# Patient Record
Sex: MALE | Race: WHITE | NOT HISPANIC OR LATINO | Employment: UNEMPLOYED | URBAN - METROPOLITAN AREA
[De-identification: names, ages, dates, MRNs, and addresses within clinical notes are randomized per-mention and may not be internally consistent; named-entity substitution may affect disease eponyms.]

---

## 2017-03-19 ENCOUNTER — HOSPITAL ENCOUNTER (EMERGENCY)
Facility: HOSPITAL | Age: 57
Discharge: HOME | End: 2017-03-19
Attending: EMERGENCY MEDICINE | Admitting: EMERGENCY MEDICINE

## 2017-03-19 VITALS
SYSTOLIC BLOOD PRESSURE: 118 MMHG | TEMPERATURE: 97.6 F | OXYGEN SATURATION: 98 % | RESPIRATION RATE: 20 BRPM | HEIGHT: 68 IN | HEART RATE: 63 BPM | DIASTOLIC BLOOD PRESSURE: 73 MMHG | BODY MASS INDEX: 21.98 KG/M2 | WEIGHT: 145 LBS

## 2017-03-19 DIAGNOSIS — M54.32 SCIATICA OF LEFT SIDE: Primary | ICD-10-CM

## 2017-03-19 PROCEDURE — 63710000001 PREDNISONE PER 5 MG: Performed by: EMERGENCY MEDICINE

## 2017-03-19 PROCEDURE — 99283 EMERGENCY DEPT VISIT LOW MDM: CPT

## 2017-03-19 PROCEDURE — 99284 EMERGENCY DEPT VISIT MOD MDM: CPT | Performed by: EMERGENCY MEDICINE

## 2017-03-19 RX ORDER — IBUPROFEN 200 MG
800 TABLET ORAL EVERY 6 HOURS PRN
COMMUNITY
End: 2017-07-28 | Stop reason: HOSPADM

## 2017-03-19 RX ORDER — HYDROCODONE BITARTRATE AND ACETAMINOPHEN 5; 325 MG/1; MG/1
1 TABLET ORAL EVERY 4 HOURS PRN
Qty: 20 TABLET | Refills: 0 | Status: SHIPPED | OUTPATIENT
Start: 2017-03-19 | End: 2017-07-28 | Stop reason: HOSPADM

## 2017-03-19 RX ORDER — CYCLOBENZAPRINE HCL 10 MG
10 TABLET ORAL 3 TIMES DAILY PRN
Qty: 20 TABLET | Refills: 0 | Status: SHIPPED | OUTPATIENT
Start: 2017-03-19 | End: 2017-07-28 | Stop reason: HOSPADM

## 2017-03-19 RX ORDER — PREDNISONE 20 MG/1
60 TABLET ORAL ONCE
Status: COMPLETED | OUTPATIENT
Start: 2017-03-19 | End: 2017-03-19

## 2017-03-19 RX ORDER — METHYLPREDNISOLONE 4 MG/1
TABLET ORAL
Qty: 21 TABLET | Refills: 0 | Status: SHIPPED | OUTPATIENT
Start: 2017-03-19 | End: 2017-07-28 | Stop reason: HOSPADM

## 2017-03-19 RX ADMIN — PREDNISONE 60 MG: 20 TABLET ORAL at 17:08

## 2017-03-19 NOTE — ED NOTES
Requested records from [facility].  Spoke with Luz Elena - [phone]     Keshawn Hunter  03/19/17 9730

## 2017-03-19 NOTE — ED PROVIDER NOTES
"Subjective   History of Present Illness  History of Present Illness    Chief complaint: back pain    Location: Lower back    Quality/Severity:  Moderate, sharp    Timing/Onset/Duration: Intermittent over the last month, but worse today    Modifying Factors: It hurts to move, it feels better after he took half of the hydrocodone that was an old prescription of his    Associated Symptoms: Patient denies any headache.  No fever chills or cough.  No sore throat earache or nasal congestion.  No chest pain or shortness breath.  No abdominal pain.  No diarrhea or burning when he urinates.  His been no loss of control of bladder or bowel function.  No urinary retention.    Narrative: This 56-year-old white male with a history of L4-L5 lumbar fusion in the past presents with intermittent low back pain for the last month.  One month ago he squatted and \"experiencing lower back pain shooting down his left leg.  She has no metal in his back.  There is no fever or chills.  There is no numbness, tingling, weakness, or change in bladder or bowel function.  Patient had an x-ray done at [facility] on Tuesday that was unremarkable according to the patient.  Today he was laying on the Skin and the pain returned and he could not get up off the couch.  His ex-wife came and gave him half of a hydrocodone pill that was an old prescription of his and he got enough relief come to the emergency department.  The patient has not seen his primary care provider since this flare started.    PCP:  Jaz Devlin      Review of Systems   Constitutional: Negative for chills and fever.   HENT: Negative for ear pain and sore throat.    Respiratory: Negative for cough and shortness of breath.    Cardiovascular: Negative for chest pain and leg swelling.   Gastrointestinal: Negative for abdominal pain, nausea and vomiting.   Genitourinary: Negative for decreased urine volume, difficulty urinating, dysuria, flank pain, frequency, hematuria and " urgency.   Musculoskeletal: Negative for arthralgias, back pain, neck pain and neck stiffness.   Skin: Negative for rash.   Neurological: Negative for dizziness, speech difficulty, weakness, light-headedness, numbness and headaches.   Hematological: Negative for adenopathy.   Psychiatric/Behavioral: Negative.  Negative for agitation and confusion.        Medication List      ASK your doctor about these medications          ibuprofen 200 MG tablet   Commonly known as:  ADVIL,MOTRIN           Past Medical History   Diagnosis Date   • Injury of back        No Known Allergies    Past Surgical History   Procedure Laterality Date   • Back surgery     • Brain surgery     • Kidney stone surgery         History reviewed. No pertinent family history.    Social History     Social History   • Marital status:      Spouse name: N/A   • Number of children: N/A   • Years of education: N/A     Social History Main Topics   • Smoking status: Current Every Day Smoker     Packs/day: 1.00   • Smokeless tobacco: None   • Alcohol use No   • Drug use: Defer   • Sexual activity: Defer     Other Topics Concern   • None     Social History Narrative   • None           Objective   Physical Exam   Constitutional: He is oriented to person, place, and time. He appears well-developed and well-nourished. No distress.   ED Triage Vitals:  Temp: 97.6 °F (36.4 °C) (03/19/17 1623)  Heart Rate: 63 (03/19/17 1623)  Resp: 20 (03/19/17 1623)  BP: 118/73 (03/19/17 1623)  SpO2: 98 % (03/19/17 1623)  Temp src: Oral (03/19/17 1623)  Heart Rate Source: n/a  Patient Position: Sitting (03/19/17 1623)  BP Location: Right arm (03/19/17 1623)  FiO2 (%): n/a    The patient's vitals were reviewed by me.  Unless otherwise noted they are within normal limits.     HENT:   Head: Normocephalic and atraumatic.   Right Ear: External ear normal.   Left Ear: External ear normal.   Nose: Nose normal.   Mouth/Throat: Oropharynx is clear and moist.   Eyes: Conjunctivae and  EOM are normal. Pupils are equal, round, and reactive to light. Right eye exhibits no discharge. Left eye exhibits no discharge. No scleral icterus.   Neck: Normal range of motion. Neck supple. No JVD present. No tracheal deviation present. No thyromegaly present.   Cardiovascular: Normal rate, regular rhythm, normal heart sounds and intact distal pulses.  Exam reveals no gallop and no friction rub.    No murmur heard.  Pulmonary/Chest: Effort normal and breath sounds normal. No stridor. No respiratory distress. He has no wheezes. He has no rales. He exhibits no tenderness.   Abdominal: Soft. Bowel sounds are normal. He exhibits no distension and no mass. There is no tenderness. There is no rebound and no guarding. No hernia.   Musculoskeletal: Normal range of motion. He exhibits no edema or deformity.   There is mild lower tenderness and spasm.  The patient has artery had half the hydrocodone just prior to arrival.     Lymphadenopathy:     He has no cervical adenopathy.   Neurological: He is alert and oriented to person, place, and time.   Skin: Skin is dry. No rash noted. He is not diaphoretic. No erythema. No pallor.   Psychiatric: His behavior is normal.   Nursing note and vitals reviewed.      Procedures         ED Course  ED Course      4:54 PM, 03/19/17:  The [facility] report was reviewed by me.  The report number is [id number].    5:11 PM, 03/19/17:  The lumbar spine x-ray report from [facility] which was performed on 13 March 2017 shows no acute findings.  There is a lumbar sacral junction level fusion noted at L4 level disc and facet degeneration.  This was read by Dr. Tommy Tovar MD.    7:18 PM, 03/19/17:  The process.  He has no new complaints.  He does feel better.  His vital signs were reviewed and are stable.  Neurological exam: Conscious alert oriented ×4 with no focal deficits noted.    7:19 PM, 03/19/17:  Patient's diagnosis of sciatica was discussed with him.  The plan will be to prescribe  the patient a Medrol Dosepak and Flexeril.  The patient is to follow-up with Dr. Jaz Devlin in 1 week.  He's been instructed to return if he has increasing pain, numbness, tingling, weakness, change in bladder or bowel function, fever, chills, worse in any way at all.  All the patient's questions were answered patient will be discharged in good condition.          MDM    Final diagnoses:   None         ED Medications:  Medications - No data to display    New Medications:     Medication List      ASK your doctor about these medications          ibuprofen 200 MG tablet   Commonly known as:  ADVIL,MOTRIN           Stopped Medications:     Medication List      ASK your doctor about these medications          ibuprofen 200 MG tablet   Commonly known as:  ADVIL,MOTRIN             Final diagnoses:   Sciatica of left side            Don Maddox MD  03/19/17 1923

## 2017-03-19 NOTE — DISCHARGE INSTRUCTIONS
Follow-up with Jaz mcleod in 1 week.  Return to emergency department if you have increasing pain, numbness, tingling, weakness, change in bladder or bowel function, worse in any way at all.

## 2017-06-20 ENCOUNTER — HOSPITAL ENCOUNTER (INPATIENT)
Facility: HOSPITAL | Age: 57
LOS: 1 days | Discharge: HOME | End: 2017-06-21
Attending: PHYSICAL MEDICINE & REHABILITATION | Admitting: PHYSICAL MEDICINE & REHABILITATION

## 2017-06-20 RX ORDER — CLOPIDOGREL BISULFATE 75 MG/1
75 TABLET ORAL DAILY
Status: DISCONTINUED | OUTPATIENT
Start: 2017-06-20 | End: 2017-06-21 | Stop reason: HOSPADM

## 2017-06-20 RX ORDER — ASPIRIN 325 MG
325 TABLET ORAL DAILY
Status: DISCONTINUED | OUTPATIENT
Start: 2017-06-20 | End: 2017-06-21 | Stop reason: HOSPADM

## 2017-06-20 RX ORDER — ONDANSETRON 4 MG/1
4 TABLET, FILM COATED ORAL EVERY 8 HOURS PRN
Status: DISCONTINUED | OUTPATIENT
Start: 2017-06-20 | End: 2017-06-21 | Stop reason: HOSPADM

## 2017-06-20 RX ORDER — HYDROCODONE BITARTRATE AND ACETAMINOPHEN 5; 325 MG/1; MG/1
1 TABLET ORAL EVERY 6 HOURS PRN
Status: DISCONTINUED | OUTPATIENT
Start: 2017-06-20 | End: 2017-06-21 | Stop reason: HOSPADM

## 2017-06-20 NOTE — PLAN OF CARE
Problem: Patient Care Overview (Adult)  Goal: Plan of Care Review  Outcome: Ongoing (interventions implemented as appropriate)    06/20/17 1846   Coping/Psychosocial Response Interventions   Plan Of Care Reviewed With patient   Patient Care Overview   Progress no change   Outcome Evaluation   Outcome Summary/Follow up Plan Admitted to rehab from [facility]. Pt. oriented to person only.       Goal: Adult Individualization and Mutuality  Outcome: Ongoing (interventions implemented as appropriate)    Problem: Fall Risk (Adult)  Goal: Identify Related Risk Factors and Signs and Symptoms  Outcome: Ongoing (interventions implemented as appropriate)  Goal: Absence of Falls  Outcome: Ongoing (interventions implemented as appropriate)    Problem: Nutrition, Enteral (Adult)  Goal: Signs and Symptoms of Listed Potential Problems Will be Absent or Manageable (Nutrition, Enteral)  Outcome: Ongoing (interventions implemented as appropriate)    Problem: Stroke (Hemorrhagic) (Adult)  Goal: Signs and Symptoms of Listed Potential Problems Will be Absent or Manageable (Stroke)  Outcome: Ongoing (interventions implemented as appropriate)

## 2017-06-21 ENCOUNTER — APPOINTMENT (OUTPATIENT)
Dept: CT IMAGING | Facility: HOSPITAL | Age: 57
End: 2017-06-21
Attending: HOSPITALIST

## 2017-06-21 ENCOUNTER — APPOINTMENT (OUTPATIENT)
Dept: NEUROLOGY | Facility: HOSPITAL | Age: 57
End: 2017-06-21
Attending: RADIOLOGY

## 2017-06-21 ENCOUNTER — APPOINTMENT (OUTPATIENT)
Dept: CT IMAGING | Facility: HOSPITAL | Age: 57
End: 2017-06-21
Attending: PHYSICAL MEDICINE & REHABILITATION

## 2017-06-21 ENCOUNTER — HOSPITAL ENCOUNTER (INPATIENT)
Facility: HOSPITAL | Age: 57
LOS: 5 days | End: 2017-06-26
Attending: HOSPITALIST | Admitting: HOSPITALIST

## 2017-06-21 VITALS
WEIGHT: 117 LBS | OXYGEN SATURATION: 98 % | SYSTOLIC BLOOD PRESSURE: 131 MMHG | DIASTOLIC BLOOD PRESSURE: 91 MMHG | RESPIRATION RATE: 16 BRPM | HEIGHT: 70 IN | TEMPERATURE: 98.1 F | BODY MASS INDEX: 16.75 KG/M2 | HEART RATE: 68 BPM

## 2017-06-21 DIAGNOSIS — R26.89 DECREASED MOBILITY: Primary | ICD-10-CM

## 2017-06-21 PROBLEM — I60.9 SUBARACHNOID HEMORRHAGE: Status: ACTIVE | Noted: 2017-06-21

## 2017-06-21 PROBLEM — R56.9 SEIZURE: Status: ACTIVE | Noted: 2017-06-21

## 2017-06-21 PROBLEM — R40.0 SOMNOLENCE: Status: ACTIVE | Noted: 2017-06-21

## 2017-06-21 PROBLEM — E44.0 PROTEIN-CALORIE MALNUTRITION, MODERATE: Status: ACTIVE | Noted: 2017-06-21

## 2017-06-21 PROBLEM — R13.12 OROPHARYNGEAL DYSPHAGIA: Status: ACTIVE | Noted: 2017-06-21

## 2017-06-21 PROBLEM — I60.9 SUBARACHNOID BLEED: Status: ACTIVE | Noted: 2017-06-21

## 2017-06-21 LAB
ALBUMIN SERPL-MCNC: 3.5 G/DL (ref 3.5–5.2)
ALBUMIN/GLOB SERPL: 1.2 G/DL
ALP SERPL-CCNC: 69 U/L (ref 39–117)
ALT SERPL W P-5'-P-CCNC: 32 U/L (ref 1–41)
ANION GAP SERPL CALCULATED.3IONS-SCNC: 9.6 MMOL/L
AST SERPL-CCNC: 24 U/L (ref 1–40)
BASOPHILS # BLD AUTO: 0.01 10*3/MM3 (ref 0–0.2)
BASOPHILS NFR BLD AUTO: 0.1 % (ref 0–1.5)
BILIRUB SERPL-MCNC: 0.5 MG/DL (ref 0.1–1.2)
BUN BLD-MCNC: 20 MG/DL (ref 6–20)
BUN/CREAT SERPL: 29.4 (ref 7–25)
CALCIUM SPEC-SCNC: 9.3 MG/DL (ref 8.6–10.5)
CHLORIDE SERPL-SCNC: 105 MMOL/L (ref 98–107)
CO2 SERPL-SCNC: 28.4 MMOL/L (ref 22–29)
CREAT BLD-MCNC: 0.68 MG/DL (ref 0.76–1.27)
DEPRECATED RDW RBC AUTO: 50.9 FL (ref 37–54)
EOSINOPHIL # BLD AUTO: 0.13 10*3/MM3 (ref 0–0.7)
EOSINOPHIL NFR BLD AUTO: 1.7 % (ref 0.3–6.2)
ERYTHROCYTE [DISTWIDTH] IN BLOOD BY AUTOMATED COUNT: 14.1 % (ref 11.5–14.5)
GFR SERPL CREATININE-BSD FRML MDRD: 120 ML/MIN/1.73
GLOBULIN UR ELPH-MCNC: 3 GM/DL
GLUCOSE BLD-MCNC: 128 MG/DL (ref 65–99)
GLUCOSE BLDC GLUCOMTR-MCNC: 125 MG/DL (ref 70–130)
HCT VFR BLD AUTO: 36.4 % (ref 40.4–52.2)
HGB BLD-MCNC: 12.3 G/DL (ref 13.7–17.6)
IMM GRANULOCYTES # BLD: 0.03 10*3/MM3 (ref 0–0.03)
IMM GRANULOCYTES NFR BLD: 0.4 % (ref 0–0.5)
LYMPHOCYTES # BLD AUTO: 0.9 10*3/MM3 (ref 0.9–4.8)
LYMPHOCYTES NFR BLD AUTO: 11.8 % (ref 19.6–45.3)
MCH RBC QN AUTO: 33.1 PG (ref 27–32.7)
MCHC RBC AUTO-ENTMCNC: 33.8 G/DL (ref 32.6–36.4)
MCV RBC AUTO: 97.8 FL (ref 79.8–96.2)
MONOCYTES # BLD AUTO: 0.53 10*3/MM3 (ref 0.2–1.2)
MONOCYTES NFR BLD AUTO: 7 % (ref 5–12)
NEUTROPHILS # BLD AUTO: 6.01 10*3/MM3 (ref 1.9–8.1)
NEUTROPHILS NFR BLD AUTO: 79 % (ref 42.7–76)
NRBC BLD MANUAL-RTO: 0 /100 WBC (ref 0–0)
PLATELET # BLD AUTO: 357 10*3/MM3 (ref 140–500)
PMV BLD AUTO: 10 FL (ref 6–12)
POTASSIUM BLD-SCNC: 3.6 MMOL/L (ref 3.5–5.2)
PROT SERPL-MCNC: 6.5 G/DL (ref 6–8.5)
RBC # BLD AUTO: 3.72 10*6/MM3 (ref 4.6–6)
SODIUM BLD-SCNC: 143 MMOL/L (ref 136–145)
WBC NRBC COR # BLD: 7.61 10*3/MM3 (ref 4.5–10.7)

## 2017-06-21 PROCEDURE — 85025 COMPLETE CBC W/AUTO DIFF WBC: CPT | Performed by: PHYSICAL MEDICINE & REHABILITATION

## 2017-06-21 PROCEDURE — 82962 GLUCOSE BLOOD TEST: CPT

## 2017-06-21 PROCEDURE — 51702 INSERT TEMP BLADDER CATH: CPT

## 2017-06-21 PROCEDURE — 99255 IP/OBS CONSLTJ NEW/EST HI 80: CPT | Performed by: RADIOLOGY

## 2017-06-21 PROCEDURE — 95816 EEG AWAKE AND DROWSY: CPT

## 2017-06-21 PROCEDURE — 70450 CT HEAD/BRAIN W/O DYE: CPT

## 2017-06-21 PROCEDURE — 95819 EEG AWAKE AND ASLEEP: CPT | Performed by: PSYCHIATRY & NEUROLOGY

## 2017-06-21 PROCEDURE — 80053 COMPREHEN METABOLIC PANEL: CPT | Performed by: PHYSICAL MEDICINE & REHABILITATION

## 2017-06-21 RX ORDER — ASPIRIN 325 MG
325 TABLET ORAL DAILY
Status: DISCONTINUED | OUTPATIENT
Start: 2017-06-22 | End: 2017-06-26 | Stop reason: HOSPADM

## 2017-06-21 RX ORDER — HYDROCODONE BITARTRATE AND ACETAMINOPHEN 5; 325 MG/1; MG/1
1 TABLET ORAL EVERY 6 HOURS PRN
Status: DISCONTINUED | OUTPATIENT
Start: 2017-06-21 | End: 2017-06-21

## 2017-06-21 RX ORDER — CLOPIDOGREL BISULFATE 75 MG/1
75 TABLET ORAL DAILY
Status: DISCONTINUED | OUTPATIENT
Start: 2017-06-22 | End: 2017-06-26 | Stop reason: HOSPADM

## 2017-06-21 RX ORDER — ONDANSETRON 2 MG/ML
4 INJECTION INTRAMUSCULAR; INTRAVENOUS EVERY 6 HOURS PRN
Status: DISCONTINUED | OUTPATIENT
Start: 2017-06-21 | End: 2017-06-26 | Stop reason: HOSPADM

## 2017-06-21 RX ORDER — CLOPIDOGREL BISULFATE 75 MG/1
75 TABLET ORAL DAILY
Status: CANCELLED | OUTPATIENT
Start: 2017-06-22

## 2017-06-21 RX ORDER — PHENYTOIN 125 MG/5ML
100 SUSPENSION ORAL 3 TIMES DAILY
Status: DISCONTINUED | OUTPATIENT
Start: 2017-06-21 | End: 2017-06-23

## 2017-06-21 RX ORDER — ONDANSETRON 4 MG/1
4 TABLET, FILM COATED ORAL EVERY 8 HOURS PRN
Status: CANCELLED | OUTPATIENT
Start: 2017-06-21

## 2017-06-21 RX ORDER — ONDANSETRON 4 MG/1
4 TABLET, FILM COATED ORAL EVERY 8 HOURS PRN
Status: DISCONTINUED | OUTPATIENT
Start: 2017-06-21 | End: 2017-06-21

## 2017-06-21 RX ORDER — CLOPIDOGREL BISULFATE 75 MG/1
75 TABLET ORAL DAILY
Status: DISCONTINUED | OUTPATIENT
Start: 2017-06-22 | End: 2017-06-21

## 2017-06-21 RX ORDER — ASPIRIN 325 MG
325 TABLET ORAL DAILY
Status: DISCONTINUED | OUTPATIENT
Start: 2017-06-22 | End: 2017-06-21

## 2017-06-21 RX ORDER — SODIUM CHLORIDE 0.9 % (FLUSH) 0.9 %
1-10 SYRINGE (ML) INJECTION AS NEEDED
Status: DISCONTINUED | OUTPATIENT
Start: 2017-06-21 | End: 2017-06-26 | Stop reason: HOSPADM

## 2017-06-21 RX ORDER — HYDROCODONE BITARTRATE AND ACETAMINOPHEN 5; 325 MG/1; MG/1
1 TABLET ORAL EVERY 6 HOURS PRN
Status: DISCONTINUED | OUTPATIENT
Start: 2017-06-21 | End: 2017-06-26 | Stop reason: HOSPADM

## 2017-06-21 RX ORDER — ASPIRIN 325 MG
325 TABLET ORAL DAILY
Status: CANCELLED | OUTPATIENT
Start: 2017-06-22

## 2017-06-21 RX ORDER — DEXTROSE AND SODIUM CHLORIDE 5; .45 G/100ML; G/100ML
50 INJECTION, SOLUTION INTRAVENOUS CONTINUOUS
Status: DISCONTINUED | OUTPATIENT
Start: 2017-06-21 | End: 2017-06-22

## 2017-06-21 RX ORDER — HYDROCODONE BITARTRATE AND ACETAMINOPHEN 5; 325 MG/1; MG/1
1 TABLET ORAL EVERY 6 HOURS PRN
Status: CANCELLED | OUTPATIENT
Start: 2017-06-21 | End: 2017-06-30

## 2017-06-21 RX ADMIN — DEXTROSE AND SODIUM CHLORIDE 50 ML/HR: 5; .45 INJECTION, SOLUTION INTRAVENOUS at 15:49

## 2017-06-21 RX ADMIN — PHENYTOIN 100 MG: 125 SUSPENSION ORAL at 22:09

## 2017-06-21 RX ADMIN — PHENYTOIN 100 MG: 125 SUSPENSION ORAL at 17:17

## 2017-06-21 NOTE — H&P
Patient Care Team:  Jaz Devlin DO as PCP - General (Family Medicine)    Chief complaint   1.  status post subarachnoid hemorrhage/intraventricular hemorrhage. Alba Mohamud grade 4  2.  Recurrent basilar aneurysm  3.  External ventricular drain  4.  June 4 cerebral angiogram with stent-assisted embolization - With the stent placement he should remain on aspirin 325 mg and Plavix 75 mg for 3 months.  After 3 months, he can return to aspirin 81 mg daily.  5.  Randi 15 ventricular peritoneal shunt Certas Plus Valve - setting of 4.   6.  June 17-new bleed right parietal lobe small amount at the entrance site of the ventricular peritoneal shunt catheter into the brain parenchyma  7.  June 19 PEG tube placement  8.  Failure to thrive.  9.  Urinary retention-coudé catheter removed on June 20 prior to transfer-on intermittent straight catheter regimen  10.  DVT prophylaxis-SCDs    Subjective     History of Present Illness  57-year-old male transferred from [facility] last evening.  He was admitted there on Randi 3.  History of basilar artery aneurysm rupture previously clipped greater than 20 years ago per the chart.  He was found down by his daughter at home.  Severely altered mental status and was unable to stay awake during questioning.  CT had revealed diffuse intraventricular hemorrhage and subsequent hydrocephalus with artifact from a clip reconstruction in the area of basal artery.  Upon arrival at [facility] CTA of the head was done and concerning for a basilar artery aneurysm recurrence.    He was taken to the operating room for placement of external ventricular drain for hydrocephalus and angiogram with stent-assisted embolization of a recurrent basis or tip aneurysm.  He was also found to have a non-ruptured right MCA aneurysm.  His hospital course was complicated by hydrocephalus, altered mental status, and failure to thrive requiring feeding tube placement.  The patient felt  ventricular drain clamping and required placement of ventriculoperitoneal shunt.    He remained very confused but was able to am to answer simple questions and follow simple commands.      On June 17 he had a change in his neurologic status and stat CT of the head showed a new bleed in the right parietal lobe,small amount at the entrance site of the ventricular peritoneal shunt catheter into the brain parenchyma   .      With the stent placement he should remain on aspirin 325 mg and Plavix 75 mg for 3 months.  After 3 months, he can return to aspirin 81 mg daily.      Other issues included urinary retention with the placement of a daily catheter at [facility] and then replaced on Randi 10 at [facility] when he was unable to void.  The Shanks catheter was removed prior to his transfer from [facility] last evening.    When the patient arrived last evening he was oriented to person only.  Around 4 AM he could did indicate to staff that he did not want any suctioning.  This morning, however, he is obtunded.  He will open his eyes to painful stimuli and grimace to have a stick and make an utterance but will not interact with the examiner.  Will not follow any commands.  It was not possible to assess any volitional movement given his obtunded state.  Rapid response has been called and the patient's being transferred to CT for stat CAT scan.  His imaging from [place] is available here on a disc.    Functionally, on June 20 bed mobility moderate assist to.  Sitting contact guard on edge of bed for balance.  Difficulty aligning chronic and head.  On June 19 upper body dressing minimum-moderate assist.  Lower body dressing max assist.  Poor balance.  On purée diet with thin liquids.  Videofluoroscopic swallow study on June 7 showed mild to moderate oral stage dysphagia.  Also supplemental tube feeds.  Severe cognitive linguistic deficits, mild to moderate expressive deficits, mild receptive  deficits on June 17.  Shanks catheter was removed on June 20 with intermittent straight catheter regimen.  Review of Systems   Not able to obtain.   Past Medical History:   Diagnosis Date   • Injury of back    • Kidney stones    • Stroke      Past Surgical History:   Procedure Laterality Date   • BACK SURGERY     • BRAIN SURGERY     • KIDNEY STONE SURGERY     •  SHUNT INSERTION     Basilar artery aneurysm clipping in the remote past.  PEG tube placement on June 19.  Cerebral angiogram gram and stent-assisted embolization of basilar artery aneurysm June 4, 2017.  Ventriculoperitoneal shunt placement Randi 15.  History reviewed. No pertinent family history.  Social History   Substance Use Topics   • Smoking status: Current Every Day Smoker     Packs/day: 2.00   • Smokeless tobacco: None   • Alcohol use No   Lives independently in a house with 2 steps to enter.  Was working 8-10 hours daily.   since 1991 but his ex-wife still talks with him daily.  He recently obtained for patient guardianship.  Plan is to go to the home of his ex-wife until his daughter returns to [place].  Occasional tobacco.  Marijuana use.  Smoked 2 packs per day.  Prescriptions Prior to Admission   Medication Sig Dispense Refill Last Dose   • cyclobenzaprine (FLEXERIL) 10 MG tablet Take 1 tablet by mouth 3 (Three) Times a Day As Needed for Muscle Spasms. 20 tablet 0    • HYDROcodone-acetaminophen (NORCO) 5-325 MG per tablet Take 1 tablet by mouth Every 4 (Four) Hours As Needed for Moderate Pain (4-6). 20 tablet 0    • ibuprofen (ADVIL,MOTRIN) 200 MG tablet Take 800 mg by mouth Every 6 (Six) Hours As Needed for Mild Pain (1-3).   3/18/2017 at Unknown time   • MethylPREDNISolone (MEDROL) 4 MG tablet follow package directions 21 tablet 0      Allergies:  Review of patient's allergies indicates no known allergies.    aspirin 325 mg Oral Daily   clopidogrel 75 mg Oral Daily   HYDROcodone-acetaminophen  •  ondansetron      Current  Facility-Administered Medications:   •  aspirin tablet 325 mg, 325 mg, Oral, Daily, Callum Baker MD  •  clopidogrel (PLAVIX) tablet 75 mg, 75 mg, Oral, Daily, Callum Baker MD  •  HYDROcodone-acetaminophen (NORCO) 5-325 MG per tablet 1 tablet, 1 tablet, Oral, Q6H PRN, Callum Baker MD  •  ondansetron (ZOFRAN) tablet 4 mg, 4 mg, Oral, Q8H PRN, Callum Baker MD    Objective      Vital Signs  Temp:  [97.5 °F (36.4 °C)-98.5 °F (36.9 °C)] 98.1 °F (36.7 °C)  Heart Rate:  [66-98] 76  Resp:  [14-16] 16  BP: (125-147)/(83-92) 125/83    Physical Exam  Mental status-obtunded will not do his eyes except painful stimuli.  Does not follow any commands.  We will make an utterance when IV was placed  Lungs-clear to auscultation anteriorly  Heart-regular rate and rhythm  Abdomen-PEG tube site unremarkable.  Positive bowel sounds.  Soft.  Extremities-no edema  Neurologic-handed.  Does not open his eyes except to painful stimuli.  Does not follow any commands.  Does make an utterance when an IV was placed.  Does not do any volitional movement.  Did have discomfort with passive range of motion of his legs.  Results Review:    On June 16 sodium 140, potassium 3.9, chloride 109, carbon dioxide 24, dealing 20, creatinine 0.6, magnesium 2.3, calcium 8.3.  On Rnadi 3 AST 51, a LT 49, alkaline phosphatase 53.  On June 4 triglycerides 55, cholesterol 132, HDL 46, LDL 75.  On June 1630 BC 9.2 hemoglobin 0.5 hematocrit 34.8, platelets 320.   I reviewed the patient's new clinical results.      Assessment/Plan     Active Problems:    Subarachnoid bleed      Assessment & Plan  1.  status post subarachnoid hemorrhage/intraventricular hemorrhage. Alba Mohamud grade 4  2.  Recurrent basilar aneurysm  3.  External ventricular drain  4.  June 4 cerebral angiogram with stent-assisted embolization - With the stent placement he should remain on aspirin 325 mg and Plavix 75 mg for 3 months.  After 3 months, he can return to  aspirin 81 mg daily.  5.  Randi 15 ventricular peritoneal shunt Certas Plus Valve - setting of 4.   6.  June 17-new bleed right parietal lobe small amount at the entrance site of the ventricular peritoneal shunt catheter into the brain parenchyma  7.  June 19 PEG tube placement  8.  Failure to thrive.  9.  Urinary retention-coudé catheter removed on June 20 prior to transfer-on intermittent straight catheter regimen  10.  DVT prophylaxis-SCDs    Decline in neurologic status on the a.m. of June 21.  Obtunded.  Stat CT of the brain ordered, stroke neurology notified to evaluate.  Patient being transferred to the acute care wing of the hospital.  Neurology consult   for evaluation.  Given his neurologic change, he is not presently appropriate for transfer back to [facility] at this time.  The differential would include recurrent bleed versus hydrocephalus versus seizure.  We'll check CBC and CMP for any metabolic change.  I discussed the patients findings and my recommendations with nursing staff    Callum Baker MD  06/21/17  8:27 AM    Time:

## 2017-06-21 NOTE — CONSULTS
"Adult Nutrition  Assessment/PES    Patient Name:  Rafita Davis  YOB: 1960  MRN: [id number]  Admit Date:  6/21/2017    Assessment Date:  6/21/2017     Consult received- ordered TF-Jevity 1.2 goal rate @ 60 cc/hr, fluid flushes 15 cc q hour; RD to monitor and follow         Reason for Assessment       06/21/17 1356    Reason for Assessment    Reason For Assessment/Visit nurse/nurse practitioner consult;identified at risk by screening criteria;TF/PN    Diagnosis --   bleeding in brain                Anthropometrics       06/21/17 1357    Anthropometrics (Special Considerations)    Height Used for Calculations 1.778 m (5' 10\")    Weight Used for Calculations 53.1 kg (117 lb)    RD Calculated IBW 73    RD Calculated % IBW 72    RD Calculated BMI (kg/m2) 16.7    Body Mass Index (BMI)    BMI Grade 16 - 16.9 low grade II            Labs/Tests/Procedures/Meds       06/21/17 1358    Labs/Tests/Procedures/Meds    Diagnostic Test/Procedure Review reviewed    Labs/Tests Review Reviewed;Glucose    Medication Review Reviewed, pertinent   aspirin, D5% with NaCl    Significant Vitals reviewed            Physical Findings       06/21/17 1358    Physical Findings/Assessment    Additional Documentation Physical Appearance (Group)   B=15    Physical Appearance    Overall Physical Appearance underweight    Tubes peg tube            Estimated/Assessed Needs       06/21/17 1358    Calculation Measurements    Weight Used For Calculations 53.1 kg (117 lb)    Height Used for Calculations 1.778 m (5' 10\")    Estimated/Assessed Energy Needs    Energy Need Method Kcal/kg    kcal/kg 30    30 Kcal/Kg (kcal) 1592.13    Estimated/Assessed Protein Needs    Weight Used for Protein Calculation 53.1 kg (117 lb)    Protein (gm/kg) 1.0    1.0 Gm Protein (gm) 53.07    Estimated/Assessed Fluid Needs    Fluid Need Method RDA method    RDA Method (mL)  1600            Nutrition Prescription Ordered       06/21/17 1359    Nutrition " Prescription PO    Current PO Diet NPO                Problem/Interventions:        Problem 1       06/21/17 1359    Nutrition Diagnoses Problem 1    Problem 1 Needs Alternate Route    Etiology (related to) MNT for Treatment/Condition    Signs/Symptoms (evidenced by) NPO                    Intervention Goal       06/21/17 1359    Intervention Goal    General Maintain nutrition;Nutrition support treatment    TF/PN Inititiate TF/PN;Tolerate TF at goal    Weight Appropriate weight gain            Nutrition Intervention       06/21/17 1359    Nutrition Intervention    RD/Tech Action Follow Tx progress;Care plan reviewd            Nutrition Prescription       06/21/17 1359    Nutrition Prescription EN    Enteral Prescription Enteral begin/change    Enteral Route PEG    Product Jevity 1.2 cheyenne    TF Delivery Method Continuous    Continuous TF Goal Rate (mL/hr) 60 mL/hr    Water flush (mL)  15 mL    Water Flush Frequency Per hour    New EN Prescription Ordered? Yes            Education/Evaluation       06/21/17 3119    Education    Education Will Instruct as appropriate    Monitor/Evaluation    Monitor Per protocol    Education Follow-up Reinforce PRN          Electronically signed by:  Tara Milian RD  06/21/17 2:00 PM

## 2017-06-21 NOTE — PROGRESS NOTES
Inpatient Rehabilitation Plan of Care Note    Plan of Care  Care Plan Reviewed - No updates at this time.    Safety    Performed Intervention(s)  Safety Rounds      Psychosocial    Performed Intervention(s)  Encourage to verbalize concerns and needs.    Signed by: Benjamin Horowitz RN

## 2017-06-21 NOTE — NURSING NOTE
Team D. Norm. Almost obtunded    0815. Team D here. To transfer to [address] [place] [address].  Called Nisreen Davis his ex wife.  Full code status for now.  Explained different code levels.  She is an hour away and is on way.  He takes no home meds.  She knows to go to [facility].  I will call report.

## 2017-06-21 NOTE — PROGRESS NOTES
Inpatient Rehabilitation Plan of Care Note    Plan of Care  Care Plan Reviewed - No updates at this time.    Psychosocial    [RN] Behavior(Active)  Current Status(06/19/2017): Pt. is confused. Lacks insight with current  situation.  Weekly Goal(06/26/2017): Pt. will be able to express concerns.  Discharge Goal: Same as weekly        Safety    [RN] Potential for Injury(Active)  Current Status(06/19/2017): Hx. of fall  Weekly Goal(06/26/2017): No falls  Discharge Goal: No falls        Sphincter Control    [RN] Bladder Management(Active)  Current Status(06/19/2017): Shanks removed 6/20 prior to admisison. Patient  incontinent.  Weekly Goal(06/26/2017): Continent 100%  Discharge Goal: Continent 100%    [RN] Bowel Management(Active)  Current Status(06/19/2017): Incontinent  Weekly Goal(06/26/2017): Continent 100%  Discharge Goal: Continent 100%    Signed by: Colten Roblero RN

## 2017-06-21 NOTE — PLAN OF CARE
Problem: Patient Care Overview (Adult)  Goal: Plan of Care Review  Outcome: Unable to achieve outcome(s) by discharge Date Met:  06/21/17 06/21/17 0952   Coping/Psychosocial Response Interventions   Plan Of Care Reviewed With patient   Patient Care Overview   Progress declining   Outcome Evaluation   Outcome Summary/Follow up Plan Tx after team d to 5 s with mental status change         Problem: Fall Risk (Adult)  Goal: Absence of Falls  Outcome: Ongoing (interventions implemented as appropriate)    06/21/17 0952   Fall Risk (Adult)   Absence of Falls unable to achieve outcome

## 2017-06-21 NOTE — PROGRESS NOTES
Malnutrition Severity Assessment    Patient Name:  Rafita Davis  YOB: 1960  MRN: [id number]  Admit Date:  6/21/2017    Patient meets criteria for : Moderate malnutrition        Malnutrition Type: Chronic Illness Malnutrition     Malnutrition Type (last 8 hours)      Malnutrition Severity Assessment       06/21/17 1401    Malnutrition Severity Assessment    Malnutrition Type Chronic Illness Malnutrition          Weight Status         Most Recent Value    BMI  Mod (<17)    %IBW  Mod <80% [72% IBW]      Energy Intake Status         Most Recent Value    Energy Intake  Mod (<75% / > or equal to 1 mo)              Electronically signed by:  Tara Milian RD  06/21/17 2:01 PM

## 2017-06-21 NOTE — PROGRESS NOTES
SECTION GG    Eating Performance Discharge: [place] does all of the effort. Patient does none  of the effort to complete the activity. Or, the assistance of 2 or more helpers  is required for the patient to complete the activity.    Signed by: Benjamin Horowitz RN

## 2017-06-21 NOTE — PROGRESS NOTES
Inpatient Rehabilitation Functional Measures Assessment    Functional Measures  MATILDE Eating:  [facility] Grooming: [facility] Bathing:  [facility] Upper Body Dressing:  [facility] Lower Body Dressing:  [facility] Toileting:  [facility] Bladder Management  Level of Assistance:  [place]  Frequency/Number of Accidents this Shift:  [facility] Bowel Management  Level of Assistance: [place]  Frequency/Number of Accidents this Shift: [facility] Bed/Chair/Wheelchair Transfer:  [facility] Toilet Transfer:  [facility] Tub/Shower Transfer:  [place]    Previously Documented Mode of Locomotion at Discharge: Field  MATILDE Expected Mode of Locomotion at Discharge: [facility] Walk/Wheelchair:  [facility] Stairs:  [facility] Comprehension:  Auditory comprehension is the usual mode. Patient does not  comprehend complex/abstract information in their primary language without  assistance from a helper. Comprehension Score = 2, Maximal Prompting. Patient  comprehends basic daily needs 25-49% of the time. Patient understands simple  information via single words or gestures. Requires maximal/a lot of prompting  (most of the time). No assistive devices were required.  MATILDE Expression:  Vocal expression is the usual mode. Patient does not express  complex/abstract information in their primary language without a helper.  Expression Score = 2, Maximal Prompting. Patient expresses basic daily needs  25-49% of the time. Patient uses only single words or gestures and requires  maximal/a lot of prompting (most of the time). No assistive devices were  required.  MATILDE Social Interaction:  Social Interaction Score = 3, Moderate Direction.  Patient interacts appropriately 50-74% of the time.  Patient requires  moderate/some direction for the following behavior(s):  MATILDE Problem Solving:  Patient does not make appropriate decisions in order to  solve complex problems without assistance from a helper. Problem Solving Score =  3, Moderate  Direction. Patient makes appropriate decisions in order to solve  routine problems 50-74% of the time. Patient requires moderate/some direction  for the following behavior(s):  MATILDE Memory:  Memory Score = 3, Moderate Prompting. Patient recognizes and  remembers 50-74% of the time. Patient requires moderate/some prompting  for  memory for the following:    Therapy Mode Minutes  Occupational Therapy: Branch  Physical Therapy: Branch  Speech Language Pathology:  Branch    Signed by: Colten Roblero RN

## 2017-06-21 NOTE — CODE DOCUMENTATION
"Mews score of 3. Pt is admitted to rehab from [facility] last night (6/20). Most information about patient was obtained via chart review. Pt was described as more alert during night shift. Day shift nurse states pt is more difficult to arouse. With constant loud stimulation, pt is able to open eyes and say \"shit\" or \"oh shit\". No other commands or responses from patient. Pt is unable to cooperate with NIH exam. Discussed with Dr Michael Hoover and Dr Baker. All agree on CT scan. New IV placed. Labs drawn. Pt transported to ct department. After scan completed, Dr gore and Dr Michael Hoover discussed. Admitting patient to tele floor for further evaluation. Pt taken to 5sout and report given. Pt is able to wake up now with less stimuli. Able to maintain wakefulness for longer than prior exam. Pt was able to hold himself over in the bed, squeeze my hand with the right side and follow other simple commands. Not much verbalization was observed. Pt states \"yeah\", \"ok\", \"alright now\" and other simple phrases. Pt remains lethargic. Ex- wife was notified of patient transfer and is in route to hospital. New orders placed for eeg and TCD.   "

## 2017-06-21 NOTE — CONSULTS
DOS: 2017  NAME: Rafita Davis   : 1960  PCP: Jaz Devlin DO  CC: Unresponsive     Referring MD: Marleny Batista, *     I reviewed the records from [place]    Neurological Problem and Interval History:  57 y.o. RHW male with a Hx of smoking and cerebral aneurysm clipping 20y ago.  The patient was admitted on Randi 3 to 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  He had not been heard from for 2 days by his family and was found having convulsions and biting his tongue. Subsequently treated with stent assisted coiling.  He developed hydrocephalus and needed a  shunt.  This was complicated by hemorrhage along the tract.  He did develop vasospasm documented by CT angiography and he is reported to have a 5mm right MCA aneurysm.  The family states that he has been progressively declining for the past 2 weeks and has not been eating.  He just had a PEG placed 2-3 days ago and he has lost a great deal of weight.  They denies any stroke or TIA symptoms and they denies any seizure activity in the hospital.  He was last at his baseline at 4:30 AM when the morning nurse went in to evaluate him he was very sleepy difficult to arouse and not answering questions is briskly.  She thought he might of had some left-sided weakness as well.  A rapid response was called and I ordered a stat CT scan of the brain.  Since then he has slowly improved and per his ex-wife who is medical decision maker he is back to the baseline.  This consists of significant memory trouble and confusion and episodes of going in and out.  He was smoking 2 packs per day.    Past Medical/Surgical Hx:  Past Medical History:   Diagnosis Date   • Injury of back    • Kidney stones    • Stroke      Past Surgical History:   Procedure Laterality Date   • BACK SURGERY     • BRAIN SURGERY     • KIDNEY STONE SURGERY     •  SHUNT INSERTION         Review of Systems:        A complete review of  all systems is negative except as described above.    Medications On Admission  Prescriptions Prior to Admission   Medication Sig Dispense Refill Last Dose   • cyclobenzaprine (FLEXERIL) 10 MG tablet Take 1 tablet by mouth 3 (Three) Times a Day As Needed for Muscle Spasms. 20 tablet 0    • HYDROcodone-acetaminophen (NORCO) 5-325 MG per tablet Take 1 tablet by mouth Every 4 (Four) Hours As Needed for Moderate Pain (4-6). 20 tablet 0    • ibuprofen (ADVIL,MOTRIN) 200 MG tablet Take 800 mg by mouth Every 6 (Six) Hours As Needed for Mild Pain (1-3).   3/18/2017 at Unknown time   • MethylPREDNISolone (MEDROL) 4 MG tablet follow package directions 21 tablet 0        Allergies:  No Known Allergies    Social Hx:  Social History     Social History   • Marital status:      Spouse name: N/A   • Number of children: N/A   • Years of education: N/A     Occupational History   • Not on file.     Social History Main Topics   • Smoking status: Current Every Day Smoker     Packs/day: 2.00   • Smokeless tobacco: Not on file   • Alcohol use No   • Drug use: Yes     Special: Marijuana   • Sexual activity: Defer     Other Topics Concern   • Not on file     Social History Narrative       Family Hx:  No family history on file.    Review of Imaging (Interpretation of images not reports):  CT brain: There is evidence of a right occipital EVD catheter with hemorrhage around the track, there is significant beam hardening artifact from a coil mass in the region of the basilar apex as well as a stent in the basilar artery,    Laboratory Results:   Lab Results   Component Value Date    GLUCOSE 128 (H) 06/21/2017    CALCIUM 9.3 06/21/2017     06/21/2017    K 3.6 06/21/2017    CO2 28.4 06/21/2017     06/21/2017    BUN 20 06/21/2017    CREATININE 0.68 (L) 06/21/2017    EGFRIFNONA 120 06/21/2017    BCR 29.4 (H) 06/21/2017    ANIONGAP 9.6 06/21/2017     Lab Results   Component Value Date    WBC 7.61 06/21/2017    HGB 12.3 (L)  06/21/2017    HCT 36.4 (L) 06/21/2017    MCV 97.8 (H) 06/21/2017     06/21/2017   P2Y12- 204 (6/15/17), ARU- 414    Physical Examination:  There were no vitals taken for this visit.  General Appearance:   Well developed, Thin bordering on cachectic, well groomed, alert, and cooperative.  HEENT: Post EVD and  shunt scars and hardware.  Normal fundoscopic exam including normal retina, discs are flat with sharp margins, normal vasculature.  Neck and Spine: Normal range of motion.  Normal alignment. No mass or tenderness. No bruits.  Cardiac: Regular rate and rhythm. No murmurs.  Peripheral Vasculature: Radial and pedal pulses are equal 1+ and symmetric. No signs of distal embolization.  Extremities:    No edema or deformities. Normal joint ROM. DEE hoses and SCD's in place  Skin:    No rashes or birth marks.    Neurological examination:  Higher Integrative  Function: Sleepy but arouses with frequent stimulation, he is very inattentive.  Oriented to self.  He was able to name his ex-wife but not his brother.  He couldn't register 3 out of 3 objects but recall 0 out of 3 at 5 minutes.  Language is normal.  There is no obvious neglect.  Higher integrative function is poor.  Fund of knowledge is poor.  CN II: Pupils are slightly unequal with the right slightly larger, round, and reactive to light. Normal visual fields to confrontation.    CN III IV VI: Extraocular movements are full without nystagmus.   CN V: Normal facial sensation and strength of muscles of mastication.  CN VII: Facial movements are symmetric. No weakness.  CN VIII:   Auditory acuity is normal.  CN IX & X:   Symmetric palatal movement.  CN XI: Sternocleidomastoid and trapezius are normal.  No weakness.  CN XII:   The tongue is midline.  No atrophy or fasciculations.  Motor: Probably normal muscle strength although he gave poor effort, decreased bulk and normal tone in upper and lower extremities.  No fasciculations, rigidity, spasticity, or  abnormal movements.  Reflexes: 2+ in the upper and lower extremities. Plantar responses are flexor on the right and extensor on the left.  Sensation: Normal to light touch in arms and legs, he was not attentive enough to test other modalities reliably.  Station and Gait: Unable to get up or walk.    Coordination: Finger to nose test shows no dysmetria.  Rapid alternating movements are normal.  Heel to shin he would not perform    Diagnoses / Discussion:  57 y.o. who presents with Sx of decreased responsiveness now slowly improving in the setting of subarachnoid hemorrhage, vasospasm and hydrocephalus.  He is back to his baseline which consists of severe amnesia cognitive deficits which is entirely consistent with a recent subarachnoid hemorrhage.  Given that he presented with a seizure I suspect he had a seizure this morning and he was postictal.  Therefore an EEG is appropriate.  I do not see a need for urgent vascular imaging since given the artifact from metal the only way to make sure everything is open would be with a cerebral angiogram.    Plan:  CT brain stat (done)  EEG stat  Aspirin 325mg  Plavix 75mg  Start antiepileptic- avoid Keppra because of its sedating and cognitive side effects- phenytoin 300 mg QD or Vimpat 100 mg twice a day  Hydrate  Neurochecks  Non-pharmacological DVT prophylaxis  EKG Tele  PT/OT/ST  Stroke Education  Blood pressure control to <130/80  Goal LDL <70-recommend high dose statins-   Serum glucose < 140     Call 911 for stroke any stroke symptoms    I have discussed the above with the Dr Harding, patient and family.  Time spent with patient: 60min    MDM  Reviewed: previous chart and vitals  Interpretation: CT scan and labs      Dictated via dragon dictation

## 2017-06-21 NOTE — PLAN OF CARE
Problem: Nutrition, Enteral (Adult)  Goal: Signs and Symptoms of Listed Potential Problems Will be Absent or Manageable (Nutrition, Enteral)  Outcome: Ongoing (interventions implemented as appropriate)    Problem: Stroke (Hemorrhagic) (Adult)  Goal: Signs and Symptoms of Listed Potential Problems Will be Absent or Manageable (Stroke)  Outcome: Ongoing (interventions implemented as appropriate)    Problem: Fall Risk (Adult)  Goal: Identify Related Risk Factors and Signs and Symptoms  Outcome: Outcome(s) achieved Date Met:  06/21/17  Goal: Absence of Falls  Outcome: Ongoing (interventions implemented as appropriate)    Problem: Urine Elimination, Impaired (Adult)  Goal: Identify Related Risk Factors and Signs and Symptoms  Outcome: Outcome(s) achieved Date Met:  06/21/17  Goal: Effective Urinary Elimination  Outcome: Ongoing (interventions implemented as appropriate)  Goal: Effective Containment of Urine  Outcome: Ongoing (interventions implemented as appropriate)  Goal: Reduced Incontinence Episodes  Outcome: Ongoing (interventions implemented as appropriate)

## 2017-06-21 NOTE — PLAN OF CARE
Problem: Patient Care Overview (Adult)  Goal: Plan of Care Review  Outcome: Ongoing (interventions implemented as appropriate)    06/20/17 2150   Coping/Psychosocial Response Interventions   Plan Of Care Reviewed With patient   Patient Care Overview   Progress no change   Outcome Evaluation   Outcome Summary/Follow up Plan Pt. new admit earlier this afternoon. Confused; oriented to self only. No complaints. tolerating TF.        Goal: Adult Individualization and Mutuality  Outcome: Ongoing (interventions implemented as appropriate)  Goal: Discharge Needs Assessment  Outcome: Ongoing (interventions implemented as appropriate)    Problem: Fall Risk (Adult)  Goal: Identify Related Risk Factors and Signs and Symptoms  Outcome: Outcome(s) achieved Date Met:  06/20/17  Goal: Absence of Falls  Outcome: Ongoing (interventions implemented as appropriate)    Problem: Nutrition, Enteral (Adult)  Goal: Signs and Symptoms of Listed Potential Problems Will be Absent or Manageable (Nutrition, Enteral)  Outcome: Ongoing (interventions implemented as appropriate)    Problem: Stroke (Hemorrhagic) (Adult)  Goal: Signs and Symptoms of Listed Potential Problems Will be Absent or Manageable (Stroke)  Outcome: Ongoing (interventions implemented as appropriate)

## 2017-06-21 NOTE — PROGRESS NOTES
Inpatient Rehabilitation Functional Measures Assessment    Functional Measures  MATILDE Eating:  [facility] Grooming: [facility] Bathing:  [facility] Upper Body Dressing:  [facility] Lower Body Dressing:  [facility] Toileting:  [facility] Bladder Management  Level of Assistance:  [place]  Frequency/Number of Accidents this Shift:  [facility] Bowel Management  Level of Assistance: [place]  Frequency/Number of Accidents this Shift: [facility] Bed/Chair/Wheelchair Transfer:  [facility] Toilet Transfer:  [facility] Tub/Shower Transfer:  [place]    Previously Documented Mode of Locomotion at Discharge: Field  MATILDE Expected Mode of Locomotion at Discharge: [facility] Walk/Wheelchair:  [facility] Stairs:  [facility] Comprehension:  Auditory comprehension is the usual mode. Patient does not  comprehend complex/abstract information in their primary language without  assistance from a helper. Comprehension Score = 1, Total Assistance.  Patient  understands basic daily needs less than 25% of the time and/or does not  understand simple information such as single words or gestures. Patient requires  the following assistive device(s): obtunded .  MATLIDE Expression:  Vocal expression is the usual mode. Patient does not express  complex/abstract information in their primary language without a helper.  Expression Score = 1, Total Assistance. Patient expresses basic daily needs less  than 25% of the time, or does not express basic needs appropriately or  consistently despite prompting. Patient requires the following assistive  device(s): obtunded .  MATILDE Social Interaction:  Social Interaction Score = 1, Total Assistance. Patient  interacts appropriately less than 25% of the time.  Patient requires total  assistance (directing all or almost all of the time) for the following  behavior(s): obtunded .  MATILDE Problem Solving:  Patient does not make appropriate decisions in order to  solve complex problems without assistance from  a helper. Problem Solving Score =  1, Total Assistance. Patient makes appropriate decisions in order to solve  routine problems less than 25% of the time. Patient requires total direction for  the following behavior(s): obtunded .  MATILDE Memory:  Memory Score = 1, Total Assistance. Patient recognizes and  remembers less than 25% of the time. Patient requires total assistance  (prompting all or almost all of the time) for memory for the following: obtunded  . obtunded .    Therapy Mode Minutes  Occupational Therapy: Branch  Physical Therapy: Branch  Speech Language Pathology:  Branch    Signed by: Benjamin Horowitz RN

## 2017-06-21 NOTE — H&P
[facility]    Patient Identification:  Name: Rafita Davis  Age/Sex: 57 y.o. male  :  1960  MRN: [id number]         Primary Care Physician: Jaz Devlin DO    No chief complaint on file.  decreased responsiveness      History of Present Illness  Patient is a 57 y.o. male who presents with an episode of unresponsiveness this a.m. while in acute rehabilitation.  The patient was transferred from [facility] yesterday after an intervention for a subarachnoid and intraventricular hemorrhage with placement of a  shunt with a recurrent bleed in the right parietal lobe near the  shunt.  The patient is minimally responsive and not able to provide any history.  The family is at bedside and providing some history.  While in the hospital he was not able to eat adequately and had a PEG tube placed for failure to thrive.  They indicate that he lost approximately 35 pounds while in the hospital.  He had urinary retention post catheter removal on 17. They were unaware of whether he had any urinary tract infections while there.  The patient had previous history of kidney stones but no known BPH or recurrent urinary tract infections.  He had a cerebral aneurysm treated about 20 years ago without any residual symptoms.  Since these events have all occurred he's opened his eyes occasionally and talked to family but has not had any meaningful conversation.  There is chronic tobacco use of about a pack a day for numerous years without any documented COPD, asthma, recurrent bronchitis.  They're unaware of any history of cardiac or gastrointestinal disease.  Prior to the neurological event he was not on any medications.    Past Medical History:   Diagnosis Date   • Injury of back    • Kidney stones    • Stroke      Past Surgical History:   Procedure Laterality Date   • BACK SURGERY     • BRAIN SURGERY     • KIDNEY STONE SURGERY     •  SHUNT INSERTION        No family history on file.  Social History   Substance Use Topics   • Smoking status: Current Every Day Smoker     Packs/day: 2.00   • Smokeless tobacco: Not on file   • Alcohol use No     Prescriptions Prior to Admission   Medication Sig Dispense Refill Last Dose   • cyclobenzaprine (FLEXERIL) 10 MG tablet Take 1 tablet by mouth 3 (Three) Times a Day As Needed for Muscle Spasms. 20 tablet 0    • HYDROcodone-acetaminophen (NORCO) 5-325 MG per tablet Take 1 tablet by mouth Every 4 (Four) Hours As Needed for Moderate Pain (4-6). 20 tablet 0    • ibuprofen (ADVIL,MOTRIN) 200 MG tablet Take 800 mg by mouth Every 6 (Six) Hours As Needed for Mild Pain (1-3).   3/18/2017 at Unknown time   • MethylPREDNISolone (MEDROL) 4 MG tablet follow package directions 21 tablet 0      Allergies:  Review of patient's allergies indicates no known allergies.    Review Of Systems:  Unobtainable      Vital Signs  Temp:  [97.5 °F (36.4 °C)-98.5 °F (36.9 °C)] 98.1 °F (36.7 °C)  Heart Rate:  [66-98] 68  Resp:  [14-16] 16  BP: (125-147)/(83-92) 131/91  There is no height or weight on file to calculate BMI.  GENERAL: The patient is a  white male who appears his stated age.   HEENT: PERRLA, pupils are 4 mm in size, patient will not open his mouth for me to look at oral mucosa   NECK: Thyroid is not enlarged, no lymphadenopathy or sternocleidomastoid hypertrophy  CHEST: Normal shape and expansion, no retractions or tenderness  LUNGS: Clear, decreased breath sounds bilaterally  HEART:Regular rate and rhythm, no murmur  ABDOMEN: Soft, nondistended, and mild tenderness in the right upper quadrant where there is a surgical incision according to family.  I was unable to remove the abdominal binder currently   EXTREMITIES: No edema or cyanosis, some spontaneous movement of his legs and arms with obvious muscle wasting  NEUROLOGIC: He responded to his name and stated a few words but not clear.  He did follow commands for me to assess muscle strength  which appears to be normal both in upper and lower extremities, no clonus, plantar response bilaterally is equivocal   RECTAL/SCROTAL: Not performed        .  Results Review:    I reviewed the patient's new clinical results.      Results from last 7 days  Lab Units 06/21/17  0830   WBC 10*3/mm3 7.61   HEMOGLOBIN g/dL 12.3*   PLATELETS 10*3/mm3 357       Results from last 7 days  Lab Units 06/21/17  0830   SODIUM mmol/L 143   POTASSIUM mmol/L 3.6   CHLORIDE mmol/L 105   TOTAL CO2 mmol/L 28.4   BUN mg/dL 20   CREATININE mg/dL 0.68*   CALCIUM mg/dL 9.3   GLUCOSE mg/dL 128*         Hemoglobin A1C:No results found for: HGBA1C    Glucose Range:  Glucose   Date/Time Value Ref Range Status   06/21/2017 0806 125 70 - 130 mg/dL Final       Assessment/Plan     Principal Problem:    Seizure  Active Problems:    Subarachnoid hemorrhage    Somnolence    Oropharyngeal dysphagia    Protein-calorie malnutrition, moderate      Assessment & Plan  Seizure: This morning's somnolence is likely related to seizure.  CT scan of brain did not show any evidence of repeat bleeding.  Vimpat to be started.    Oral pharyngeal dysphagia: Continue with PEG tube with Jevity starting at 40 cc an hour and increasing up to 60.  Dietary to see, IV fluids today  Speech therapy to evaluate for any oral intake    PT and OT to evaluate: Will eventually go back to rehabilitation likely in the next 24-48 hours to pain upon status    Protein calorie malnutrition: Extra protein to be added to the current nutrition    Anemia: Unspecified with high MCV check B12 folate and iron studies and vitamin D    Discussed CODE STATUS with his family with plans for him to be a full code currently his current status would not be acceptable to him or them long-term    I discussed the patients findings and my recommendations with patient.          Marleny Batista MD  [facility]  06/21/17  2:27 PM

## 2017-06-21 NOTE — SIGNIFICANT NOTE
06/21/17 1328   Rehab Treatment   Discipline speech language pathologist   Rehab Evaluation   Evaluation Not Performed unable to evaluate, medical status change  (Pt obtunded this AM, improving this PM per RN. Pt was on puree and thins at NH, but also with PEG. After discussing patient care with RN, SLP and RN agree bedside swallow is more appropriate next date d/t patient's cognitive status. RN to contact SLP if current status changes.)   Recommendations   SLP - Next Appointment (PRN)

## 2017-06-21 NOTE — PLAN OF CARE
Problem: Patient Care Overview (Adult)  Goal: Plan of Care Review  Outcome: Ongoing (interventions implemented as appropriate)    06/21/17 6472   Coping/Psychosocial Response Interventions   Plan Of Care Reviewed With patient;family   Patient Care Overview   Progress improving   Outcome Evaluation   Outcome Summary/Follow up Plan Pt transferred from [facility] rehab today r/t lethargy and disoirentedx4, EEG performed, neuro consulted. Pt in seizure precautions, ex wife and brother at , ex wife POA see paperwork in chart. Pt now alert to self, scored 11 on NIH, started on dilantin per neuro, yanes placed, tolerating IVF, refused oral care, will monitor.        Goal: Discharge Needs Assessment  Outcome: Ongoing (interventions implemented as appropriate)

## 2017-06-22 LAB
25(OH)D3 SERPL-MCNC: 23.7 NG/ML (ref 30–100)
ANION GAP SERPL CALCULATED.3IONS-SCNC: 10 MMOL/L
BUN BLD-MCNC: 21 MG/DL (ref 6–20)
BUN/CREAT SERPL: 32.8 (ref 7–25)
CALCIUM SPEC-SCNC: 9.4 MG/DL (ref 8.6–10.5)
CHLORIDE SERPL-SCNC: 103 MMOL/L (ref 98–107)
CO2 SERPL-SCNC: 28 MMOL/L (ref 22–29)
CREAT BLD-MCNC: 0.64 MG/DL (ref 0.76–1.27)
DEPRECATED RDW RBC AUTO: 52.1 FL (ref 37–54)
ERYTHROCYTE [DISTWIDTH] IN BLOOD BY AUTOMATED COUNT: 14.3 % (ref 11.5–14.5)
FOLATE SERPL-MCNC: 8.25 NG/ML (ref 4.78–24.2)
GFR SERPL CREATININE-BSD FRML MDRD: 128 ML/MIN/1.73
GLUCOSE BLD-MCNC: 116 MG/DL (ref 65–99)
HCT VFR BLD AUTO: 36.7 % (ref 40.4–52.2)
HGB BLD-MCNC: 12.2 G/DL (ref 13.7–17.6)
IRON 24H UR-MRATE: 40 MCG/DL (ref 59–158)
IRON SATN MFR SERPL: 21 % (ref 20–50)
MCH RBC QN AUTO: 33.2 PG (ref 27–32.7)
MCHC RBC AUTO-ENTMCNC: 33.2 G/DL (ref 32.6–36.4)
MCV RBC AUTO: 100 FL (ref 79.8–96.2)
PLATELET # BLD AUTO: 348 10*3/MM3 (ref 140–500)
PMV BLD AUTO: 10 FL (ref 6–12)
POTASSIUM BLD-SCNC: 3.5 MMOL/L (ref 3.5–5.2)
RBC # BLD AUTO: 3.67 10*6/MM3 (ref 4.6–6)
SODIUM BLD-SCNC: 141 MMOL/L (ref 136–145)
TIBC SERPL-MCNC: 188 MCG/DL
TRANSFERRIN SERPL-MCNC: 126 MG/DL (ref 200–360)
VIT B12 BLD-MCNC: 946 PG/ML (ref 211–946)
WBC NRBC COR # BLD: 7.64 10*3/MM3 (ref 4.5–10.7)

## 2017-06-22 PROCEDURE — 80048 BASIC METABOLIC PNL TOTAL CA: CPT | Performed by: HOSPITALIST

## 2017-06-22 PROCEDURE — 83540 ASSAY OF IRON: CPT | Performed by: HOSPITALIST

## 2017-06-22 PROCEDURE — 84466 ASSAY OF TRANSFERRIN: CPT | Performed by: HOSPITALIST

## 2017-06-22 PROCEDURE — 85027 COMPLETE CBC AUTOMATED: CPT | Performed by: HOSPITALIST

## 2017-06-22 PROCEDURE — 82306 VITAMIN D 25 HYDROXY: CPT | Performed by: HOSPITALIST

## 2017-06-22 PROCEDURE — 92610 EVALUATE SWALLOWING FUNCTION: CPT

## 2017-06-22 PROCEDURE — 97162 PT EVAL MOD COMPLEX 30 MIN: CPT

## 2017-06-22 PROCEDURE — 82607 VITAMIN B-12: CPT | Performed by: HOSPITALIST

## 2017-06-22 PROCEDURE — 82746 ASSAY OF FOLIC ACID SERUM: CPT | Performed by: HOSPITALIST

## 2017-06-22 RX ORDER — POTASSIUM CHLORIDE 750 MG/1
20 CAPSULE, EXTENDED RELEASE ORAL ONCE
Status: DISCONTINUED | OUTPATIENT
Start: 2017-06-22 | End: 2017-06-22

## 2017-06-22 RX ORDER — POTASSIUM CHLORIDE 1.5 G/1.77G
20 POWDER, FOR SOLUTION ORAL ONCE
Status: COMPLETED | OUTPATIENT
Start: 2017-06-22 | End: 2017-06-22

## 2017-06-22 RX ADMIN — PHENYTOIN 100 MG: 125 SUSPENSION ORAL at 21:17

## 2017-06-22 RX ADMIN — POTASSIUM CHLORIDE 20 MEQ: 1.5 POWDER, FOR SOLUTION ORAL at 16:35

## 2017-06-22 RX ADMIN — PHENYTOIN 100 MG: 125 SUSPENSION ORAL at 16:36

## 2017-06-22 RX ADMIN — PHENYTOIN 100 MG: 125 SUSPENSION ORAL at 11:57

## 2017-06-22 RX ADMIN — ASPIRIN 325 MG: 325 TABLET ORAL at 11:56

## 2017-06-22 RX ADMIN — CLOPIDOGREL 75 MG: 75 TABLET, FILM COATED ORAL at 11:57

## 2017-06-22 NOTE — PAYOR COMM NOTE
"Gm Castillo (57 y.o. Male)     PLEASE SEE ATTACHED CLINICAL REVIEW ON PATIENT.  PT. WAS ADMITTED TO [facility] ON 17 TO A MONITORED TELEM FLOOR.     PLEASE CALL   OR  [phone] WITH CONTINUED STAY AUTHORIZATION AND DAYS APPROVED.     THANK YOU    LEDY LEE, LPN, CCP    Date of Birth Social Security Number Address Home Phone MRN    1960  [address] [place] [address] [phone] [id number]    [employer] Marital Status          None        Admission Date Admission Type Admitting Provider Attending Provider Department, Room/Bed    17 Urgent Marleny Batista MD Hayden, Juliana, MD [address], 519/1    Discharge Date Discharge Disposition Discharge Destination                      Attending Provider: Lashawn Perry MD     Allergies:  No Known Allergies    Isolation:  None   Infection:  None   Code Status:  FULL    Ht:  70\" (177.8 cm)   Wt:  129 lb 1.6 oz (58.6 kg)    Admission Cmt:  None   Principal Problem:  Seizure [R56.9]                 Active Insurance as of 2017     Primary Coverage     Payor Plan Insurance Group Employer/Plan Group    [employer]NA MEDICAID HU[employer]NA [employer] [facility]     Payor Plan Address Payor Plan Phone Number Effective From Effective To    PO  [phone] 2017     [place] [address]       Subscriber Name Subscriber Birth Date Member ID       GM CASTILLO 1960 [id number]                 Emergency Contacts      (Rel.) Home Phone Work Phone Mobile Phone    Nisreen Castillo (Friend) [phone] -- --               History & Physical      Marleny Batista MD at 2017  2:18 PM                            [facility]    Patient Identification:  Name: Gm Castillo  Age/Sex: 57 y.o. male  :  1960  MRN: [id number]         Primary Care Physician: Jaz Devlin DO    No chief complaint on file.        History of Present Illness  Patient is a " 57 y.o. male who presents with an episode of unresponsiveness this a.m. while in acute rehabilitation.  The patient was transferred from [facility] yesterday after an intervention for a subarachnoid and intraventricular hemorrhage with placement of a  shunt with a recurrent bleed in the right parietal lobe near the  shunt.  The patient is minimally responsive and not able to provide any history.  The family is at bedside and providing some history.  While in the hospital he was not able to eat adequately and had a PEG tube placed for failure to thrive.  They indicate that he lost approximately 35 pounds while in the hospital.  He had urinary retention post catheter removal on 6-20 17.  I was unaware of whether he had any urinary tract infections while there.  The patient had previous history of kidney stones but no known BPH or recurrent uric tract infections.  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  Since these events have all occurred he's opened his eyes occasionally talk to family but is not had meaningful conversation.  There is chronic tobacco use of about a pack a day for numerous years without any documented COPD, asthma, recurrent bronchitis.  They're unaware of any history of cardiac or gastrointestinal disease.  Prior to the neurological event he was not on any medications.    Past Medical History:   Diagnosis Date   • Injury of back    • Kidney stones    • Stroke      Past Surgical History:   Procedure Laterality Date   • BACK SURGERY     • BRAIN SURGERY     • KIDNEY STONE SURGERY     •  SHUNT INSERTION       No family history on file.  Social History   Substance Use Topics   • Smoking status: Current Every Day Smoker     Packs/day: 2.00   • Smokeless tobacco: Not on file   • Alcohol use No     Prescriptions Prior to Admission   Medication Sig Dispense Refill Last Dose   • cyclobenzaprine (FLEXERIL) 10 MG tablet Take 1 tablet by mouth 3 (Three)  Times a Day As Needed for Muscle Spasms. 20 tablet 0    • HYDROcodone-acetaminophen (NORCO) 5-325 MG per tablet Take 1 tablet by mouth Every 4 (Four) Hours As Needed for Moderate Pain (4-6). 20 tablet 0    • ibuprofen (ADVIL,MOTRIN) 200 MG tablet Take 800 mg by mouth Every 6 (Six) Hours As Needed for Mild Pain (1-3).   3/18/2017 at Unknown time   • MethylPREDNISolone (MEDROL) 4 MG tablet follow package directions 21 tablet 0      Allergies:  Review of patient's allergies indicates no known allergies.    Review Of Systems:  Unobtainable      Vital Signs  Temp:  [97.5 °F (36.4 °C)-98.5 °F (36.9 °C)] 98.1 °F (36.7 °C)  Heart Rate:  [66-98] 68  Resp:  [14-16] 16  BP: (125-147)/(83-92) 131/91  There is no height or weight on file to calculate BMI.  GENERAL: The patient is a  white male who appears his stated age.   HEENT: PERRLA, pupils are 4 mm in size, patient will not open his mouth for me to look at oral mucosa   NECK: Thyroid is not enlarged, no lymphadenopathy or sternocleidomastoid hypertrophy  CHEST: Normal shape and expansion, no retractions or tenderness  LUNGS: Clear, decreased breath sounds bilaterally  HEART:Regular rate and rhythm, no murmur  ABDOMEN: Soft, nondistended, and mild tenderness in the right upper quadrant where there is a surgical incision according to family.  I was unable to remove the abdominal binder currently   EXTREMITIES: No edema or cyanosis, some spontaneous movement of his legs and arms with obvious muscle wasting  NEUROLOGIC: He responded to his name and stated a few words but not clear.  He did follow commands for me to assess muscle strength which appears to be normal both in upper and lower extremities, no clonus, plantar response bilaterally is equivocal   RECTAL/SCROTAL: Not performed    Assessment/Plan     Principal Problem:    Seizure  Active Problems:    Subarachnoid hemorrhage    Somnolence    Oropharyngeal dysphagia    Protein-calorie malnutrition, moderate      Assessment  & Plan  Seizure: This morning's somnolence is likely related to seizure.  CT scan of brain did not show any evidence of repeat bleeding.  Vimpat to be started.    Oral pharyngeal dysphagia: Continue with PEG tube with Jevity starting at 40 cc an hour and increasing up to 60.  Dietary to see, IV fluids today  Speech therapy to evaluate for any oral intake    PT and OT to evaluate: Will eventually go back to rehabilitation likely in the next 24-48 hours to pain upon status    Protein calorie malnutrition: Extra protein to be added to the current nutrition    Anemia: Unspecified with high MCV check B12 folate and iron studies and vitamin D    Discussed CODE STATUS with his family with plans for him to be a full code currently his current status would not be acceptable to him or take them long-term    I discussed the patients findings and my recommendations with patient.          Marleny Batista MD  [facility]  06/21/17  2:27 PM         Electronically signed by Marleny Batista MD at 6/21/2017  2:33 PM        Emergency Department Notes     No notes of this type exist for this encounter.        Intake & Output (last day)       06/21 0701 - 06/22 0700 06/22 0701 - 06/23 0700    I.V. (mL/kg) 420 (7.2)     Other 274     NG/     Total Intake(mL/kg) 978 (16.7)     Urine (mL/kg/hr) 2150     Total Output 2150      Net -1172            Unmeasured Urine Occurrence 1 x         Hospital Medications (all)       Dose Frequency Start End    aspirin tablet 325 mg 325 mg Daily 6/22/2017     Sig - Route: 1 tablet by Per G Tube route Daily. - Per G Tube    clopidogrel (PLAVIX) tablet 75 mg 75 mg Daily 6/22/2017     Sig - Route: 1 tablet by Per G Tube route Daily. - Per G Tube    dextrose 5 % and sodium chloride 0.45 % infusion 50 mL/hr Continuous 6/21/2017     Sig - Route: Infuse 50 mL/hr into a venous catheter Continuous. - Intravenous    HYDROcodone-acetaminophen (NORCO) 5-325 MG per tablet 1  tablet 1 tablet Every 6 Hours PRN 6/21/2017 6/30/2017    Sig - Route: 1 tablet by Per G Tube route Every 6 (Six) Hours As Needed for Moderate Pain (4-6). - Per G Tube    ondansetron (ZOFRAN) injection 4 mg 4 mg Every 6 Hours PRN 6/21/2017     Sig - Route: Infuse 2 mL into a venous catheter Every 6 (Six) Hours As Needed for Nausea or Vomiting. - Intravenous    phenytoin (DILANTIN) 125 MG/5ML suspension 100 mg 100 mg 3 Times Daily 6/21/2017     Sig - Route: 4 mL by Per G Tube route 3 (Three) Times a Day. - Per G Tube    pneumococcal polysaccharide 23-valent (PNEUMOVAX-23) vaccine 0.5 mL 0.5 mL During Hospitalization 6/21/2017     Sig - Route: Inject 0.5 mL into the shoulder, thigh, or buttocks During Hospitalization for Immunization. - Intramuscular    Cosign for Ordering: Required by Marleny Batista MD    sodium chloride 0.9 % flush 1-10 mL 1-10 mL As Needed 6/21/2017     Sig - Route: Infuse 1-10 mL into a venous catheter As Needed for Line Care. - Intravenous    aspirin tablet 325 mg (Discontinued) 325 mg Daily 6/22/2017 6/21/2017    Sig - Route: Take 1 tablet by mouth Daily. - Oral    clopidogrel (PLAVIX) tablet 75 mg (Discontinued) 75 mg Daily 6/22/2017 6/21/2017    Sig - Route: Take 1 tablet by mouth Daily. - Oral    HYDROcodone-acetaminophen (NORCO) 5-325 MG per tablet 1 tablet (Discontinued) 1 tablet Every 6 Hours PRN 6/21/2017 6/21/2017    Sig - Route: Take 1 tablet by mouth Every 6 (Six) Hours As Needed for Moderate Pain (4-6). - Oral    ondansetron (ZOFRAN) tablet 4 mg (Discontinued) 4 mg Every 8 Hours PRN 6/21/2017 6/21/2017    Sig - Route: Take 1 tablet by mouth Every 8 (Eight) Hours As Needed for Nausea or Vomiting. - Oral            Lab Results (last 24 hours)     Procedure Component Value Units Date/Time    CBC (No Diff) [[id number]]  (Abnormal) Collected:  06/22/17 0517    Specimen:  Blood Updated:  06/22/17 0551     WBC 7.64 10*3/mm3      RBC 3.67 (L) 10*6/mm3      Hemoglobin 12.2 (L) g/dL       Hematocrit 36.7 (L) %      .0 (H) fL      MCH 33.2 (H) pg      MCHC 33.2 g/dL      RDW 14.3 %      RDW-SD 52.1 fl      MPV 10.0 fL      Platelets 348 10*3/mm3     Basic Metabolic Panel [[id number]]  (Abnormal) Collected:  06/22/17 0517    Specimen:  Blood Updated:  06/22/17 0613     Glucose 116 (H) mg/dL      BUN 21 (H) mg/dL      Creatinine 0.64 (L) mg/dL      Sodium 141 mmol/L      Potassium 3.5 mmol/L      Chloride 103 mmol/L      CO2 28.0 mmol/L      Calcium 9.4 mg/dL      eGFR Non African Amer 128 mL/min/1.73      BUN/Creatinine Ratio 32.8 (H)     Anion Gap 10.0 mmol/L     Narrative:       GFR Normal >60  Chronic Kidney Disease <60  Kidney Failure <15    Iron Profile [[id number]]  (Abnormal) Collected:  06/22/17 0517    Specimen:  Blood Updated:  06/22/17 0613     Iron 40 (L) mcg/dL      Iron Saturation 21 %      Transferrin 126 (L) mg/dL      TIBC 188 mcg/dL     Folate [[id number]]  (Normal) Collected:  06/22/17 0517    Specimen:  Blood Updated:  06/22/17 0629     Folate 8.25 ng/mL     Vitamin B12 [[id number]]  (Normal) Collected:  06/22/17 0517    Specimen:  Blood Updated:  06/22/17 0629     Vitamin B-12 946 pg/mL     Vitamin D 25 Hydroxy [[id number]]  (Abnormal) Collected:  06/22/17 0517    Specimen:  Blood Updated:  06/22/17 0630     25 Hydroxy, Vitamin D 23.7 (L) ng/ml     Narrative:       Reference Range for Total Vitamin D 25(OH)     Deficiency    <20.0 ng/mL   Insufficiency 21-29 ng/mL   Sufficiency    ng/mL  Toxicity      >100 ng/ml             Imaging Results (last 24 hours)     Procedure Component Value Units Date/Time    CT Head Without Contrast [[id number]] Collected:  06/21/17 1404     Updated:  06/21/17 1404    Narrative:       CT SCAN HEAD WITHOUT CONTRAST     CLINICAL HISTORY: Follow-up from [facility] earlier. History of subarachnoid  hemorrhage with aneurysm clipping. Shunt for hydrocephalus. Lethargy.     CT scan of the head was obtained with 3 mm axial images. No intravenous  contrast  was administered.     Comparison is made to prior CT scan of the head performed earlier today  at approximately 8:37 AM.     FINDINGS:     When compared to the prior study from earlier today at approximately  8:37 AM, there is no significant interval change. Again noted is a right  occipital approach ventricular catheter which traverses the right  lateral ventricle and terminates within the medial aspect of the right  frontal lobe. The catheter is unchanged in position compared to the  study from earlier today. There is no evidence for hydrocephalus. The  ventricles are stable in size and configuration compared to the previous  exam. Again noted is some hemorrhage along the right occipital approach  ventricular catheter tract. Again, this hemorrhage is seen tracking  anteriorly via thin linear configuration into the posterior aspect right  lentiform nucleus and there are small pockets of hemorrhage noted just  inferior to the catheter tract within the right occipital lobe. Along  the catheter tract, this hemorrhage again measures approximately 2.1 x  1.1 cm in greatest axial dimensions. Edema is again appreciated in this  area as well and these findings are all stable. Intraventricular  hemorrhage is noted dependent within both lateral ventricles. Again,  this is unchanged since the previous exam. Again noted is a right  frontal approach ventricular catheter tract. Thin bilateral subdural  hygromas are again noted and are unchanged. The hygroma lateral to the  right frontal lobe again measures up to 2 to 3 mm in diameter and the  hygroma lateral to left frontal lobe is smaller in size. A tiny focus of  pneumocephalus is again noted.     The patient is status post a stent-assisted coiling of a basilar tip  aneurysm and aneurysm clip is identified at the level of the basilar  summit. The combination of coils and clip again result in prominent  artifact.     There has been a  right temporal craniotomy and there is a  focus of  encephalomalacia within the inferior lateral aspect right temporal lobe  again measuring up to approximately 2.5 x 1.1 cm in greatest axial  dimensions. Again, this is unchanged. A rounded area of hyperdensity  within the right sylvian fissure measuring up to approximately 4 mm in  diameter demonstrates no convincing interval change and suspicious for a  right MCA bifurcation aneurysm.       Impression:          No significant interval change is seen when compared to the head CT  performed earlier today at approximately 8:37 AM.           Orders (last 24 hrs)     Start     Ordered    06/22/17 0900  aspirin tablet 325 mg  Daily,   Status:  Discontinued      06/21/17 1200    06/22/17 0900  clopidogrel (PLAVIX) tablet 75 mg  Daily,   Status:  Discontinued      06/21/17 1200    06/22/17 0900  aspirin tablet 325 mg  Daily      06/21/17 1206    06/22/17 0900  clopidogrel (PLAVIX) tablet 75 mg  Daily      06/21/17 1206    06/22/17 0750  Inpatient Consult to Rehab Admission  Once     Provider:  (Not yet assigned)    06/22/17 0749    06/22/17 0730  Inpatient Consult to Rehab Admission  Once     Provider:  (Not yet assigned)    06/22/17 0730    06/22/17 0600  Basic Metabolic Panel  Morning Draw      06/21/17 1200    06/22/17 0600  CBC (No Diff)  Morning Draw      06/21/17 1200    06/22/17 0600  Folate  Morning Draw      06/21/17 1433    06/22/17 0600  Iron Profile  Morning Draw      06/21/17 1433    06/22/17 0600  Vitamin B12  Morning Draw      06/21/17 1433    06/22/17 0600  Vitamin D 25 Hydroxy  Morning Draw      06/21/17 1433    06/22/17 0600  CBC (No Diff)  Morning Draw,   Status:  Canceled      06/21/17 1433    06/22/17 0339  please send arm board thanks  Once      06/22/17 0338    06/21/17 1649  Inpatient Consult to Case Management   Once     Provider:  (Not yet assigned)    06/21/17 1649    06/21/17 1647  pneumococcal polysaccharide 23-valent (PNEUMOVAX-23) vaccine 0.5 mL  During  Hospitalization      06/21/17 1647    06/21/17 1600  phenytoin (DILANTIN) 125 MG/5ML suspension 100 mg  3 Times Daily      06/21/17 1506    06/21/17 1357  Jevity 1.2 (Standard With Fiber); Tube Feeding Type: Continuous; Continuous Tube Feeding Start Rate (mL/hr): 20; Then Advance Rate By (mL/hr): 10; Every __ Hours: 8; To Goal Rate of (mL/hr): 60; Patient is NPO (No Tray)  Diet Effective Now      06/21/17 1356    06/21/17 1356  Elevate Head Of Bed 30-45 Degrees  Continuous      06/21/17 1356    06/21/17 1245  dextrose 5 % and sodium chloride 0.45 % infusion  Continuous      06/21/17 1200    06/21/17 1206  Feeding Tube Care For PEG  Once      06/21/17 1206    06/21/17 1206  jevity 1.2 at 40 cc. Hr and advance to 60 cc/hr  Once     Comments:  jevity 1.2 at 40 cc. Hr and advance to 60 cc/hr    06/21/17 1206    06/21/17 1205  Diet Instructions To Nursing  Until Discontinued      06/21/17 1206    06/21/17 1204  Insert Indwelling Urinary Catheter  Once      06/21/17 1206    06/21/17 1204  Assess Need for Indwelling Urinary Catheter - Follow Removal Protocol  Continuous     Comments:  Indwelling Urinary Catheter Removal Criteria  Discontinue Indwelling Urinary Catheter Unless One of the Following is Present  Urinary Retention or Obstruction  Chronic Shanks Catheter Use  End of Life  Critical Illness with Strict I/O   Tract or Abdominal Surgery  Stage 3/4 Sacral / Perineal Wound  Required Activity Restriction: Trauma  Required Activity Restriction: Spine Surgery  If Patient is Being Followed by Urology Contact Them PRIOR to Removal  Do Not Remove Indwelling Urinary Catheter Order is Present with a CLINICAL REASON to Maintain the Catheter. Provider is Required to Include a Clinical Reason to Maintain a Urinary Catheter    Chronic Shanks Catheter Use (Present on Admission)  Assess for Continued Need & Document Medical Necessity  If Infection is Suspected, Contact the Provider        06/21/17 1206    06/21/17 1204  Catheter  Care  Every Shift      06/21/17 1206    06/21/17 1201  HYDROcodone-acetaminophen (NORCO) 5-325 MG per tablet 1 tablet  Every 6 Hours PRN      06/21/17 1206    06/21/17 1200  Vital Signs (specify frequency)  Every 4 Hours      06/21/17 1200    06/21/17 1200  Fall Precautions  Continuous      06/21/17 1200    06/21/17 1200  Maintain Sequential Compression Device  Continuous      06/21/17 1200    06/21/17 1200  Inpatient Consult to Psychology  Once     Specialty:  Psychology  Provider:  Rafita Ness, PhD    06/21/17 1200    06/21/17 1200  Oral Care  Once,   Status:  Canceled      06/21/17 1200    06/21/17 1200  OT Consult: Eval & Treat SAH/IVH  Once      06/21/17 1200    06/21/17 1200  PT Consult: Eval & Treat  Once      06/21/17 1200    06/21/17 1200  SLP Consult: Eval & Treat  Once      06/21/17 1200    06/21/17 1200  Vital Signs  Per Hospital Policy      06/21/17 1200    06/21/17 1200  Elevate Head Of Bed 30-45 Degrees  Continuous      06/21/17 1200    06/21/17 1200  Inpatient Consult to Nutrition Services  Once     Provider:  (Not yet assigned)    06/21/17 1200    06/21/17 1200  Weigh Patient Weekly  Weekly,   Status:  Canceled      06/21/17 1200    06/21/17 1200  Strict Intake & Output  Every Shift      06/21/17 1200    06/21/17 1200  Write Hang Time on Enteral Feeding Bag  Continuous     Comments:  Enteral Nutrition Bag May Hang for 24 Hours.   If Cans in Bags, Hang Time Limited to 4 Hours.    06/21/17 1200    06/21/17 1200  Notify Provider - Abdominal Discomfort, Pain or Distention, No Bowel Movement in 3 Days  Until Discontinued      06/21/17 1200    06/21/17 1200  Swallow Precautions  Continuous     Comments:  Supervision with cues to swallow.  90 degree hip flexion with all PO intake. Oral care.    06/21/17 1200    06/21/17 1200  Nursing Communication Yanes was removed 6/20 prior to transfer--incontinent urine in brief after yanes removed  Continuous     Comments:  Yanes was removed 6/20 prior to  transfer--incontinent urine in brief after yanes removed    06/21/17 1200    06/21/17 1200  ondansetron (ZOFRAN) tablet 4 mg  Every 8 Hours PRN,   Status:  Discontinued      06/21/17 1200    06/21/17 1200  HYDROcodone-acetaminophen (NORCO) 5-325 MG per tablet 1 tablet  Every 6 Hours PRN,   Status:  Discontinued      06/21/17 1200    06/21/17 1200  Nursing Communication Follow up with Guero ORTIZ--[address] [facility] [address]   [address] [phone]  Continuous     Comments:  Follow up with Guero ORTIZ--[address] [facility] [address]   [place] [phone]  [phone]    06/21/17 1200    06/21/17 1200  Patient May Shower  Once,   Status:  Canceled      06/21/17 1200    06/21/17 1200  No Heavy Lifting  Continuous      06/21/17 1200    06/21/17 1200  Tobacco Cessation Education  Once      06/21/17 1200    06/21/17 1200  NIHSS Assessment  Every Shift     Comments:  Turn off all sedation medications prior to performing assessment. Assessment to be performed upon admission, transfer to another unit, discharge, and with neurological decline. If NIHSS change is greater than or equal to 4 and/or neurological decline is noted notify physician.    06/21/17 1200    06/21/17 1200  Straight Cath  Every 6 Hours     Comments:  Until Dr Baker can assess    06/21/17 1200    06/21/17 1200  CT Head Without Contrast  1 Time Imaging      06/21/17 1200    06/21/17 1200  NPO Diet  Diet Effective Now,   Status:  Canceled     Comments:  NPO until stroke dysphagia screen completed. Evaluate diet post-screen.    06/21/17 1200    06/21/17 1200  Inpatient Consult to Neurology  Once     Specialty:  Neurology  Provider:  Charan Key MD    06/21/17 1200    06/21/17 1159  ondansetron (ZOFRAN) injection 4 mg  Every 6 Hours PRN      06/21/17 1200    06/21/17 1159  Oxygen Therapy-  Continuous      06/21/17 1200    06/21/17 1159  NPO Diet  Diet Effective Now,   Status:  Canceled      06/21/17 1200     06/21/17 1158  Full Code  Continuous      06/21/17 1200    06/21/17 1158  VTE Risk Assessment - Moderate Risk  Once      06/21/17 1200    06/21/17 1158  Pharmacologic VTE Prophylaxis Not Indicated: Severe Head / Spinal Cord Trauma (Past 4 Weeks)  Once      06/21/17 1200    06/21/17 1158  Place Sequential Compression Device  Once      06/21/17 1200    06/21/17 1158  Maintain Sequential Compression Device  Continuous      06/21/17 1200    06/21/17 1158  Saline Lock  Continuous,   Status:  Canceled      06/21/17 1200    06/21/17 1158  Continuous Pulse Oximetry  Continuous     Comments:  For Unresponsiveness, Acute Dyspnea, Cyanosis or Suspected Hypoxemia.  Notify Physician    06/21/17 1200    06/21/17 1158  Oxygen Therapy- Nasal Cannula; Titrate for SPO2: 90%, equal to or greater than  Continuous     Comments:  For Unresponsiveness, Acute Dyspnea, Cyanosis or Suspected Hypoxemia.  Notify Physician    06/21/17 1200    06/21/17 1158  Nurse to Place Order for ECG 12-Lead if Patient Experiences Acute Chest Pain or Dysrhythmias  Continuous      06/21/17 1200    06/21/17 1158  May Be Off Telemetry for Tests  Continuous      06/21/17 1200    06/21/17 1158  ACLS Protocol For Life Threatening Dysrhythmias (If No DNR Order)  Continuous      06/21/17 1200    06/21/17 1158  Notify Provider if ACLS Protocol Activated  Until Discontinued      06/21/17 1200    06/21/17 1157  Cardiac Monitoring  Continuous      06/21/17 1200    06/21/17 1157  Weigh patient  Once      06/21/17 1200    06/21/17 1157  Insert Peripheral IV  Once      06/21/17 1200    06/21/17 1157  Saline Lock & Maintain IV Access  Continuous      06/21/17 1200    06/21/17 1157  Inpatient Admission  Once      06/21/17 1200    06/21/17 1156  sodium chloride 0.9 % flush 1-10 mL  As Needed      06/21/17 1200    Unscheduled  Potassium  As Needed     Comments:  For Ventricular Arrhythmias    06/21/17 1200    Unscheduled  Magnesium  As Needed     Comments:  For Ventricular  Arrhythmias    17 1200    Unscheduled  Troponin  As Needed     Comments:  For Chest Pain    17 1200    Unscheduled  Blood Gas, Arterial  As Needed     Comments:  Per O2 Policy  Notify Physician    17 1200    Unscheduled  Benjamin and Document Tube Depth (in cm)  As Needed      17 1200    Unscheduled  Oral Care  As Needed      17 1200    Unscheduled  Flush Feeding Tube With 30-50mL Water As Needed  As Needed      17 1200    Unscheduled  Residual Volume Assessment - Gastric Feedings  As Needed     Comments:  Measure Gastric Residual Volume if Patient Complains of Nausea, Abdominal Distention, Pain, Tenderness or Firmness  If Residual Volume Greater Than 500 mL, Re-Feed Amount That Was Removed, Hold Tube Feeding for 1 Hour & Recheck Residual.  Notify Provider if Second Residual Remains Greater Than 500 mL  Document Residual Volume, Amount Discarded & Appearance of Residual.    17 1200    Unscheduled  Benjamin and Document Tube Depth (in cm)  As Needed      17 1356    Unscheduled  Residual Volume Assessment - Gastric Feedings  As Needed     Comments:  Measure Gastric Residual Volume if Patient Complains of Nausea, Abdominal Distention, Pain, Tenderness or Firmness  If Residual Volume Greater Than 500 mL, Re-Feed Amount That Was Removed, Hold Tube Feeding for 1 Hour & Recheck Residual.  Notify Provider if Second Residual Remains Greater Than 500 mL  Document Residual Volume, Amount Discarded & Appearance of Residual.    17 1356               Consult Notes (last 24 hours) (Notes from 2017 10:09 AM through 2017 10:09 AM)      Charan Key MD at 2017 11:53 AM  Version 1 of 1         DOS: 2017  NAME: Rafita Davis   : 1960  PCP: Jaz Devlin DO  CC: Unresponsive     Referring MD: Marleny Batista, *     I reviewed the records from [place]    Neurological Problem and Interval History:  57 y.o. RHW male with a Hx of smoking and cerebral  aneurysm clipping 20y ago.  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  He had not been heard from for 2 days by his family and was found having convulsions and biting his tongue. Subsequently treated with stent assisted coiling.  He developed hydrocephalus and needed a  shunt.  This was complicated by hemorrhage along the tract.  He did develop vasospasm documented by CT angiography and he is reported to have a 5mm right MCA aneurysm.  The family states that he has been progressively declining for the past 2 weeks and has not been eating.  He just had a PEG placed 2-3 days ago and he has lost a great deal of weight.  They denies any stroke or TIA symptoms and they denies any seizure activity in the hospital.  He was last at his baseline at 4:30 AM when the morning nurse went in to evaluate him he was very sleepy difficult to arouse and not answering questions is briskly.  She thought he might of had some left-sided weakness as well.  A rapid response was called and I ordered a stat CT scan of the brain.  Since then he has slowly improved and per his ex-wife who is medical decision maker he is back to the baseline.  This consists of significant memory trouble and confusion and episodes of going in and out.  He was smoking 2 packs per day.    Past Medical/Surgical Hx:  Past Medical History:   Diagnosis Date   • Injury of back    • Kidney stones    • Stroke      Past Surgical History:   Procedure Laterality Date   • BACK SURGERY     • BRAIN SURGERY     • KIDNEY STONE SURGERY     •  SHUNT INSERTION         Review of Systems:        A complete review of all systems is negative except as described above.    Medications On Admission  Prescriptions Prior to Admission   Medication Sig Dispense Refill Last Dose   • cyclobenzaprine (FLEXERIL) 10 MG tablet Take 1 tablet by mouth 3 (Three) Times a Day As Needed for Muscle Spasms. 20  tablet 0    • HYDROcodone-acetaminophen (NORCO) 5-325 MG per tablet Take 1 tablet by mouth Every 4 (Four) Hours As Needed for Moderate Pain (4-6). 20 tablet 0    • ibuprofen (ADVIL,MOTRIN) 200 MG tablet Take 800 mg by mouth Every 6 (Six) Hours As Needed for Mild Pain (1-3).   3/18/2017 at Unknown time   • MethylPREDNISolone (MEDROL) 4 MG tablet follow package directions 21 tablet 0        Allergies:  No Known Allergies    Social Hx:  Social History     Social History   • Marital status:      Spouse name: N/A   • Number of children: N/A   • Years of education: N/A     Occupational History   • Not on file.     Social History Main Topics   • Smoking status: Current Every Day Smoker     Packs/day: 2.00   • Smokeless tobacco: Not on file   • Alcohol use No   • Drug use: Yes     Special: Marijuana   • Sexual activity: Defer     Other Topics Concern   • Not on file     Social History Narrative       Family Hx:  No family history on file.    Review of Imaging (Interpretation of images not reports):  CT brain: There is evidence of a right occipital EVD catheter with hemorrhage around the track, there is significant beam hardening artifact from a coil mass in the region of the basilar apex as well as a stent in the basilar artery,  Physical Examination:  There were no vitals taken for this visit.  General Appearance:   Well developed, Thin bordering on cachectic, well groomed, alert, and cooperative.  HEENT: Post EVD and  shunt scars and hardware.  Normal fundoscopic exam including normal retina, discs are flat with sharp margins, normal vasculature.  Neck and Spine: Normal range of motion.  Normal alignment. No mass or tenderness. No bruits.  Cardiac: Regular rate and rhythm. No murmurs.  Peripheral Vasculature: Radial and pedal pulses are equal 1+ and symmetric. No signs of distal embolization.  Extremities:    No edema or deformities. Normal joint ROM. DEE hoses and SCD's in place  Skin:    No rashes or birth  marks.    Neurological examination:  Higher Integrative  Function: Sleepy but arouses with frequent stimulation, he is very inattentive.  Oriented to self.  He was able to name his ex-wife but not his brother.  He couldn't register 3 out of 3 objects but recall 0 out of 3 at 5 minutes.  Language is normal.  There is no obvious neglect.  Higher integrative function is poor.  Fund of knowledge is poor.  CN II: Pupils are slightly unequal with the right slightly larger, round, and reactive to light. Normal visual fields to confrontation.    CN III IV VI: Extraocular movements are full without nystagmus.   CN V: Normal facial sensation and strength of muscles of mastication.  CN VII: Facial movements are symmetric. No weakness.  CN VIII:   Auditory acuity is normal.  CN IX & X:   Symmetric palatal movement.  CN XI: Sternocleidomastoid and trapezius are normal.  No weakness.  CN XII:   The tongue is midline.  No atrophy or fasciculations.  Motor: Probably normal muscle strength although he gave poor effort, decreased bulk and normal tone in upper and lower extremities.  No fasciculations, rigidity, spasticity, or abnormal movements.  Reflexes: 2+ in the upper and lower extremities. Plantar responses are flexor on the right and extensor on the left.  Sensation: Normal to light touch in arms and legs, he was not attentive enough to test other modalities reliably.  Station and Gait: Unable to get up or walk.    Coordination: Finger to nose test shows no dysmetria.  Rapid alternating movements are normal.  Heel to shin he would not perform    Diagnoses / Discussion:  57 y.o. who presents with Sx of decreased responsiveness now slowly improving in the setting of subarachnoid hemorrhage, vasospasm and hydrocephalus.  He is back to his baseline which consists of severe amnesia cognitive deficits which is entirely consistent with a recent subarachnoid hemorrhage.  Given that he presented with a seizure I suspect he had a seizure  this morning and he was postictal.  Therefore an EEG is appropriate.  I do not see a need for urgent vascular imaging since given the artifact from metal the only way to make sure everything is open would be with a cerebral angiogram.    Plan:  CT brain stat (done)  EEG stat  Aspirin 325mg  Plavix 75mg  Start antiepileptic- avoid Keppra because of its sedating and cognitive side effects- phenytoin 300 mg QD or Vimpat 100 mg twice a day  Hydrate  Neurochecks  Non-pharmacological DVT prophylaxis  EKG Tele  PT/OT/ST  Stroke Education  Blood pressure control to <130/80  Goal LDL <70-recommend high dose statins-   Serum glucose < 140     Call 911 for stroke any stroke symptoms    I have discussed the above with the Dr Harding, patient and family.  Time spent with patient: 60min    MDM  Reviewed: previous chart and vitals  Interpretation: CT scan and labs      Dictated via dragon dictation     Electronically signed by Charan Key MD at 2017  3:05 PM        Emiliana Jordan Speech and Language Pathologist Addendum Speech Therapy Therapy Evaluation Date of Service: 2017  8:46 AM      Expand All Collapse All      Acute Care - Speech Language Pathology   Swallow Initial Evaluation [facility]     Patient Name: Rafita Davis  : 1960  MRN: [id number]  Today's Date: 2017                Admit Date: 2017     SPEECH-LANGUAGE PATHOLOGY EVALUATION - SWALLOW  Subjective: The patient was seen on this date for a Clinical Swallow evaluation. Pt lethargic, not following commands.   The patient's history is significant for SAH and IVH s/p  shunt with recurrent R parietal bleed. Pt was unresponsive when admitted to [facility] and was transferred to inpatient floor. Team suspects seizure at this time. Pt with PEG, but was on puree and thins piror to transfer to [facility] d/t oral dysphagia. Per neuro note, pt was oriented to name and labeling some objects last date. Pt verbalized only x1  during bedside swallow despite multiple visual, verbal, and tactile alerting cues.   Objective: Textures given included thin liquid.  Assessment: Difficulties were noted with thin liquid, characterized by multiple swallows (>3) with small bolus thins via straw. No overt s/s of asp with ice or thins via spoon this date (cup not attempted d/t patient's position in bed, leaning to left despite repositioning. Pt with clear voice following ice and thins via spoon trials, pt would not voice following straw trials. Laryngeal elevation appeared adequate. Pt with swallow delay.   SLP Findings:  Patient presents with s/s of dysphagia worsened by cognitive status.   Recommendations: Diet Textures: NPO. May have ice and water via spoon after oral care, under staff or family supervision and with the recommended strategies for safe swallowing.   Recommended Strategies: Upright for PO and no straw. Oral care before breakfast, after all meals and PRN.  Other Recommended Evaluations: Re-evaluation at bedside   Dysphagia therapy is recommended. Rationale: to establish PO diet.     Visit Dx:   No diagnosis found.      Patient Active Problem List   Diagnosis   • Subarachnoid bleed   • Subarachnoid hemorrhage   • Somnolence   • Oropharyngeal dysphagia   • Seizure   • Protein-calorie malnutrition, moderate       Medical History         Past Medical History:   Diagnosis Date   • Injury of back     • Kidney stones     • Stroke            Surgical History          Past Surgical History:   Procedure Laterality Date   • BACK SURGERY       • BRAIN SURGERY       • KIDNEY STONE SURGERY       •  SHUNT INSERTION                   SWALLOW EVALUATION (last 72 hours)       Swallow Evaluation        06/22/17 1402                      Rehab Evaluation     Document Type evaluation  -SA             Symptoms Noted During/After Treatment none  -SA             General Information     Subjective Patient Observations Nonverbal  -SA             Pertinent  History Of Current Problem dysphagia post SAH, IVH  -SA             Current Diet Limitations NPO  -SA             Prior Level of Function- Swallowing diet modifications- foods  -SA             Plans/Goals Discussed With patient  -SA             Barriers to Rehab medically complex;previous functional deficit  -             Clinical Impression     Patient's Goals For Discharge patient did not state  -SA             SLP Swallowing Diagnosis oral dysfunction  -SA             Rehab Potential/Prognosis, Swallowing good, to achieve stated therapy goals  -SA             Criteria for Skilled Therapeutic Interventions Met skilled criteria for dysphagia intervention met  -SA             FCM, Swallowing 2-->Level 2  -SA             Therapy Frequency PRN  -SA             Predicted Duration Therapy Interv (days) until discharge  -SA             SLP Diet Recommendation other (see comments)   water protocol  -SA             Recommended Diagnostics reassess via clinical swallow (non-instrumental exam)  -             Monitor For Signs Of Aspiration cough;elevated WBC count;throat clearing;gurgly voice;fever  -             Anticipated Discharge Disposition inpatient rehabilitation facility  -             Pain Assessment     Pain Assessment Unable to assess  -SA             Oral Motor Structure and Function     Oral Motor Assessment Comment Unable to assess, pt would not follow commands  -SA                  User Key  (r) = Recorded By, (t) = Taken By, (c) = Cosigned By    Initials Name Effective Dates      Emiliana Armijoern 12/11/15 -       EDUCATION  The patient has been educated in the following areas:   Dysphagia (Swallowing Impairment) Oral Care/Hydration.     SLP Recommendation and Plan  SLP Swallowing Diagnosis: oral dysfunction  SLP Diet Recommendation: other (see comments) (water protocol)        Monitor For Signs Of Aspiration: cough, elevated WBC count, throat clearing, gurgly voice, fever  Recommended  "Diagnostics: reassess via clinical swallow (non-instrumental exam)  Criteria for Skilled Therapeutic Interventions Met: skilled criteria for dysphagia intervention met  Anticipated Discharge Disposition: inpatient rehabilitation facility  Rehab Potential/Prognosis, Swallowing: good, to achieve stated therapy goals  Therapy Frequency: PRN              Plan of Care Review  Plan Of Care Reviewed With: patient  Progress: no change  Outcome Summary/Follow up Plan: Pt nonverbal this AM, excluding one instance where he said, \"oh shit\" . Poor participation. No overt s/s of asp with ice or thins via spoon. SLP recs free water protocol with ice and thins via spoon.                SLP Outcome Measures (last 72 hours)       SLP Outcome Measures        06/22/17 0800                SLP Outcome Measures     Outcome Measure Used? Adult NOMS  -         FCM Scores     FCM Chosen Swallowing  -         Swallowing FCM Score 2  -              User Key  (r) = Recorded By, (t) = Taken By, (c) = Cosigned By    Initials Name Effective Dates      Emiliana Jordan 12/11/15 -             Time Calculation:           Time Calculation- SLP        06/22/17 0846                Time Calculation- SLP     SLP Received On 06/22/17  -              User Key  (r) = Recorded By, (t) = Taken By, (c) = Cosigned By    Initials Name Provider Type     SA Emiliana Jordan Speech and Language Pathologist            Therapy Charges for Today     Code Description Service Date Service Provider Modifiers Qty     16578593310 HC ST EVAL ORAL PHARYNG SWALLOW 3 6/22/2017 Emiliana Jordan GN 1                  Emiliana Jordan  6/22/2017          Revision History       Date/Time User Provider Type Action     6/22/2017  8:52 AM Emiliana Jordan Speech and Language Pathologist Addend     6/22/2017  8:51 AM Emiliana Jordan Speech and Language Pathologist Sign     View Details Report                      Tara Milian RD " "Registered Dietitian Signed Nutrition Consults Date of Service: 6/21/2017  1:59 PM     Consult Orders:     Inpatient Consult to Nutrition Services [[id number]] ordered by Marleny Batista MD at 06/21/17 0907             []Hide copied text  []Riaz for attribution information  Adult Nutrition  Assessment/PES     Patient Name: Rafita Davis  YOB: 1960  MRN: [id number]  Admit Date: 6/21/2017     Assessment Date: 6/21/2017      Consult received- ordered TF-Jevity 1.2 goal rate @ 60 cc/hr, fluid flushes 15 cc q hour; RD to monitor and follow          Reason for Assessment        06/21/17 1356          Reason for Assessment     Reason For Assessment/Visit nurse/nurse practitioner consult;identified at risk by screening criteria;TF/PN     Diagnosis --   bleeding in brain                Anthropometrics        06/21/17 1357          Anthropometrics (Special Considerations)     Height Used for Calculations 1.778 m (5' 10\")     Weight Used for Calculations 53.1 kg (117 lb)     RD Calculated IBW 73     RD Calculated % IBW 72     RD Calculated BMI (kg/m2) 16.7     Body Mass Index (BMI)     BMI Grade 16 - 16.9 low grade II             Labs/Tests/Procedures/Meds        06/21/17 1358          Labs/Tests/Procedures/Meds     Diagnostic Test/Procedure Review reviewed     Labs/Tests Review Reviewed;Glucose     Medication Review Reviewed, pertinent   aspirin, D5% with NaCl     Significant Vitals reviewed             Physical Findings        06/21/17 1358          Physical Findings/Assessment     Additional Documentation Physical Appearance (Group)   B=15     Physical Appearance     Overall Physical Appearance underweight     Tubes peg tube             Estimated/Assessed Needs        06/21/17 1358          Calculation Measurements     Weight Used For Calculations 53.1 kg (117 lb)     Height Used for Calculations 1.778 m (5' 10\")     Estimated/Assessed Energy Needs     Energy Need Method Kcal/kg     kcal/kg 30     30 " Kcal/Kg (kcal) 1592.13     Estimated/Assessed Protein Needs     Weight Used for Protein Calculation 53.1 kg (117 lb)     Protein (gm/kg) 1.0     1.0 Gm Protein (gm) 53.07     Estimated/Assessed Fluid Needs     Fluid Need Method RDA method     RDA Method (mL)  1600             Nutrition Prescription Ordered        06/21/17 1359          Nutrition Prescription PO     Current PO Diet NPO                  Problem/Interventions:         Problem 1        06/21/17 1359          Nutrition Diagnoses Problem 1     Problem 1 Needs Alternate Route     Etiology (related to) MNT for Treatment/Condition     Signs/Symptoms (evidenced by) NPO                      Intervention Goal        06/21/17 1359          Intervention Goal     General Maintain nutrition;Nutrition support treatment     TF/PN Inititiate TF/PN;Tolerate TF at goal     Weight Appropriate weight gain             Nutrition Intervention        06/21/17 1359          Nutrition Intervention     RD/Tech Action Follow Tx progress;Care plan reviewd             Nutrition Prescription        06/21/17 1359          Nutrition Prescription EN     Enteral Prescription Enteral begin/change     Enteral Route PEG     Product Jevity 1.2 cheyenen     TF Delivery Method Continuous     Continuous TF Goal Rate (mL/hr) 60 mL/hr     Water flush (mL)  15 mL     Water Flush Frequency Per hour     New EN Prescription Ordered? Yes             Education/Evaluation        06/21/17 1359          Education     Education Will Instruct as appropriate     Monitor/Evaluation     Monitor Per protocol     Education Follow-up Reinforce PRN         Electronically signed by:  Tara Milian RD  06/21/17 2:00 PM          Routing History       Date/Time From To Method     6/21/2017  2:00 PM MELISSA Patel MD In Basket

## 2017-06-22 NOTE — THERAPY EVALUATION
Acute Care - Speech Language Pathology   Swallow Initial Evaluation [facility]     Patient Name: Rafita Davis  : 1960  MRN: [id number]  Today's Date: 2017               Admit Date: 2017    SPEECH-LANGUAGE PATHOLOGY EVALUATION - SWALLOW  Subjective: The patient was seen on this date for a Clinical Swallow evaluation.  Pt lethargic, not following commands.    The patient's history is significant for SAH and IVH s/p  shunt with recurrent R parietal bleed. Pt was unresponsive when admitted to [facility] and was transferred to inpatient floor. Team suspects seizure at this time. Pt with PEG, but was on puree and thins piror to transfer to [facility] d/t oral dysphagia.  Per neuro note, pt was oriented to name and labeling some objects last date. Pt verbalized only x1 during bedside swallow despite multiple visual, verbal, and tactile alerting cues.   Objective: Textures given included thin liquid.  Assessment: Difficulties were noted with thin liquid, characterized by multiple swallows (>3) with small bolus thins via straw. No overt s/s of asp with ice or thins via spoon this date (cup not attempted d/t patient's position in bed, leaning to left despite repositioning. Pt with clear voice following ice and thins via spoon trials, pt would not voice following straw trials. Laryngeal elevation appeared adequate. Pt with swallow delay.   SLP Findings:  Patient presents with s/s of dysphagia worsened by cognitive status.   Recommendations: Diet Textures: NPO. May have ice and water via spoon after oral care, under staff or family supervision and with the recommended strategies for safe swallowing.   Recommended Strategies: Upright for PO and no straw. Oral care before breakfast, after all meals and PRN.  Other Recommended Evaluations: Re-evaluation at bedside    Dysphagia therapy is recommended. Rationale: to establish PO diet.    Visit Dx:   No diagnosis found.  Patient Active Problem List   Diagnosis    • Subarachnoid bleed   • Subarachnoid hemorrhage   • Somnolence   • Oropharyngeal dysphagia   • Seizure   • Protein-calorie malnutrition, moderate     Past Medical History:   Diagnosis Date   • Injury of back    • Kidney stones    • Stroke      Past Surgical History:   Procedure Laterality Date   • BACK SURGERY     • BRAIN SURGERY     • KIDNEY STONE SURGERY     •  SHUNT INSERTION            SWALLOW EVALUATION (last 72 hours)      Swallow Evaluation       06/22/17 0843                Rehab Evaluation    Document Type evaluation  -SA        Symptoms Noted During/After Treatment none  -SA        General Information    Subjective Patient Observations Nonverbal  -SA        Pertinent History Of Current Problem dysphagia post SAH, IVH  -SA        Current Diet Limitations NPO  -SA        Prior Level of Function- Swallowing diet modifications- foods  -SA        Plans/Goals Discussed With patient  -SA        Barriers to Rehab medically complex;previous functional deficit  -SA        Clinical Impression    Patient's Goals For Discharge patient did not state  -SA        SLP Swallowing Diagnosis oral dysfunction  -SA        Rehab Potential/Prognosis, Swallowing good, to achieve stated therapy goals  -SA        Criteria for Skilled Therapeutic Interventions Met skilled criteria for dysphagia intervention met  -SA        FCM, Swallowing 2-->Level 2  -SA        Therapy Frequency PRN  -SA        Predicted Duration Therapy Interv (days) until discharge  -SA        SLP Diet Recommendation other (see comments)   water protocol  -SA        Recommended Diagnostics reassess via clinical swallow (non-instrumental exam)  -SA        Monitor For Signs Of Aspiration cough;elevated WBC count;throat clearing;gurgly voice;fever  -SA        Anticipated Discharge Disposition inpatient rehabilitation facility  -SA        Pain Assessment    Pain Assessment Unable to assess  -SA        Oral Motor Structure and Function    Oral Motor Assessment  "Comment Unable to assess, pt would not follow commands  -          User Key  (r) = Recorded By, (t) = Taken By, (c) = Cosigned By    Initials Name Effective Dates    SA Emiliana Armijoern 12/11/15 -         EDUCATION  The patient has been educated in the following areas:   Dysphagia (Swallowing Impairment) Oral Care/Hydration.    SLP Recommendation and Plan  SLP Swallowing Diagnosis: oral dysfunction  SLP Diet Recommendation: other (see comments) (water protocol)        Monitor For Signs Of Aspiration: cough, elevated WBC count, throat clearing, gurgly voice, fever  Recommended Diagnostics: reassess via clinical swallow (non-instrumental exam)  Criteria for Skilled Therapeutic Interventions Met: skilled criteria for dysphagia intervention met  Anticipated Discharge Disposition: inpatient rehabilitation facility  Rehab Potential/Prognosis, Swallowing: good, to achieve stated therapy goals  Therapy Frequency: PRN             Plan of Care Review  Plan Of Care Reviewed With: patient  Progress: no change  Outcome Summary/Follow up Plan: Pt nonverbal this AM, excluding one instance where he said, \"oh shit\" . Poor participation. No overt s/s of asp with ice or thins via spoon. SLP recs free water protocol with ice and thins via spoon.            SLP Outcome Measures (last 72 hours)      SLP Outcome Measures       06/22/17 0800          SLP Outcome Measures    Outcome Measure Used? Adult NOMS  -      FCM Scores    FCM Chosen Swallowing  -      Swallowing FCM Score 2  -        User Key  (r) = Recorded By, (t) = Taken By, (c) = Cosigned By    Initials Name Effective Dates    SA Emiliana Valeriobeth Nikki 12/11/15 -            Time Calculation:         Time Calculation- SLP       06/22/17 0846          Time Calculation- SLP    SLP Received On 06/22/17  -        User Key  (r) = Recorded By, (t) = Taken By, (c) = Cosigned By    Initials Name Provider Type    SA Emiliana Jordan Speech and Language Pathologist    "       Therapy Charges for Today     Code Description Service Date Service Provider Modifiers Qty    [id number]  ST EVAL ORAL PHARYNG SWALLOW 3 6/22/2017 Emiliana Jordan GN 1               Emiliana Jordan  6/22/2017

## 2017-06-22 NOTE — PLAN OF CARE
Problem: Nutrition, Enteral (Adult)  Goal: Signs and Symptoms of Listed Potential Problems Will be Absent or Manageable (Nutrition, Enteral)  Outcome: Ongoing (interventions implemented as appropriate)    Problem: Stroke (Hemorrhagic) (Adult)  Goal: Signs and Symptoms of Listed Potential Problems Will be Absent or Manageable (Stroke)  Outcome: Ongoing (interventions implemented as appropriate)    Problem: Fall Risk (Adult)  Goal: Absence of Falls  Outcome: Ongoing (interventions implemented as appropriate)    Problem: Urine Elimination, Impaired (Adult)  Goal: Effective Urinary Elimination  Outcome: Ongoing (interventions implemented as appropriate)  Goal: Effective Containment of Urine  Outcome: Ongoing (interventions implemented as appropriate)  Goal: Reduced Incontinence Episodes  Outcome: Ongoing (interventions implemented as appropriate)    Problem: Patient Care Overview (Adult)  Goal: Plan of Care Review  Outcome: Ongoing (interventions implemented as appropriate)    06/22/17 0332   Coping/Psychosocial Response Interventions   Plan Of Care Reviewed With patient   Patient Care Overview   Progress no change   Outcome Evaluation   Outcome Summary/Follow up Plan Patient remains confused and lethargic tonight. Patient remains on tube feeding and is tollerating it well with very little residual and no abdominal distension on vomitting. Patient continues to be turned Q2 hours. Patient's NIHSS was a 10 tonight. Continue to monitor for neurological decline closely. Continue with current plan of care

## 2017-06-22 NOTE — PLAN OF CARE
"Problem: Patient Care Overview (Adult)  Goal: Plan of Care Review    06/22/17 0845   Coping/Psychosocial Response Interventions   Plan Of Care Reviewed With patient   Patient Care Overview   Progress no change   Outcome Evaluation   Outcome Summary/Follow up Plan Pt nonverbal this AM, excluding one instance where he said, \"oh shit\" . Poor participation. No overt s/s of asp with ice or thins via spoon. SLP recs free water protocol with ice and thins via spoon.            "

## 2017-06-22 NOTE — THERAPY EVALUATION
Acute Care - Physical Therapy Initial Evaluation  [facility]     Patient Name: Rafita Davis  : 1960  MRN: [id number]  Today's Date: 2017   Onset of Illness/Injury or Date of Surgery Date: (P) 17     Referring Physician: Perry (P)      Admit Date: 2017     Visit Dx:    ICD-10-CM ICD-9-CM   1. Decreased mobility R26.89 781.99     Patient Active Problem List   Diagnosis   • Subarachnoid bleed   • Subarachnoid hemorrhage   • Somnolence   • Oropharyngeal dysphagia   • Seizure   • Protein-calorie malnutrition, moderate     Past Medical History:   Diagnosis Date   • Injury of back    • Kidney stones    • Stroke      Past Surgical History:   Procedure Laterality Date   • BACK SURGERY     • BRAIN SURGERY     • KIDNEY STONE SURGERY     •  SHUNT INSERTION            PT ASSESSMENT (last 72 hours)      PT Evaluation       17 1155 17 1103    Rehab Evaluation    Document Type (P)  evaluation  -ALEXANDER     Subjective Information (P)  agree to therapy  -     Patient Effort, Rehab Treatment (P)  good  -ALEXANDER     General Information    Patient Profile Review (P)  yes  -ALEXANDER     Onset of Illness/Injury or Date of Surgery Date (P)  17  -ALEXANDER     Referring Physician (P)  [place]  -ALEXANDER     General Observations (P)  In bed  -     Pertinent History Of Current Problem (P)  Tranferred from [facility] after period of unresponsiveness.  Pt has had a subarachnoid hemorrhage  -     Precautions/Limitations (P)  fall precautions   HOB elevated 30-45  -ALEXANDER     Prior Level of Function (P)  --   Pt unable to report hx  -ALEXANDER     Equipment Currently Used at Home (P)  --   Pt unable to report hx  -ALEXANDER none  -    Plans/Goals Discussed With (P)  patient  -ALEXANDER     Barriers to Rehab (P)  medically complex  -ALEXANDER     Living Environment    Lives With (P)  alone  -ALEXANDER alone  -    Living Arrangements (P)  house  -ALEXANDER house  -    Home Accessibility (P)  stairs to enter home  -ALEXANDER stairs to enter home  -    Number of  Stairs to Enter Home (P)  3  -ALEXANDER 3  -JM    Stair Railings at Home (P)  none  -ALEXANDER none  -    Type of Financial/Environmental Concern  none  -JM    Clinical Impression    Patient/Family Goals Statement (P)  Pt unable to report due to confusion  -     Criteria for Skilled Therapeutic Interventions Met (P)  yes  -ALEXANDER     Pathology/Pathophysiology Noted (Describe Specifically for Each System) (P)  musculoskeletal;neuromuscular  -ALEXANDER     Impairments Found (describe specific impairments) (P)  arousal, attention, and cognition;ergonomics and body mechanics;gait, locomotion, and balance;motor function;muscle performance  -ALEXANDER     Rehab Potential (P)  fair, will monitor progress closely  -ALEXANDER     Vital Signs    Pre Patient Position (P)  Supine  -ALEXANDER     Intra Patient Position (P)  Sitting  -ALEXANDER     Post Patient Position (P)  Supine  -ALEXANDER     Pain Assessment    Pain Assessment (P)  No/denies pain  -ALEXANDER     Vision Assessment/Intervention    Visual Impairment (P)  WFL  -     Cognitive Assessment/Intervention    Current Cognitive/Communication Assessment (P)  impaired  -     Orientation Status (P)  oriented to;person;disoriented to;place;time;situation  -ALEXANDER     Follows Commands/Answers Questions (P)  25% of the time;able to follow single-step instructions;needs cueing;needs increased time;needs repetition  -     Personal Safety (P)  severe impairment;decreased awareness, need for assist;decreased awareness, need for safety;decreased insight to deficits  -     Personal Safety Interventions (P)  fall prevention program maintained;gait belt;nonskid shoes/slippers when out of bed;supervised activity  -     ROM (Range of Motion)    General ROM (P)  no range of motion deficits identified  -     MMT (Manual Muscle Testing)    General MMT Assessment (P)  upper extremity strength deficits identified;lower extremity strength deficits identified  -     General MMT Assessment Detail (P)  Strength grossly 3+/5 in major muscle groups  of BLEs and BUEs  -     Bed Mobility, Assessment/Treatment    Bed Mobility, Assistive Device (P)  bed rails;head of bed elevated  -     Bed Mob, Supine to Sit, [place] (P)  verbal cues required;nonverbal cues required (demo/gesture);minimum assist (75% patient effort);contact guard assist;2 person assist required  -     Bed Mob, Sit to Supine, [place] (P)  minimum assist (75% patient effort);2 person assist required;verbal cues required;nonverbal cues required (demo/gesture)  -     Bed Mobility, Safety Issues (P)  cognitive deficits limit understanding;decreased use of arms for pushing/pulling;decreased use of legs for bridging/pushing;impaired trunk control for bed mobility  -     Bed Mobility, Impairments (P)  strength decreased;impaired balance;coordination impaired  -     Transfer Assessment/Treatment    Transfers, Sit-Stand [place] (P)  moderate assist (50% patient effort);2 person assist required;verbal cues required;nonverbal cues required (demo/gesture)  -     Transfers, Stand-Sit [place] (P)  moderate assist (50% patient effort);2 person assist required;verbal cues required;nonverbal cues required (demo/gesture)  -     Transfers, Sit-Stand-Sit, Assist Device (P)  --   HHA  -     Transfer, Safety Issues (P)  knees buckling  -     Transfer, Comment (P)  Requires blocking ob noth knee to prevent buckling  -     Gait Assessment/Treatment    Gait, [place] Level (P)  not appropriate to assess;unable to perform  -     Motor Skills/Interventions    Additional Documentation (P)  Balance Skills Training (Group)  -     Balance Skills Training    Sitting-Level of Assistance (P)  Contact guard  -     Sitting-Balance Support (P)  Right upper extremity supported;Left upper extremity supported;Feet supported  -     Sitting-Balance Activities (P)  Reaching for objects;Reaching across midline;Right UE Weight Bearing;Left UE Weight Bearing;Trunk control activities;Lateral  "lean;Forward lean  -ALEXANDER     Sitting # of Minutes (P)  4  -ALEXANDER     Standing-Level of Assistance (P)  Minimum assistance;x2  -ALEXANDER     Static Standing Balance Support (P)  Right upper extremity supported   HHA  -ALEXANDER     Therapy Exercises    Bilateral Lower Extremities (P)  --   Pt has difficulty following commands for exercise  -ALEXANDER     Positioning and Restraints    Pre-Treatment Position (P)  in bed  -ALEXANDER     Post Treatment Position (P)  bed  -ALEXANDER     In Bed (P)  fowlers;call light within reach;encouraged to call for assist;exit alarm on;side rails up x3;SCD pump applied   HOB 35 degrees  -ALEXANDER       06/22/17 1045 06/22/17 0843    Rehab Evaluation    Document Type  evaluation  -SA    Evaluation Not Performed patient/family declined evaluation   Pt states no to OT this morning. When asked if could return later. Pt states \"sure can\" 1041  -SG     Symptoms Noted During/After Treatment  none  -SA    Pain Assessment    Pain Assessment  Unable to assess  -SA      06/21/17 1746 06/21/17 1600    General Information    Equipment Currently Used at Home  none  -AB    Living Environment    Lives With  alone  -AB    Living Arrangements  house  -AB    Home Accessibility  stairs to enter home  -AB    Transportation Available family or friend will provide  -AB       06/21/17 1328 06/20/17 1800    Rehab Evaluation    Evaluation Not Performed unable to evaluate, medical status change   Pt obtunded this AM, improving this PM per RN. Pt was on puree and thins at NH, but also with PEG. After discussing patient care with RN, SLP and RN agree bedside swallow is more appropriate next date.   -SA     General Information    Equipment Currently Used at Home  none  -KCA      User Key  (r) = Recorded By, (t) = Taken By, (c) = Cosigned By    Initials Name Provider Type    SG Susie Palma, OTR Occupational Therapist    ABIEL Johnson, RN Registered Nurse    SA Emiliana Jordan Speech and Language Pathologist    DALY Youngblood LCSW Social " Worker    AB Alexandra Girard RN Registered Nurse    ALEXANDER Cecilio Vincent, PT Student PT Student          Physical Therapy Education     Title: PT OT SLP Therapies (Active)     Topic: Physical Therapy (Active)     Point: Mobility training (Done)    Learning Progress Summary    Learner Readiness Method Response Comment Documented by Status   Patient Acceptance E VU,NL,NR   17 1208 Done               Point: Body mechanics (Done)    Learning Progress Summary    Learner Readiness Method Response Comment Documented by Status   Patient Acceptance E VU,NL,NR  ALEXANDER 17 1208 Done               Point: Precautions (Done)    Learning Progress Summary    Learner Readiness Method Response Comment Documented by Status   Patient Acceptance E VU,NL,NR   17 1208 Done                      User Key     Initials Effective Dates Name Provider Type Discipline     17 -  Cecilio Vincent, PT Student PT Student PT                PT Recommendation and Plan  Anticipated Discharge Disposition: (P) inpatient rehabilitation facility, skilled nursing facility (IRF or SNF depending on progress)  Planned Therapy Interventions: (P) balance training, bed mobility training, gait training, home exercise program, motor coordination training, patient/family education, stair training, strengthening  PT Frequency: (P) daily  Plan of Care Review  Plan Of Care Reviewed With: (P) patient  Outcome Summary/Follow up Plan: (P) Pt transferred from [facility] rehab. Pt had subarachnoid hemorrhage and was found unresponsive in rehab. Pt very confused and only oriented to name and . Pt agreeable to therapy but lethargic. Pt unable to provide an accurate hx. Pt displays decreased strength, balance, coordination, and functional mobility. Pt will benefit from skilled PT to address these deficits.          IP PT Goals       17 1209          Bed Mobility PT LTG    Bed Mobility PT LTG, Date Established (P)  17  -      Bed Mobility PT LTG, Time  to Achieve (P)  1 wk  -ALEXANDER      Bed Mobility PT LTG, Activity Type (P)  all bed mobility  -ALEXANDER      Bed Mobility PT LTG, [place] Level (P)  supervision required  -ALEXANDER      Bed Mobility PT Goal  LTG, Assist Device (P)  bed rails  -ALEXANDER      Transfer Training PT LTG    Transfer Training PT LTG, Date Established (P)  06/22/17  -ALEXANDER      Transfer Training PT LTG, Time to Achieve (P)  1 wk  -ALEXANDER      Transfer Training PT LTG, Activity Type (P)  all transfers  -ALEXANDER      Transfer Training PT LTG, [place] Level (P)  contact guard assist  -ALEXANDER      Transfer Training PT LTG, Assist Device (P)  --   appropriate AD  -ALEXANDER      Gait Training PT LTG    Gait Training Goal PT LTG, Date Established (P)  06/22/17  -ALEXANDER      Gait Training Goal PT LTG, Time to Achieve (P)  1 wk  -ALEXANDER      Gait Training Goal PT LTG, [place] Level (P)  minimum assist (75% patient effort)  -ALEXANDER      Gait Training Goal PT LTG, Assist Device (P)  --   appropriate AD  -ALEXANDER      Gait Training Goal PT LTG, Distance to Achieve (P)  50  -ALEXANDER        User Key  (r) = Recorded By, (t) = Taken By, (c) = Cosigned By    Initials Name Provider Type    ALEXANDER Vincent, PT Student PT Student                Outcome Measures       06/22/17 1200          How much help from another person do you currently need...    Turning from your back to your side while in flat bed without using bedrails? (P)  2  -ALEXANDER      Moving from lying on back to sitting on the side of a flat bed without bedrails? (P)  2  -ALEXANDER      Moving to and from a bed to a chair (including a wheelchair)? (P)  1  -ALEXANDER      Standing up from a chair using your arms (e.g., wheelchair, bedside chair)? (P)  2  -ALEXANDER      Climbing 3-5 steps with a railing? (P)  1  -ALEXANDER      To walk in hospital room? (P)  1  -ALEXANDER      AM-PAC 6 Clicks Score (P)  9  -ALEXANDER      Functional Assessment    Outcome Measure Options (P)  AM-PAC 6 Clicks Basic Mobility (PT)  -ALEXANDER        User Key  (r) = Recorded By, (t) = Taken By, (c) = Cosigned By     Initials Name Provider Type    ALEXANDER Ceciliojoe Vincent, PT Student PT Student           Time Calculation:         PT Charges       06/22/17 1153          Time Calculation    Start Time (P)  1125  -ALEXANDER      Stop Time (P)  1151  -ALEXANDER      Time Calculation (min) (P)  26 min  -ALEXANDER      PT Received On (P)  06/22/17  -ALEXANDER      PT - Next Appointment (P)  06/23/17  -ALEXANDER      PT Goal Re-Cert Due Date (P)  06/29/17  -ALEXANDER        User Key  (r) = Recorded By, (t) = Taken By, (c) = Cosigned By    Initials Name Provider Type    ALEXANDER Cecilio Carlton, PT Student PT Student          Therapy Charges for Today     Code Description Service Date Service Provider Modifiers Qty    [id number] HC PT EVAL MOD COMPLEXITY 2 6/22/2017 Cecilio Vincent, PT Student GP 1    [id number] HC PT THER SUPP EA 15 MIN 6/22/2017 Cecilio Vincent, PT Student GP 1          PT G-Codes  Outcome Measure Options: (P) AM-PAC 6 Clicks Basic Mobility (PT)      Cecilio Vincent, PT Student  6/22/2017

## 2017-06-22 NOTE — PROGRESS NOTES
PPS CMG Coordinator  Inpatient Rehabilitation Admission    Ethnic Group: White.  Marital Status:  Marital Status: .    IRF Admission Date:  06/20/2017  Admission Class: Unplanned Discharge.  Admit From:  [facility]    Pre-Hospital Living: Home. Pre-Hospital Living  With: (1) Alone.    Payment Sources: Primary: Not Listed.  Secondary: Not Listed.  Impairment Group: 02.1 Non-traumatic  Date of Onset of Impairment: 06/03/2017    Etiologic Diagnosis Code(s):  Rank Code      Description  1    I60.9     Nontraumatic subarachnoid hemorrhage,                 unspecified    Comorbidities:      Are there any arthritis conditions recorded for Impairment Group, Etiologic  Diagnosis, or Comorbid Conditions that meet all of the regulatory requirements  for IRF classification (in 42 .29(b)(2)(x), (xi), and xii))? No    MATILDE Bladder Accidents:  0 - Accidents.  Bladder Score = 3.  Three (3) bladder accidents.  MATILDE Bowel Accident: 0 -Accidents.  Bowel Score = 7. Patient has no accidents.    Presence of Pressure Ulcer:  No observed/documented pressure ulcers.    MEDICAL NEEDS  Height on Admission:  70 inches.  Weight on Admission:  117 pounds.    QUALITY INDICATORS  Prior Functioning:  Self Care: Patient completed the activities by him/herself, with or without an  assistive device, with no assistance from a helper.  Indoor Mobility: Patient completed the activities by him/herself, with or  without an assistive device, with no assistance from a helper.  Stairs: Patient completed the activities by him/herself, with or without an  assistive device, with no assistance from a helper.  Functional Cognition: Patient completed the activities by him/herself, with or  without an assistive device, with no assistance from a helper.  Prior Device Use: Patient does not use manual or motorized wheelchair or  scooter, mechanical lift, walker, or an orthotic/prosthesis.    Bladder and Bowel: Bladder Continence:  Incontinent daily.  Bowel Continence: Not rated (patient had an ostomy or did not have a bowel  movement for the entire 3 days).  Swallowing/Nutritional Status: Modiified food consistency/supervision (patient  requires modified food or liquid consistency and/or needs supervision during  eating for safety).  Special Conditions: Patient did not receive total parenteral nutrition treatment  at the time of admission.    Signed by: Taqueria Gaffney RN

## 2017-06-22 NOTE — SIGNIFICANT NOTE
"   06/22/17 1045   Rehab Treatment   Discipline occupational therapist   Rehab Evaluation   Evaluation Not Performed patient/family declined evaluation  (Pt states no to OT this morning. When asked if could return later. Pt states \"sure can\" 1041)     "

## 2017-06-22 NOTE — PROGRESS NOTES
QUALITY INDICATORS  Active Diagnosis: Comorbidities and Co-existing Conditions:   Patient does not  have PAD, PVD, or Diabetes Mellitus  Health Conditions: Patient has had two or more falls, or a fall with injury, in  the past year. Patient has had major surgery during the 100 days prior to  admission.  Skin Conditions: Unhealed Pressure Ulcer(s) at Stage 1 or Higher on Admission:  No.    Signed by: Taqueria Gaffney RN

## 2017-06-22 NOTE — NURSING NOTE
Pt transferred from our acute rehab program. 6/21 after change in medical status. Plans are for pt to return once any further work up complete and have insurance precert. Will complete precert once have updated therapy notes. Thanks, Danette PAREDES rehab admission nurse [phone]

## 2017-06-22 NOTE — SIGNIFICANT NOTE
06/22/17 1404   Rehab Treatment   Discipline occupational therapist   Rehab Evaluation   Evaluation Not Performed other (see comments)  (attempted again this afternoon. Pt opens eyes but does  maintain alertness for active particpation with OT. Discussed  with nurse [phone])

## 2017-06-22 NOTE — PLAN OF CARE
Problem: Patient Care Overview (Adult)  Goal: Plan of Care Review    17 1209   Coping/Psychosocial Response Interventions   Plan Of Care Reviewed With patient   Outcome Evaluation   Outcome Summary/Follow up Plan [facility] Pt had subarachnoid hemorrhage and was found unresponsive in rehab. Pt very confused and only oriented to name and . Pt agreeable to therapy but lethargic. Pt unable to provide an accurate hx. Pt displays decreased strength, balance, coordination, and functional mobility. Pt will benefit from skilled PT to address these deficits.         Problem: Inpatient Physical Therapy  Goal: Bed Mobility Goal LTG- PT    17 1209   Bed Mobility PT LTG   Bed Mobility PT LTG, Date Established 17   Bed Mobility PT LTG, Time to Achieve 1 wk   Bed Mobility PT LTG, Activity Type all bed mobility   Bed Mobility PT LTG, [place] Level supervision required   Bed Mobility PT Goal LTG, Assist Device bed rails       Goal: Transfer Training Goal 1 LTG- PT    17 1209   Transfer Training PT LTG   Transfer Training PT LTG, Date Established 17   Transfer Training PT LTG, Time to Achieve 1 wk   Transfer Training PT LTG, Activity Type all transfers   Transfer Training PT LTG, [place] Level contact guard assist   Transfer Training PT LTG, Assist Device (appropriate AD)       Goal: Gait Training Goal LTG- PT    17 1209   Gait Training PT LTG   Gait Training Goal PT LTG, Date Established 17   Gait Training Goal PT LTG, Time to Achieve 1 wk   Gait Training Goal PT LTG, [place] Level minimum assist (75% patient effort)   Gait Training Goal PT LTG, Assist Device (appropriate AD)   Gait Training Goal PT LTG, Distance to Achieve 50

## 2017-06-22 NOTE — PLAN OF CARE
Problem: Patient Care Overview (Adult)  Goal: Plan of Care Review    17 1330   Coping/Psychosocial Response Interventions   Plan Of Care Reviewed With patient   Outcome Evaluation   Outcome Summary/Follow up Plan Pt transferred from [facility] rehab. Pt had subarachnoid hemorrhage and was found unresponsive in rehab. Pt very confused and only oriented to name and . Pt agreeable to therapy but lethargic. Pt unable to provide an accurate hx. Pt displays decreased strength, balance, coordination, and functional mobility. Pt will benefit from skilled PT to address these deficits.         Problem: Inpatient Physical Therapy  Goal: Bed Mobility Goal LTG- PT    17 1209   Bed Mobility PT LTG   Bed Mobility PT LTG, Date Established 17   Bed Mobility PT LTG, Time to Achieve 1 wk   Bed Mobility PT LTG, Activity Type all bed mobility   Bed Mobility PT LTG, [place] Level supervision required   Bed Mobility PT Goal LTG, Assist Device bed rails       Goal: Transfer Training Goal 1 LTG- PT    17 1209   Transfer Training PT LTG   Transfer Training PT LTG, Date Established 17   Transfer Training PT LTG, Time to Achieve 1 wk   Transfer Training PT LTG, Activity Type all transfers   Transfer Training PT LTG, [place] Level contact guard assist   Transfer Training PT LTG, Assist Device (appropriate AD)       Goal: Gait Training Goal LTG- PT    17 1209   Gait Training PT LTG   Gait Training Goal PT LTG, Date Established 17   Gait Training Goal PT LTG, Time to Achieve 1 wk   Gait Training Goal PT LTG, [place] Level minimum assist (75% patient effort)   Gait Training Goal PT LTG, Assist Device (appropriate AD)   Gait Training Goal PT LTG, Distance to Achieve 50

## 2017-06-22 NOTE — PROGRESS NOTES
Case Management  Inpatient Rehabilitation Plan of Care and Discharge Plan Note    Rehabilitation Diagnosis:  SAH/IVH  Date of Onset:  6/3/17    Medical Summary:  Transfer from [facility] with AMS; found  unresonsive in home by daughter; last known contact 3 days PTA; recurrent  basilar aneurysm - SAH/IVH; failure to thrive; hydrocephalus; cardiac cath  planced at [facility] and replaced with #18 on 6/10/17; ex spouse reports  decreased appetite in recent months; 6/17/17NIM from 19 to 14 - stat CT read  with new bleed right parietal blobe  Past Medical History: Kidney stones; stool incont PTA; basilar artery aneurysm  treatment >20 years ago; back surgery x2    Plan of Care  Updated Problems/Interventions      Expected Intensity:  Average of 3 hours of therapy 5 days/week.  Interdisciplinary Team:  Interdisciplinary Team: Medical Supervision and 24 Hour Rehabilitation Nursing.,  Physical Therapy:, Occupational Therapy:, Speech and Language Therapy:, Social  Work, Therapeutic Recreation., Psychology.  Physical Therapy Intensity/Duration: 60 minutes/day, 5 days/week for 24 days  Occupational Therapy Intensity/Duration: 60 minutes/day, 5 days/week for 24 days    Speech Language Pathology  Intensity/Duration: 60 minutes/day, 5 days/week for  24 days  Estimated Length of Stay/Anticipated Discharge Date: ELOS: 3 - 4 weeks  Anticipated Discharge Destination:  Anticipated discharge destination from inpatient rehabilitation is community  discharge with assistance. To home of ex-spouse until daughter Esthela returns  to [place] - per ex-spouse Nisreen - will be able to stay on 1st floor      Based on the patient's medical and functional status, their prognosis and  expected level of functional improvement is:  MIN/CGA with HH vs outpatient    Signed by: Taqueria Gaffney RN

## 2017-06-22 NOTE — PROGRESS NOTES
"     LOS: 1 day   Primary Care Physician: Jaz Devlin, DO     Subjective   Awake but confused. Can't tell me where he is. folet placed at admit yest. Reportedly requires in/out cath at rehab    Vital Signs  Body mass index is 18.52 kg/(m^2).  Temp:  [98.1 °F (36.7 °C)-98.9 °F (37.2 °C)] 98.7 °F (37.1 °C)  Heart Rate:  [67-85] 69  Resp:  [16-18] 16  BP: (117-124)/(79-82) 117/81      Objective:  General Appearance:  In no acute distress (chronically ill appearing. older than age).    Vital signs: (most recent): Blood pressure 117/81, pulse 69, temperature 98.7 °F (37.1 °C), temperature source Oral, resp. rate 16, height 70\" (177.8 cm), weight 129 lb 1.6 oz (58.6 kg), SpO2 97 %.    Lungs:  There are decreased breath sounds.  No wheezes or rales.    Heart: Normal rate.  Regular rhythm.  No murmur.   Abdomen: Abdomen is soft and non-distended.  Bowel sounds are normal.   There is no abdominal tenderness.   There is no splenomegaly. There is no hepatomegaly.   Extremities: There is no dependent edema.    Neurological: (Confused and slow to answer questions. Oriented to self only).          Results Review:    I reviewed the patient's new clinical results.      Results from last 7 days  Lab Units 06/22/17  0517 06/21/17  0830   WBC 10*3/mm3 7.64 7.61   HEMOGLOBIN g/dL 12.2* 12.3*   PLATELETS 10*3/mm3 348 357       Results from last 7 days  Lab Units 06/22/17  0517 06/21/17  0830   SODIUM mmol/L 141 143   POTASSIUM mmol/L 3.5 3.6   CHLORIDE mmol/L 103 105   TOTAL CO2 mmol/L 28.0 28.4   BUN mg/dL 21* 20   CREATININE mg/dL 0.64* 0.68*   CALCIUM mg/dL 9.4 9.3   GLUCOSE mg/dL 116* 128*         Hemoglobin A1C:No results found for: HGBA1C    Glucose Range:  Glucose   Date/Time Value Ref Range Status   06/21/2017 0806 125 70 - 130 mg/dL Final       Lab Results   Component Value Date    [id number] 946 06/22/2017       No results found for: TSH    Assessment & Plan      Medication Review: Yes    Active Hospital Problems (** " Indicates Principal Problem)    Diagnosis Date Noted   • **Seizure [R56.9] 06/21/2017   • Subarachnoid hemorrhage [I60.9] 06/21/2017   • Somnolence [R40.0] 06/21/2017   • Oropharyngeal dysphagia [R13.12] 06/21/2017   • Protein-calorie malnutrition, moderate [E44.0] 06/21/2017      Resolved Hospital Problems    Diagnosis Date Noted Date Resolved   No resolved problems to display.       Assessment/Plan  1. Sz d/o. EEG abn. Dilantin started. Await further neuro input  2. Urinary retention- has yanes but RN says policy now is that urology has to be consulted (?)  3. SAH, PEG for dysphagia  4. Hypok. One dose K ordered.     Lashawn Perry MD  06/22/17  3:23 PM

## 2017-06-22 NOTE — PLAN OF CARE
Problem: Nutrition, Enteral (Adult)  Goal: Signs and Symptoms of Listed Potential Problems Will be Absent or Manageable (Nutrition, Enteral)  Outcome: Ongoing (interventions implemented as appropriate)    06/22/17 0332   Nutrition, Enteral   Problems Assessed (Enteral Nutrition) all   Problems Present (Enteral Nutrition) none         Problem: Stroke (Hemorrhagic) (Adult)  Goal: Signs and Symptoms of Listed Potential Problems Will be Absent or Manageable (Stroke)  Outcome: Ongoing (interventions implemented as appropriate)    06/22/17 0332   Stroke (Hemorrhagic)   Problems Assessed (Stroke (Hemorrhagic)) all   Problems Present (Stroke (Hemorrhagic)) communication impairment;cognitive impairment;situational response         Problem: Fall Risk (Adult)  Goal: Absence of Falls  Outcome: Ongoing (interventions implemented as appropriate)    06/22/17 1458   Fall Risk (Adult)   Absence of Falls making progress toward outcome         Problem: Urine Elimination, Impaired (Adult)  Goal: Effective Urinary Elimination  Outcome: Ongoing (interventions implemented as appropriate)    06/22/17 1458   Urine Elimination, Impaired (Adult)   Effective Urinary Elimination making progress toward outcome       Goal: Effective Containment of Urine  Outcome: Ongoing (interventions implemented as appropriate)    06/22/17 1458   Urine Elimination, Impaired (Adult)   Effective Containment of Urine making progress toward outcome       Goal: Reduced Incontinence Episodes  Outcome: Ongoing (interventions implemented as appropriate)    06/22/17 1458   Urine Elimination, Impaired (Adult)   Reduced Incontinence Episodes making progress toward outcome         Problem: Patient Care Overview (Adult)  Goal: Plan of Care Review  Outcome: Ongoing (interventions implemented as appropriate)    06/22/17 0845 06/22/17 1448 06/22/17 1458   Coping/Psychosocial Response Interventions   Plan Of Care Reviewed With --  patient --    Patient Care Overview   Progress  no change --  --    Outcome Evaluation   Outcome Summary/Follow up Plan --  --  Planning on patient to be transferred back to [facility] rehab. Recert started today       Goal: Adult Individualization and Mutuality  Outcome: Ongoing (interventions implemented as appropriate)

## 2017-06-23 PROCEDURE — 99232 SBSQ HOSP IP/OBS MODERATE 35: CPT | Performed by: NURSE PRACTITIONER

## 2017-06-23 PROCEDURE — 97110 THERAPEUTIC EXERCISES: CPT

## 2017-06-23 PROCEDURE — 92526 ORAL FUNCTION THERAPY: CPT

## 2017-06-23 PROCEDURE — 80186 ASSAY OF PHENYTOIN FREE: CPT | Performed by: NURSE PRACTITIONER

## 2017-06-23 PROCEDURE — 0T9B70Z DRAINAGE OF BLADDER WITH DRAINAGE DEVICE, VIA NATURAL OR ARTIFICIAL OPENING: ICD-10-PCS | Performed by: UROLOGY

## 2017-06-23 PROCEDURE — 97165 OT EVAL LOW COMPLEX 30 MIN: CPT

## 2017-06-23 PROCEDURE — 80185 ASSAY OF PHENYTOIN TOTAL: CPT | Performed by: NURSE PRACTITIONER

## 2017-06-23 RX ORDER — TAMSULOSIN HYDROCHLORIDE 0.4 MG/1
0.4 CAPSULE ORAL DAILY
Status: DISCONTINUED | OUTPATIENT
Start: 2017-06-23 | End: 2017-06-24 | Stop reason: CLARIF

## 2017-06-23 RX ORDER — PHENYTOIN 125 MG/5ML
100 SUSPENSION ORAL 3 TIMES DAILY
Status: CANCELLED | OUTPATIENT
Start: 2017-06-23

## 2017-06-23 RX ORDER — PHENYTOIN 125 MG/5ML
300 SUSPENSION ORAL DAILY
Status: DISCONTINUED | OUTPATIENT
Start: 2017-06-24 | End: 2017-06-26 | Stop reason: HOSPADM

## 2017-06-23 RX ORDER — ASPIRIN 325 MG
325 TABLET ORAL DAILY
Status: CANCELLED | OUTPATIENT
Start: 2017-06-24

## 2017-06-23 RX ORDER — HYDROCODONE BITARTRATE AND ACETAMINOPHEN 5; 325 MG/1; MG/1
1 TABLET ORAL EVERY 6 HOURS PRN
Status: CANCELLED | OUTPATIENT
Start: 2017-06-23 | End: 2017-06-30

## 2017-06-23 RX ORDER — CLOPIDOGREL BISULFATE 75 MG/1
75 TABLET ORAL DAILY
Status: CANCELLED | OUTPATIENT
Start: 2017-06-24

## 2017-06-23 RX ADMIN — CLOPIDOGREL 75 MG: 75 TABLET, FILM COATED ORAL at 09:45

## 2017-06-23 RX ADMIN — TAMSULOSIN HYDROCHLORIDE 0.4 MG: 0.4 CAPSULE ORAL at 17:58

## 2017-06-23 RX ADMIN — PHENYTOIN 100 MG: 125 SUSPENSION ORAL at 09:45

## 2017-06-23 RX ADMIN — ASPIRIN 325 MG: 325 TABLET ORAL at 09:45

## 2017-06-23 NOTE — NURSING NOTE
Have initiated precert. Will update once have a determination. ThanksDanette RN rehab admission nurse [phone]

## 2017-06-23 NOTE — THERAPY TREATMENT NOTE
Acute Care - Physical Therapy Treatment Note  [facility]     Patient Name: Rafita Davis  : 1960  MRN: [id number]  Today's Date: 2017  Onset of Illness/Injury or Date of Surgery Date: 17     Referring Physician: Lester    Admit Date: 2017    Visit Dx:    ICD-10-CM ICD-9-CM   1. Decreased mobility R26.89 781.99     Patient Active Problem List   Diagnosis   • Subarachnoid bleed   • Subarachnoid hemorrhage   • Somnolence   • Oropharyngeal dysphagia   • Seizure   • Protein-calorie malnutrition, moderate               Adult Rehabilitation Note       17 1400          Rehab Assessment/Intervention    Discipline (P)  --   PT Student  -ALEXANDER      Document Type (P)  therapy note (daily note)  -ALEXANDER      Subjective Information (P)  agree to therapy;complains of;pain  -ALEXANDER      Patient Effort, Rehab Treatment (P)  fair  -ALEXANDER      Precautions/Limitations (P)  fall precautions  -ALEXANDER      Recorded by [JEFF Vincent PT Student      Pain Assessment    Pain Assessment (P)  Unable to assess   Reports pain in legs, unable to give number  -ALEXANDER      Recorded by [ALEXANDER] Cecilio Vincent PT Student      Cognitive Assessment/Intervention    Current Cognitive/Communication Assessment (P)  impaired  -ALEXANDER      Orientation Status (P)  oriented to;person;disoriented to;place;time;situation  -ALEXANDER      Follows Commands/Answers Questions (P)  50% of the time;able to follow single-step instructions;needs cueing;needs increased time;needs repetition  -ALEXANDER      Personal Safety (P)  severe impairment;decreased awareness, need for assist;decreased awareness, need for safety;decreased insight to deficits  -ALEXANDER      Personal Safety Interventions (P)  fall prevention program maintained;muscle strengthening facilitated;nonskid shoes/slippers when out of bed;supervised activity  -ALEXANDER      Recorded by [ALEXANDER] Cecilio Vincent, PT Student      MMT (Manual Muscle Testing)    General MMT Assessment (P)  upper extremity strength deficits  identified  -ALEXANDER      General MMT Assessment Detail (P)  L biceps 4/5, L triceps 3+/5.  Difficult to assess due to confusion.  -ALEXANDER      Recorded by [JEFF Vincent, PT Student      Bed Mobility, Assessment/Treatment    Bed Mob, Supine to Sit, [place] (P)  dependent (less than 25% patient effort);2 person assist required;verbal cues required;nonverbal cues required (demo/gesture)  -ALEXANDER      Bed Mob, Sit to Supine, [place] (P)  dependent (less than 25% patient effort);2 person assist required;verbal cues required;nonverbal cues required (demo/gesture)  -ALEXANDER      Bed Mobility, Safety Issues (P)  cognitive deficits limit understanding;decreased use of arms for pushing/pulling;decreased use of legs for bridging/pushing;impaired trunk control for bed mobility  -ALEXANDER      Bed Mobility, Impairments (P)  strength decreased;impaired balance;coordination impaired  -ALEXANDER      Recorded by [JEFF Vincent, PT Student      Transfer Assessment/Treatment    Transfers, Bed-Chair [place] (P)  unable to perform  -ALEXANDER      Recorded by LAWRENCE Vincent, PT Student      Gait Assessment/Treatment    Gait, [place] Level (P)  unable to perform  -ALEXANDER      Recorded by [JEFF Vincent, PT Student      Motor Skills/Interventions    Additional Documentation (P)  Balance Skills Training (Group)  -ALEXANDER      Recorded by LAWRENCE Vincent, PT Student      Balance Skills Training    Sitting-Level of Assistance (P)  Minimum assistance  -ALEXANDER      Sitting-Balance Support (P)  Right upper extremity supported;Left upper extremity supported;Feet supported  -ALEXANDER      Sitting-Balance Activities (P)  Lateral lean;Forward lean;Reaching for objects;Reaching across midline;Right UE Weight Bearing;Left UE Weight Bearing;Trunk control activities  -ALEXANDER      Sitting # of Minutes (P)  5   Pt leans toward L side and lack orientation to midline  -ALEXANDER      Recorded by [JEFF Vincent, PT Student      Therapy Exercises    Bilateral Lower Extremities (P)   AAROM:;5 reps;sitting;hip flexion;LAQ   Pt very confused and needs cues and assist for performance  -ALEXANDER      Recorded by [ALEXANDER] Cecilio Vincent PT Student      Positioning and Restraints    Pre-Treatment Position (P)  in bed  -ALEXANDER      Post Treatment Position (P)  bed  -ALEXANDER      In Bed (P)  call light within reach;encouraged to call for assist;with other staff;with family/caregiver   aide in room changing bed  -ALEXANDER      Recorded by [ALEXANDER] Cecilio Vincent PT Student        User Key  (r) = Recorded By, (t) = Taken By, (c) = Cosigned By    Initials Name Effective Dates    ALEXANDER Vincent, PT Student 05/08/17 -                 IP PT Goals       06/22/17 1209          Bed Mobility PT LTG    Bed Mobility PT LTG, Date Established 06/22/17  -EE (r) ALEXANDER (t) EE (c)      Bed Mobility PT LTG, Time to Achieve 1 wk  -EE (r) ALEXANDER (t) EE (c)      Bed Mobility PT LTG, Activity Type all bed mobility  -EE (r) ALEXANDER (t) EE (c)      Bed Mobility PT LTG, [place] Level supervision required  -EE (r) ALEXANDER (t) EE (c)      Bed Mobility PT Goal  LTG, Assist Device bed rails  -EE (r) ALEXANDER (t) EE (c)      Transfer Training PT LTG    Transfer Training PT LTG, Date Established 06/22/17  -EE (r) ALEXANDER (t) EE (c)      Transfer Training PT LTG, Time to Achieve 1 wk  -EE (r) ALEXANDER (t) EE (c)      Transfer Training PT LTG, Activity Type all transfers  -EE (r) ALEXANDER (t) EE (c)      Transfer Training PT LTG, [place] Level contact guard assist  -EE (r) ALEXANDER (t) EE (c)      Transfer Training PT LTG, Assist Device --   appropriate AD  -EE (r) ALEXANDER (t) EE (c)      Gait Training PT LTG    Gait Training Goal PT LTG, Date Established 06/22/17  -EE (r) ALEXANDER (t) EE (c)      Gait Training Goal PT LTG, Time to Achieve 1 wk  -EE (r) ALEXANDER (t) EE (c)      Gait Training Goal PT LTG, [place] Level minimum assist (75% patient effort)  -EE (r) ALEXANDER (t) EE (c)      Gait Training Goal PT LTG, Assist Device --   appropriate AD  -EE (r) ALEXANDER (t) EE (c)      Gait Training Goal PT LTG, Distance to  Achieve 50  -EE (r) ALEXANDER (t) EE (c)        User Key  (r) = Recorded By, (t) = Taken By, (c) = Cosigned By    Initials Name Provider Type    EE Yamini Zendejas, PT Physical Therapist    ALEXANDER Vincent, PT Student PT Student          Physical Therapy Education     Title: PT OT SLP Therapies (Active)     Topic: Physical Therapy (Active)     Point: Mobility training (Done)    Learning Progress Summary    Learner Readiness Method Response Comment Documented by Status   Patient Acceptance E VU,NR   06/23/17 1434 Done    Acceptance E VU,NL,NR   06/22/17 1208 Done               Point: Body mechanics (Done)    Learning Progress Summary    Learner Readiness Method Response Comment Documented by Status   Patient Acceptance E VU,NR   06/23/17 1434 Done    Acceptance E VU,NL,NR   06/22/17 1208 Done               Point: Precautions (Done)    Learning Progress Summary    Learner Readiness Method Response Comment Documented by Status   Patient Acceptance E VU,NR   06/23/17 1434 Done    Acceptance E VU,NL,NR   06/22/17 1208 Done                      User Key     Initials Effective Dates Name Provider Type Discipline     05/08/17 -  Cecilio Vincent, PT Student PT Student PT                    PT Recommendation and Plan  Anticipated Discharge Disposition: inpatient rehabilitation facility  Planned Therapy Interventions: balance training, bed mobility training, gait training, home exercise program, motor coordination training, patient/family education, stair training, strengthening  PT Frequency: daily  Plan of Care Review  Plan Of Care Reviewed With: (P) patient  Progress: (P) unable to show any progress toward functional goals  Outcome Summary/Follow up Plan: (P) Pt still extremely disoriented today.  Sat at EOB with assistance of 2.  Pt agreeable but disoriented.  Will be DC'd back to rehab.          Outcome Measures       06/23/17 1400 06/23/17 1329 06/22/17 1200    How much help from another person do you currently need...     Turning from your back to your side while in flat bed without using bedrails? (P)  2  -ALEXANDER  2  -EE (r) ALEXANDER (t) EE (c)    Moving from lying on back to sitting on the side of a flat bed without bedrails? (P)  2  -ALEXANDER  2  -EE (r) ALEXANEDR (t) EE (c)    Moving to and from a bed to a chair (including a wheelchair)? (P)  1  -ALEXANDER  1  -EE (r) ALEXANDER (t) EE (c)    Standing up from a chair using your arms (e.g., wheelchair, bedside chair)? (P)  1  -ALEXANDER  2  -EE (r) ALEXANDER (t) EE (c)    Climbing 3-5 steps with a railing? (P)  1  -ALEXANDER  1  -EE (r) ALEXANDER (t) EE (c)    To walk in hospital room? (P)  1  -ALEXANDER  1  -EE (r) ALEXANDER (t) EE (c)    AM-PAC 6 Clicks Score (P)  8  -ALEXANDER  9  -EE (r) ALEXANDER (t)    How much help from another is currently needed...    Putting on and taking off regular lower body clothing?  1  -SG     Bathing (including washing, rinsing, and drying)  1  -SG     Toileting (which includes using toilet bed pan or urinal)  1  -SG     Putting on and taking off regular upper body clothing  1  -SG     Taking care of personal grooming (such as brushing teeth)  1  -SG     Eating meals  1  -SG     Score  6  -SG     Functional Assessment    Outcome Measure Options (P)  AM-PAC 6 Clicks Daily Activity (OT)  -ALEXANDER AM-PAC 6 Clicks Daily Activity (OT)  -SG AM-PAC 6 Clicks Basic Mobility (PT)  -EE (r) ALEXANDER (t) EE (c)      User Key  (r) = Recorded By, (t) = Taken By, (c) = Cosigned By    Initials Name Provider Type    SG Susie Palma, OTR Occupational Therapist    AL Zendejas, PT Physical Therapist    ALEXANDER Vincent, PT Student PT Student           Time Calculation:         PT Charges       06/23/17 1422 06/23/17 1118       Time Calculation    Start Time (P)  1400  -ALEXANDER      Stop Time (P)  2000  -ALEXANDER      Time Calculation (min) (P)  360 min  -ALEXANDER      PT Received On (P)  06/23/17  -ALEXANDER      PT - Next Appointment (P)  06/24/17  -ALEXANDER 06/23/17  -RW     PT Goal Re-Cert Due Date (P)  06/30/17  -ALEXANDER        User Key  (r) = Recorded By, (t) = Taken By, (c) = Cosigned By     Initials Name Provider Type    RW Lizbeth Lawrence, PTA Physical Therapy Assistant    ALEXANDER Cecilio Vincent, PT Student PT Student          Therapy Charges for Today     Code Description Service Date Service Provider Modifiers Qty    04012139445 HC PT EVAL MOD COMPLEXITY 2 6/22/2017 Cecilio Vincent, PT Student GP 1    94735145307 HC PT THER SUPP EA 15 MIN 6/22/2017 Cecilio Case, PT Student GP 1    46316563093 HC PT THER PROC EA 15 MIN 6/23/2017 Cecilio Vincent, PT Student GP 1          PT G-Codes  Outcome Measure Options: (P) AM-PAC 6 Clicks Daily Activity (OT)    Cecilio Vincent, PT Student  6/23/2017

## 2017-06-23 NOTE — PLAN OF CARE
Problem: Nutrition, Enteral (Adult)  Goal: Signs and Symptoms of Listed Potential Problems Will be Absent or Manageable (Nutrition, Enteral)  Outcome: Ongoing (interventions implemented as appropriate)    Problem: Stroke (Hemorrhagic) (Adult)  Goal: Signs and Symptoms of Listed Potential Problems Will be Absent or Manageable (Stroke)  Outcome: Ongoing (interventions implemented as appropriate)    Problem: Fall Risk (Adult)  Goal: Absence of Falls  Outcome: Ongoing (interventions implemented as appropriate)    Problem: Urine Elimination, Impaired (Adult)  Goal: Effective Urinary Elimination  Outcome: Ongoing (interventions implemented as appropriate)  Goal: Effective Containment of Urine  Outcome: Ongoing (interventions implemented as appropriate)  Goal: Reduced Incontinence Episodes  Outcome: Ongoing (interventions implemented as appropriate)    Problem: Patient Care Overview (Adult)  Goal: Plan of Care Review  Outcome: Ongoing (interventions implemented as appropriate)    06/23/17 0454   Coping/Psychosocial Response Interventions   Plan Of Care Reviewed With patient   Patient Care Overview   Progress no change   Outcome Evaluation   Outcome Summary/Follow up Plan Patient slept well tonight. Patient tollerating tube feeding well and now patient is running at goal rate of 60ml/hr. Patient's PEG tube was bleeding some this morning, pressur held to site. Gina MONTERO notified. Continue to monitor bleeding closely. Abdominal binder remains in place to protect PEG. Plan is for patient to go to rehab soon. Continue to monitor, continue with current plan of care.

## 2017-06-23 NOTE — SIGNIFICANT NOTE
06/23/17 1118   Rehab Treatment   Discipline physical therapy assistant   Treatment Not Performed other (see comments)  (Pt w/ bleeding from PEG site. General SX consulted. Discussed w/ REGGIE Mitchell and decided for PT to check back in pm.)   Recommendation   PT - Next Appointment 06/23/17

## 2017-06-23 NOTE — PLAN OF CARE
Problem: Patient Care Overview (Adult)  Goal: Plan of Care Review    06/23/17 6214   Coping/Psychosocial Response Interventions   Plan Of Care Reviewed With patient   Patient Care Overview   Progress unable to show any progress toward functional goals   Outcome Evaluation   Outcome Summary/Follow up Plan Pt still extremely disoriented today. Sat at EOB with assistance of 2. Pt agreeable but disoriented. Will be DC'd back to rehab.

## 2017-06-23 NOTE — CONSULTS
Referring Provider: vitaly bruno  Reason for Consultation: retention     Patient Care Team:  Jaz Devlin DO as PCP - General (Family Medicine)    Chief complaint wife he can't pee     Subjective .     History of present illness:  57 yowm remote hx of renal stones  Acute subarachnoid hemorrhage and clipping basilar aneurysm  Post procedural seizure and urinary retention  Wife states pt mild slow stream hesitancy pre event  Continent urine clear      Review of Systems  12 point neg ros  Pertinent neg no f/c hematuria flank pain    Past Medical History:   Diagnosis Date   • Injury of back    • Kidney stones    • Stroke      Past Surgical History:   Procedure Laterality Date   • BACK SURGERY     • BRAIN SURGERY     • KIDNEY STONE SURGERY     •  SHUNT INSERTION       History reviewed. No pertinent family history.  Social History   Substance Use Topics   • Smoking status: Current Every Day Smoker     Packs/day: 2.00   • Smokeless tobacco: None   • Alcohol use No     Prescriptions Prior to Admission   Medication Sig Dispense Refill Last Dose   • cyclobenzaprine (FLEXERIL) 10 MG tablet Take 1 tablet by mouth 3 (Three) Times a Day As Needed for Muscle Spasms. 20 tablet 0    • HYDROcodone-acetaminophen (NORCO) 5-325 MG per tablet Take 1 tablet by mouth Every 4 (Four) Hours As Needed for Moderate Pain (4-6). 20 tablet 0    • ibuprofen (ADVIL,MOTRIN) 200 MG tablet Take 800 mg by mouth Every 6 (Six) Hours As Needed for Mild Pain (1-3).   3/18/2017 at Unknown time   • MethylPREDNISolone (MEDROL) 4 MG tablet follow package directions 21 tablet 0        aspirin 325 mg Per G Tube Daily   clopidogrel 75 mg Per G Tube Daily   [START ON 6/24/2017] phenytoin 300 mg Per G Tube Daily     Allergies:   Review of patient's allergies indicates no known allergies.    Objective     Vital Signs   Temp:  [98 °F (36.7 °C)-98.9 °F (37.2 °C)] 98.4 °F (36.9 °C)  Heart Rate:  [65-76] 74  Resp:  [16] 16  BP: (119-125)/(78-84)  "119/78    Intake/Output Summary (Last 24 hours) at 06/23/17 1519  Last data filed at 06/23/17 1406   Gross per 24 hour   Intake              733 ml   Output             2000 ml   Net            -1267 ml     Flowsheet Rows         First Filed Value    Admission Height  70\" (177.8 cm) Documented at 06/21/2017 1453    Admission Weight  110 lb 4.8 oz (50 kg) Documented at 06/21/2017 1453          Physical exam   abd soft with feeding tube yanes in place urine clear  Penis scrotum testes nl  No inguinal femoral adenopathy anus perineum nl  Weak anal tone no bc reflex  Rectal vault empty prostate 20 cc nl    Results Review:   I reviewed the patient's new clinical results.  Results for orders placed or performed during the hospital encounter of 06/21/17   Basic Metabolic Panel   Result Value Ref Range    Glucose 116 (H) 65 - 99 mg/dL    BUN 21 (H) 6 - 20 mg/dL    Creatinine 0.64 (L) 0.76 - 1.27 mg/dL    Sodium 141 136 - 145 mmol/L    Potassium 3.5 3.5 - 5.2 mmol/L    Chloride 103 98 - 107 mmol/L    CO2 28.0 22.0 - 29.0 mmol/L    Calcium 9.4 8.6 - 10.5 mg/dL    eGFR Non African Amer 128 >60 mL/min/1.73    BUN/Creatinine Ratio 32.8 (H) 7.0 - 25.0    Anion Gap 10.0 mmol/L   CBC (No Diff)   Result Value Ref Range    WBC 7.64 4.50 - 10.70 10*3/mm3    RBC 3.67 (L) 4.60 - 6.00 10*6/mm3    Hemoglobin 12.2 (L) 13.7 - 17.6 g/dL    Hematocrit 36.7 (L) 40.4 - 52.2 %    .0 (H) 79.8 - 96.2 fL    MCH 33.2 (H) 27.0 - 32.7 pg    MCHC 33.2 32.6 - 36.4 g/dL    RDW 14.3 11.5 - 14.5 %    RDW-SD 52.1 37.0 - 54.0 fl    MPV 10.0 6.0 - 12.0 fL    Platelets 348 140 - 500 10*3/mm3   Folate   Result Value Ref Range    Folate 8.25 4.78 - 24.20 ng/mL   Iron Profile   Result Value Ref Range    Iron 40 (L) 59 - 158 mcg/dL    Iron Saturation 21 20 - 50 %    Transferrin 126 (L) 200 - 360 mg/dL    TIBC 188 mcg/dL   Vitamin B12   Result Value Ref Range    Vitamin B-12 946 211 - 946 pg/mL   Vitamin D 25 Hydroxy   Result Value Ref Range    25 Hydroxy, " Vitamin D 23.7 (L) 30.0 - 100.0 ng/ml       This SmartLink requires a parameter for processing. Parameters are variables which can be added to the original SmartLink name. This allows you to request specific information by giving the SmartLink precise direction.    The LASTG SmartLink accepts (as a parameter) an external procedure ID or an anatomical region. Typically, this external ID would be the CPT code.    You can also request the number of procedures to be displayed by this SmartLink. To indicate the number, type the procedure ID followed by a colon and then the number of procedures. To display all occurences for that particular procedure, type an asterisk in place of the number. To obtain only the last procedure, type the procedure ID without the colon, number, or asterisk. This SmartLink can display other procedures in the same procedure category if a parameter is set.    The SmartLink will display the last x cases of the procedure or anatomical region you entered.    Example: .LASTG[[id number]:3    This example would display results for the last three orders with an external procedure ID of [id number].  Assessment/Plan     Principal Problem:    Seizure  Active Problems:    Subarachnoid hemorrhage    Somnolence    Oropharyngeal dysphagia    Protein-calorie malnutrition, moderate    Imp  Acute urinary retention post subarachnoid bleed and seizure  No prior voiding c/o except slow stream  And remote hx renal stones  Plan flomax and voiding trial 5 days  Bladder function recovery may take weeks to months will follow      I discussed the patients findings and my recommendations with patient and family    Benedict Umana MD  06/23/17  3:20 PM

## 2017-06-23 NOTE — THERAPY RE-EVALUATION
Acute Care - Speech Language Pathology   Swallow Re-Evaluation [facility]     Patient Name: Rafita Davis  : 1960  MRN: [id number]  Today's Date: 2017  Onset of Illness/Injury or Date of Surgery Date: 17            Admit Date: 2017    SPEECH-LANGUAGE PATHOLOGY RE-EVALUATION - SWALLOW  Subjective: The patient was seen on this date for a Clinical Swallow Re-evaluation.  Patient was alert and cooperative.  Pt could not complete his thoughts & kept head turned to the left, he did wince when SLP tried to get him to turn to the midline.   Objective: Textures given included ice chips, thin liquid, nectar thick liquid and puree consistency.  Assessment: Pt w/ significant oral holding even w/ ice chips.  No overt s/s of aspiration w/ thin by cup or straw when the pt swallowed within 1-15 seconds.  However periodically he would hold the bolus from 1-3 minutes & appeared to frankly aspirate on thin liquids with hard coughing.  No overt s/s of aspiration w/ nectar thick liquids by cup, but pt continued to hold the bolus for 30 sec+.  Pt would not swallow puree until SLP provided a liquid wash, again holding bolus for >30 sec. Due to the cognitive impact on the pt's swallow, doubt he would meet nutritional needs at this time w/ PO intake alone.  His risk for aspirate is too great at this time to start on a modified diet.    SLP Findings:  Patient presents with severe oropharyngeal dysphagia,highly impacted by cognition.   Recommendations: Diet Textures: NPO, Medications should be taken  by alternate means. May have ice after oral care, under staff or family supervision and with the recommended strategies for safe swallowing.   Recommended Strategies: Oral care PRN.  Other Recommended Evaluations: Speech-Language Evaluation and Cognitive-Linguistic Evaluation when address swallow as cognition improves; will likely need a VFSS prior to PO intake.   Dysphagia therapy is recommended. Rationale: to improve  the swallow function & return to PO.    Visit Dx:     ICD-10-CM ICD-9-CM   1. Decreased mobility R26.89 781.99     Patient Active Problem List   Diagnosis   • Subarachnoid bleed   • Subarachnoid hemorrhage   • Somnolence   • Oropharyngeal dysphagia   • Seizure   • Protein-calorie malnutrition, moderate     Past Medical History:   Diagnosis Date   • Injury of back    • Kidney stones    • Stroke      Past Surgical History:   Procedure Laterality Date   • BACK SURGERY     • BRAIN SURGERY     • KIDNEY STONE SURGERY     •  SHUNT INSERTION            SWALLOW EVALUATION (last 72 hours)      Swallow Evaluation       06/23/17 1459 06/22/17 0843             Rehab Evaluation    Document Type re-evaluation  -NB evaluation  -SA       Subjective Information agree to therapy  -NB        Patient Effort, Rehab Treatment good  -NB        Symptoms Noted During/After Treatment none  -NB none  -SA       General Information    Patient Profile Review yes  -NB        Subjective Patient Observations  Nonverbal  -SA       Pertinent History Of Current Problem  dysphagia post SAH, IVH  -SA       Current Diet Limitations NPO  -NB NPO  -SA       Precautions/Limitations, Vision other (see comments)   unknown  -NB        Precautions/Limitations, Hearing WFL  -NB        Prior Level of Function- Communication limited to social greetings and responses;other (comment)   could not complete thoughts  -NB        Prior Level of Function- Swallowing alternative feeding method  -NB diet modifications- foods  -SA       Plans/Goals Discussed With patient  -NB patient  -SA       Barriers to Rehab medically complex  -NB medically complex;previous functional deficit  -SA       Clinical Impression    Patient's Goals For Discharge patient did not state  -NB patient did not state  -SA       SLP Swallowing Diagnosis severe dysphagia;oral dysfunction;pharyngeal dysfunction  -NB oral dysfunction  -SA       Rehab Potential/Prognosis, Swallowing good, to achieve  stated therapy goals  -NB good, to achieve stated therapy goals  -SA       Criteria for Skilled Therapeutic Interventions Met skilled criteria for dysphagia intervention met  -NB skilled criteria for dysphagia intervention met  -SA       FCM, Swallowing  2-->Level 2  -SA       Therapy Frequency PRN  -NB PRN  -SA       Predicted Duration Therapy Interv (days) until discharge  -NB until discharge  -SA       Expected Duration Therapy Session (min) 15-30 minutes  -NB        SLP Diet Recommendation NPO: unsafe for food/liquid intake  -NB other (see comments)   water protocol  -SA       Recommended Diagnostics reassess via clinical swallow (non-instrumental exam);VFSS (MBS);other (see comments)   re-eval & VFSS prior to starting diet  -NB reassess via clinical swallow (non-instrumental exam)  -SA       SLP Rec. for Method of Medication Administration meds via alternate route  -NB        Monitor For Signs Of Aspiration  cough;elevated WBC count;throat clearing;gurgly voice;fever  -SA       Anticipated Discharge Disposition other (see comments)   unknown  -NB inpatient rehabilitation facility  -SA       Pain Assessment    Pain Assessment No/denies pain  -NB Unable to assess  -SA       Cognitive Assessment/Intervention    Current Cognitive/Communication Assessment impaired  -NB        Orientation Status oriented to;person  -NB        Follows Commands/Answers Questions 25% of the time;needs cueing;needs increased time;needs repetition  -NB        Oral Motor Structure and Function    Oral Motor Anatomy and Physiology patient demonstrates anatomy that is WNL  -NB        Dentition Assessment present and adequate  -NB        Secretion Management WNL/WFL  -NB        Velar Elevation other (see comments)   CNA  -NB        Volitional Swallow other (see comments)   CNA  -NB        Volitional Cough other (see comments)   CNA  -NB        Oral Musculature General Assessment oral labial impairment;lingual impairment;other (see comments)    generalized weakness  -NB        Oral Motor Assessment Comment  Unable to assess, pt would not follow commands  -         User Key  (r) = Recorded By, (t) = Taken By, (c) = Cosigned By    Initials Name Effective Dates    AMEE Kern MS CCC-SLP 04/13/15 -      Emiliana Mcdonough Nikki 12/11/15 -         EDUCATION  The patient has been educated in the following areas:   Dysphagia (Swallowing Impairment) Oral Care/Hydration NPO rationale.    SLP Recommendation and Plan  SLP Swallowing Diagnosis: severe dysphagia, oral dysfunction, pharyngeal dysfunction  SLP Diet Recommendation: NPO: unsafe for food/liquid intake     SLP Rec. for Method of Medication Administration: meds via alternate route     Recommended Diagnostics: reassess via clinical swallow (non-instrumental exam), VFSS ([facility]), other (see comments) (re-eval & VFSS prior to starting diet)  Criteria for Skilled Therapeutic Interventions Met: skilled criteria for dysphagia intervention met  Anticipated Discharge Disposition: other (see comments) (unknown)  Rehab Potential/Prognosis, Swallowing: good, to achieve stated therapy goals  Therapy Frequency: PRN             Plan of Care Review  Plan Of Care Reviewed With: patient  Progress: no change  Outcome Summary/Follow up Plan: Re-eval of swallow.  Pt w/ cognition impacting swallow function.  He orally holds & aspirates w/ thin liquids.  Pt would not meet nutritonal needs w/ PO intake alone.           IP SLP Goals       06/23/17 1457          Begin to Take Some PO Safely    Begin to Take Some PO Safely- SLP, Date Established 06/23/17  -NB      Begin to Take Some PO Safely- SLP, Time to Achieve by discharge  -NB      Begin to Take Some PO Safely- SLP, Outcome goal revised  -NB        User Key  (r) = Recorded By, (t) = Taken By, (c) = Cosigned By    Initials Name Provider Type    AMEE Kern MS CCC-SLP Speech and Language Pathologist             SLP Outcome Measures (last 72 hours)      SLP Outcome  Measures       06/23/17 1459 06/22/17 0800       SLP Outcome Measures    Outcome Measure Used? Adult NOMS  -NB Adult NOMS  -SA     FCM Scores    FCM Chosen Swallowing  -NB Swallowing  -SA     Swallowing FCM Score 1  -NB 2  -SA       User Key  (r) = Recorded By, (t) = Taken By, (c) = Cosigned By    Initials Name Effective Dates    AMEE Kern MS CCC-SLP 04/13/15 -      Emiliana Jordan 12/11/15 -            Time Calculation:         Time Calculation- SLP       06/23/17 1503          Time Calculation- SLP    SLP Start Time 0800  -NB      SLP Stop Time 0900  -NB      SLP Time Calculation (min) 60 min  -NB      SLP Received On 06/23/17  -NB        User Key  (r) = Recorded By, (t) = Taken By, (c) = Cosigned By    Initials Name Provider Type    AMEE Kern MS CCC-SLP Speech and Language Pathologist          Therapy Charges for Today     Code Description Service Date Service Provider Modifiers Qty    [id number]  ST TREATMENT SWALLOW 4 6/23/2017 Nicolette Kern MS CCC-SLP GN 1               Nicolette Kern MS CCC-LESTER  6/23/2017

## 2017-06-23 NOTE — PROGRESS NOTES
LOS: 2 days   Patient Care Team:  Jaz Devlin DO as PCP - General (Family Medicine)    Chief Complaint:  Decreased level of consciousness, seizure     Subjective     Interval History:     Patient Complaints: none  Patient Denies:  H/A, pain, new seizures symptoms   History taken from: patient chart    Objective     Vital Signs  Temp:  [98 °F (36.7 °C)-98.9 °F (37.2 °C)] 98.4 °F (36.9 °C)  Heart Rate:  [65-76] 74  Resp:  [16] 16  BP: (117-125)/(78-84) 119/78      Physical Examination:  HEENT: Normocephalic, atraumatic, thin, post EVD and  shunt scare and hardware on the right   COR: RRR  Resp: Even and unlabored  Extremities: Equal pulses, non distal embolization    Neurological:   MS: drowsy, arouses, uninterested/inattentive in interacting with exam, oriented to self. Language normal. Poor fund of knowledge   CN: II-XII intact except for unequal pupils        Results Review:     I reviewed the patient's new clinical results.      Results from last 7 days  Lab Units 06/22/17  0517 06/21/17  0830   WBC 10*3/mm3 7.64 7.61   HEMOGLOBIN g/dL 12.2* 12.3*   HEMATOCRIT % 36.7* 36.4*   PLATELETS 10*3/mm3 348 357       Results from last 7 days  Lab Units 06/22/17  0517 06/21/17  0830   SODIUM mmol/L 141 143   POTASSIUM mmol/L 3.5 3.6   CHLORIDE mmol/L 103 105   TOTAL CO2 mmol/L 28.0 28.4   BUN mg/dL 21* 20   CREATININE mg/dL 0.64* 0.68*   CALCIUM mg/dL 9.4 9.3   BILIRUBIN mg/dL  --  0.5   ALK PHOS U/L  --  69   ALT (SGPT) U/L  --  32   AST (SGOT) U/L  --  24   GLUCOSE mg/dL 116* 128*     6/21/17 EEG: per Dr. Flynn Interpretation: Markedly abnormal study due to independent regions of polymorphic slowing indicative of regions of injury or destruction involving the left anterior temporal/frontal and the right mid temporal regions. The left frontal area shows phase reversal of slow waves at F7 indicative of increased risk of seizures of left anterior temporal origin.  6/21/17 CT head: stable, no new or acute  changes     Medication Review: changes made    Assessment/Plan  Mr. 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 He was transferred to [facility] rehab and became less responsive. An EEG was ordered and revealed evidence of seizure/potential for seizure. He originally presented to [facility] with seizure. Therefore, the event on 6/21 was likely seizure and he is now being treated for seizure with Dilantin. He is currently on Dilantin 100mg TID. We will change this to Dilantin 300mg daily. I will check a Dilantin level tomorrow. He can return to rehab. He should have a repeat EEG in a few months, prior to follow up with neurology, ? At [facility]. If he is not seeing Neurology at [facility] i would recommend eh FU with Dr. Hardwick or Dr. Flynn in our office in 3 months for seizure. The repeat head CT on 6/21 did not show any new or acute changes. Continue ASA and Plavix. Please call with any questions or concerns.     Plan:   - Aspirin 325mg, Plavix 75mg    - change dilantin to 300mg daily   - check dilantin level     - Neurochecks   - Non-pharmacological DVT prophylaxis   - EKG Tele   - PT/OT/ST   - Stroke Education   - Blood pressure control to <130/80   - Goal LDL <70-recommend high dose statins-    - Serum glucose < 140   - Call 911 for stroke any stroke symptoms   - repeat EEG with any new seizure like activity and in 3 months     Principal Problem:    Seizure  Active Problems:    Subarachnoid hemorrhage    Somnolence    Oropharyngeal dysphagia    Protein-calorie malnutrition, moderate      I have discussed the above with the patient.    Do Wheatley, APRN  06/23/17  10:48 AM

## 2017-06-23 NOTE — THERAPY EVALUATION
Acute Care - Occupational Therapy Initial Evaluation  [facility]     Patient Name: Rafita Davis  : 1960  MRN: [id number]  Today's Date: 2017  Onset of Illness/Injury or Date of Surgery Date: 17     Referring Physician: Lester    Admit Date: 2017       ICD-10-CM ICD-9-CM   1. Decreased mobility R26.89 781.99     Patient Active Problem List   Diagnosis   • Subarachnoid bleed   • Subarachnoid hemorrhage   • Somnolence   • Oropharyngeal dysphagia   • Seizure   • Protein-calorie malnutrition, moderate     Past Medical History:   Diagnosis Date   • Injury of back    • Kidney stones    • Stroke      Past Surgical History:   Procedure Laterality Date   • BACK SURGERY     • BRAIN SURGERY     • KIDNEY STONE SURGERY     •  SHUNT INSERTION            OT ASSESSMENT FLOWSHEET (last 72 hours)      OT Evaluation       17 1311 17 1029 17 1404 17 1155 17 1103    Rehab Evaluation    Document Type evaluation   per nurse ok to see per MD . Noted plans for rehab today  -   evaluation  -EE (r) ALEXANDER (t) EE (c)     Subjective Information agree to therapy  -SG   agree to therapy  -EE (r) ALEXANDER (t) EE (c)     Evaluation Not Performed  other (see comments)   Discussed with nurse and pt with bleeding around feeding tube site. Hold OT at this time. 855  -SG other (see comments)   attempted again this afternoon. Pt opens eyes but does  maintain alertness for active particpation with OT. Discussed  with nurse 1344  -SG      Patient Effort, Rehab Treatment fair  -SG   good  -EE (r) ALEXANDER (t) EE (c)     General Information    Patient Profile Review yes  -SG   yes  -EE (r) ALEXANDER (t) EE (c)     Onset of Illness/Injury or Date of Surgery Date    17  -EE (r) ALEXANDER (t) EE (c)     Referring Physician Lester  -ERICK Perry  -EE (r) ALEXANDER (t) EE (c)     General Observations In bed  -SG   In bed  -EE (r) ALEXANDER (t) EE (c)     Pertinent History Of Current Problem    Tranferred from [facility] rehab after  period of unresponsiveness.  Pt has had a subarachnoid hemorrhage  -EE (r) ALEXANDER (t) EE (c)     Precautions/Limitations fall precautions  -SG   fall precautions   HOB elevated 30-45  -EE (r) ALEXANDER (t) EE (c)     Prior Level of Function --   unsure of prior level of function  -SG   --   Pt unable to report hx  -EE (r) ALEXANDER (t) EE (c)     Equipment Currently Used at Home    --   Pt unable to report hx  -EE (r) ALEXANDER (t) EE (c) none  -JM    Plans/Goals Discussed With patient  -SG   patient  -EE (r) ALEXANDER (t) EE (c)     Barriers to Rehab    medically complex  -EE (r) ALEXANDER (t) EE (c)     Living Environment    Lives With    alone  -EE (r) ALEXANDER (t) EE (c) alone  -JM    Living Arrangements    house  -EE (r) ALEXANDER (t) EE (c) house  -JM    Home Accessibility    stairs to enter home  -EE (r) ALEXANDER (t) EE (c) stairs to enter home  -JM    Number of Stairs to Enter Home    3  -EE (r) ALEXANDER (t) EE (c) 3  -JM    Stair Railings at Home    none  -EE (r) ALEXANDER (t) EE (c) none  -JM    Type of Financial/Environmental Concern     none  -JM    Clinical Impression    OT Diagnosis need for assist with personal care  -SG        Patient/Family Goals Statement does not state  -        Criteria for Skilled Therapeutic Interventions Met yes;treatment indicated  -        Therapy Frequency 3-5 times/wk  -        Vital Signs    Pre Patient Position    Supine  -EE (r) ALEXANDER (t) EE (c)     Intra Patient Position    Sitting  -EE (r) ALEXANDER (t) EE (c)     Post Patient Position    Supine  -EE (r) ALEXANDER (t) EE (c)     Pain Assessment    Pain Assessment --   no indication of pain  -   No/denies pain  -EE (r) ALEXANDER (t) EE (c)     Vision Assessment/Intervention    Visual Impairment --   unable to fully assess due to confusion  -SG   WFL  -EE (r) ALEXANDER (t) EE (c)     Cognitive Assessment/Intervention    Current Cognitive/Communication Assessment    impaired  -EE (r) ALEXANDER (t) EE (c)     Orientation Status oriented to;person   pt talks to therapist but confused. States in bat  -SG   oriented  to;person;disoriented to;place;time;situation  -EE (r) ALEXANDER (t) EE (c)     Follows Commands/Answers Questions able to follow single-step instructions   interm. follows commands  -SG   25% of the time;able to follow single-step instructions;needs cueing;needs increased time;needs repetition  -EE (r) ALEXANDER (t) EE (c)     Personal Safety    severe impairment;decreased awareness, need for assist;decreased awareness, need for safety;decreased insight to deficits  -EE (r) ALEXANDER (t) EE (c)     Personal Safety Interventions    fall prevention program maintained;gait belt;nonskid shoes/slippers when out of bed;supervised activity  -EE (r) ALEXANDER (t) EE (c)     ROM (Range of Motion)    General ROM    no range of motion deficits identified  -EE (r) ALEXANDER (t) EE (c)     General ROM Detail does not follow commands for ROM of UE's. Noted to have AROM BUE's but unable to fully assess  -SG        MMT (Manual Muscle Testing)    General MMT Assessment    upper extremity strength deficits identified;lower extremity strength deficits identified  -EE (r) ALEXANDER (t) EE (c)     General MMT Assessment Detail    Strength grossly 3+/5 in major muscle groups of BLEs and BUEs  -EE (r) ALEXANDER (t) EE (c)     Bed Mobility, Assessment/Treatment    Bed Mobility, Assistive Device    bed rails;head of bed elevated  -EE (r) ALEXANDER (t) EE (c)     Bed Mob, Supine to Sit, [place]    verbal cues required;nonverbal cues required (demo/gesture);minimum assist (75% patient effort);contact guard assist;2 person assist required  -EE (r) ALEXANDER (t) EE (c)     Bed Mob, Sit to Supine, [place]    minimum assist (75% patient effort);2 person assist required;verbal cues required;nonverbal cues required (demo/gesture)  -EE (r) ALEXANDER (t) EE (c)     Bed Mobility, Safety Issues    cognitive deficits limit understanding;decreased use of arms for pushing/pulling;decreased use of legs for bridging/pushing;impaired trunk control for bed mobility  -EE (r) ALEXANDER (t) EE (c)     Bed Mobility, Impairments     strength decreased;impaired balance;coordination impaired  -EE (r) ALEXANDER (t) EE (c)     Bed Mobility, Comment unable to assess due to pt confusion  -SG        Transfer Assessment/Treatment    Transfers, Sit-Stand [place]    moderate assist (50% patient effort);2 person assist required;verbal cues required;nonverbal cues required (demo/gesture)  -EE (r) ALEXANDER (t) EE (c)     Transfers, Stand-Sit [place]    moderate assist (50% patient effort);2 person assist required;verbal cues required;nonverbal cues required (demo/gesture)  -EE (r) ALEXANDER (t) EE (c)     Transfers, Sit-Stand-Sit, Assist Device    --   HHA  -EE (r) ALEXANDER (t) EE (c)     Transfer, Safety Issues    knees buckling  -EE (r) ALEXANDER (t) EE (c)     Transfer, Comment    Requires blocking ob noth knee to prevent buckling  -EE (r) ALEXANDER (t) EE (c)     ADL Assessment/Intervention    Additional Documentation --   pt does not perform adls or follow commands for adls.  -SG        Upper Body Bathing Assessment/Training    UB Bathing Assess/Train, Comment dependent for adls at this time due to cognition  -SG        Motor Skills/Interventions    Additional Documentation    Balance Skills Training (Group)  -EE (r) ALEXANDER (t) EE (c)     Balance Skills Training    Sitting-Level of Assistance    Contact guard  -EE (r) ALEXANDER (t) EE (c)     Sitting-Balance Support    Right upper extremity supported;Left upper extremity supported;Feet supported  -EE (r) ALEXANDER (t) EE (c)     Sitting-Balance Activities    Reaching for objects;Reaching across midline;Right UE Weight Bearing;Left UE Weight Bearing;Trunk control activities;Lateral lean;Forward lean  -EE (r) ALEXANDER (t) EE (c)     Sitting # of Minutes    4  -EE (r) ALEXANDER (t) EE (c)     Standing-Level of Assistance    Minimum assistance;x2  -EE (r) ALEXANDER (t) EE (c)     Static Standing Balance Support    Right upper extremity supported   HHA  -EE (r) ALEXANDER (t) EE (c)     Therapy Exercises    Bilateral Lower Extremities    --   Pt has difficulty following commands  "for exercise  -EE (r) ALEXANDER (t) EE (c)     General Therapy Interventions    Planned Therapy Interventions ADL retraining;bed mobility training  -SG        Positioning and Restraints    Pre-Treatment Position in bed  -SG   in bed  -EE (r) ALEXANDER (t) EE (c)     Post Treatment Position bed  -SG   bed  -EE (r) ALEXANDER (t) EE (c)     In Bed call light within reach;encouraged to call for assist;exit alarm on  -SG   fowlers;call light within reach;encouraged to call for assist;exit alarm on;side rails up x3;SCD pump applied   HOB 35 degrees  -EE (r) ALEXANDER (t) EE (c)       06/22/17 1102 06/22/17 1045 06/22/17 0843 06/21/17 1746 06/21/17 1600    Rehab Evaluation    Document Type   evaluation  -SA      Evaluation Not Performed  patient/family declined evaluation   Pt states no to OT this morning. When asked if could return later. Pt states \"sure can\" 1041  -SG       Symptoms Noted During/After Treatment   none  -SA      General Information    Equipment Currently Used at Home     none  -AB    Living Environment    Lives With     alone  -AB    Living Arrangements     house  -AB    Home Accessibility     stairs to enter home  -AB    Transportation Available    family or friend will provide  -AB     Functional Level Prior    Ambulation 2-->assistive person  -JM    2-->assistive person  -AB    Transferring 0-->independent  -JM    0-->independent  -AB    Toileting 0-->independent  -JM    0-->independent  -AB    Bathing 0-->independent  -JM    0-->independent  -AB    Dressing 0-->independent  -JM    0-->independent  -AB    Eating 0-->independent  -JM    0-->independent  -AB    Communication 0-->understands/communicates without difficulty  -JM    0-->understands/communicates without difficulty  -AB    Swallowing 0-->swallows foods/liquids without difficulty  -JM    0-->swallows foods/liquids without difficulty  -AB    Prior Functional Level Comment     this was around 1st of May per ex wife  -AB    Pain Assessment    Pain Assessment   Unable to " assess  -SA        06/21/17 1328 06/20/17 1800             Rehab Evaluation    Evaluation Not Performed unable to evaluate, medical status change   Pt obtunded this AM, improving this PM per RN. Pt was on puree and thins at NH, but also with PEG. After discussing patient care with RN, SLP and RN agree bedside swallow is more appropriate next date.   -        General Information    Equipment Currently Used at Home  none  -KCA         User Key  (r) = Recorded By, (t) = Taken By, (c) = Cosigned By    Initials Name Effective Dates     Susie Palma, OTR 04/13/15 -     ABIEL Johnson, RN 06/16/16 -     EE Yamini Zendejas, PT 12/01/15 -     SA Emiliana Jordan 12/11/15 -     DALY Youngblood, LCSW 07/18/16 -     AB Alexandra Girard, RN 07/25/16 -     ALEXANDER Vincent, PT Student 05/08/17 -            Occupational Therapy Education     Title: PT OT SLP Therapies (Active)     Topic: Occupational Therapy (Active)     Point: ADL training (Active)    Description: Instruct learner(s) on proper safety adaptation and remediation techniques during self care or transfers.   Instruct in proper use of assistive devices.    Learning Progress Summary    Learner Readiness Method Response Comment Documented by Status   Patient Acceptance E NR   06/23/17 1325 Active                      User Key     Initials Effective Dates Name Provider Type Discipline     04/13/15 -  PRECIOUS Barnett Occupational Therapist OT                  OT Recommendation and Plan  Planned Therapy Interventions: ADL retraining, bed mobility training  Therapy Frequency: 3-5 times/wk             OT Goals       06/23/17 1327          Static Sitting Balance OT LTG    Static Sitting Balance OT LTG, Time to Achieve 1 wk  -SG      Static Sitting Balance OT LTG, [place] Level --   assess as appropriate to assist with adls  -SG      Follow Directions OT LTG    Follow Directions OT LTG, Time to Achieve 1 wk  -SG      Follow Directions OT LTG, Activity  Type 1-Step;50% treatment session   to assist with adls  -SG      ADL OT LTG    ADL OT LTG, Time to Achieve 1 wk  -SG      ADL OT LTG, [place] Level max assist;max verbal cues  -SG        User Key  (r) = Recorded By, (t) = Taken By, (c) = Cosigned By    Initials Name Provider Type    SG Susie Palma OTR Occupational Therapist                Outcome Measures       06/23/17 1329 06/22/17 1200       How much help from another person do you currently need...    Turning from your back to your side while in flat bed without using bedrails?  2  -EE (r) ALEXANDER (t) EE (c)     Moving from lying on back to sitting on the side of a flat bed without bedrails?  2  -EE (r) ALEXANDER (t) EE (c)     Moving to and from a bed to a chair (including a wheelchair)?  1  -EE (r) ALEXANDER (t) EE (c)     Standing up from a chair using your arms (e.g., wheelchair, bedside chair)?  2  -EE (r) ALEXANDER (t) EE (c)     Climbing 3-5 steps with a railing?  1  -EE (r) ALEXANDER (t) EE (c)     To walk in hospital room?  1  -EE (r) ALEXANDER (t) EE (c)     AM-PAC 6 Clicks Score  9  -EE (r) ALEXANDER (t)     How much help from another is currently needed...    Putting on and taking off regular lower body clothing? 1  -SG      Bathing (including washing, rinsing, and drying) 1  -SG      Toileting (which includes using toilet bed pan or urinal) 1  -SG      Putting on and taking off regular upper body clothing 1  -SG      Taking care of personal grooming (such as brushing teeth) 1  -SG      Eating meals 1  -SG      Score 6  -SG      Functional Assessment    Outcome Measure Options AM-PAC 6 Clicks Daily Activity (OT)  -SG AM-PAC 6 Clicks Basic Mobility (PT)  -EE (r) ALEXANDER (t) EE (c)       User Key  (r) = Recorded By, (t) = Taken By, (c) = Cosigned By    Initials Name Provider Type    ERICK Palma OTR Occupational Therapist    AL Zendejas, PT Physical Therapist    ALEXANDER Vincent, PT Student PT Student          Time Calculation:   OT Start Time: 1240  OT Stop Time: 1250  OT Time  Calculation (min): 10 min    Therapy Charges for Today     Code Description Service Date Service Provider Modifiers Qty    11917345116 HC OT EVAL LOW COMPLEXITY 2 6/23/2017 Susie Palma OTR GO 1               Susie Palma OTR  6/23/2017

## 2017-06-23 NOTE — PLAN OF CARE
Problem: Patient Care Overview (Adult)  Goal: Plan of Care Review    06/23/17 1457   Coping/Psychosocial Response Interventions   Plan Of Care Reviewed With patient   Patient Care Overview   Progress no change   Outcome Evaluation   Outcome Summary/Follow up Plan Re-eval of swallow. Pt w/ cognition impacting swallow function. He orally holds & aspirates w/ thin liquids. Pt would not meet nutritonal needs w/ PO intake alone.          Problem: Inpatient SLP  Goal: Dysphagia- Patient will improve swallowing skills to begin to take some PO safely    06/23/17 1457   Begin to Take Some PO Safely   Begin to Take Some PO Safely- SLP, Date Established 06/23/17   Begin to Take Some PO Safely- SLP, Time to Achieve by discharge   Begin to Take Some PO Safely- SLP, Outcome goal revised

## 2017-06-23 NOTE — SIGNIFICANT NOTE
06/23/17 1029   Rehab Treatment   Discipline occupational therapist   Rehab Evaluation   Evaluation Not Performed other (see comments)  (Discussed with nurse and pt with bleeding around feeding tube site. Hold OT at this time. 855)

## 2017-06-23 NOTE — DISCHARGE SUMMARY
ADMIT 06/20/2017  DISCHARGE 06/21/2017    SEE H&P WHICH ALSO SERVES AS DISCHARGE SUMMARY AS PATIENT SEEN AND DISCHARGED TO ACUTE CARE ON INITIAL VISIT.

## 2017-06-23 NOTE — PROGRESS NOTES
LOS: 2 days   Patient Care Team:  Jaz Devlin DO as PCP - General (Family Medicine)    Chief Complaint:   1. status post subarachnoid hemorrhage/intraventricular hemorrhage. Alba Mohamud grade 4  2. Recurrent basilar aneurysm  3.  External ventricular drain  4. June 4 cerebral angiogram with stent-assisted embolization - With the stent placement he should remain on aspirin 325 mg and Plavix 75 mg for 3 months. After 3 months, he can return to aspirin 81 mg daily.  5.  Randi 15 ventricular peritoneal shunt Certas Plus Valve - setting of 4.   6. June 17-new bleed right parietal lobe small amount at the entrance site of the ventricular peritoneal shunt catheter into the brain parenchyma  7.  June 19 PEG tube placement  8. Failure to thrive.  9. Urinary retention - Shanks catheter placed  10.  Status post seizure-on Dilantin    Subjective     History of Present Illness    Subjective  The patient continues to have decreased level of alertness, that is variable.  With occupation therapy transfers were moderate assist of 2.  With physical therapy bed mobility was dependent.  Noted to have dysphagia.      Per speech therapy evaluation-Pt w/ significant oral holding even w/ ice chips. No overt s/s of aspiration w/ thin by cup or straw when the pt swallowed within 1-15 seconds. However periodically he would hold the bolus from 1-3 minutes & appeared to frankly aspirate on thin liquids with hard coughing. No overt s/s of aspiration w/ nectar thick liquids by cup, but pt continued to hold the bolus for 30 sec+. Pt would not swallow puree until SLP provided a liquid wash, again holding bolus for >30 sec.     History taken from: patient   Objective     Vital Signs  Temp:  [98 °F (36.7 °C)-98.9 °F (37.2 °C)] 98.9 °F (37.2 °C)  Heart Rate:  [65-76] 76  Resp:  [16] 16  BP: (119-125)/(78-84) 124/84    Objective  Physical Exam  Mental status- he is awake but not to the normal level of alertness.     HEENT-keeps his head turned  to the left   Lungs-clear to auscultation anteriorly  Heart-regular rate and rhythm  Abdomen-PEG tube site with mild amount of bleeding. Positive bowel sounds. Soft.  Extremities-no edema  -Shanks catheter  Neurologic-- he is awake but not to a normal level of alertness..  Delay with his limited responses.  Oriented to person only.  He would keep his eyes closed for the most part.  He will take resistance with bilateral upper extremities but did not follow commands well enough for manual muscle testing in the lower extremities.  Results Review:     I reviewed the patient's new clinical results.    Results from last 7 days  Lab Units 06/22/17  0517 06/21/17  0830   WBC 10*3/mm3 7.64 7.61   HEMOGLOBIN g/dL 12.2* 12.3*   HEMATOCRIT % 36.7* 36.4*   PLATELETS 10*3/mm3 348 357         Results from last 7 days  Lab Units 06/22/17  0517 06/21/17  0830   SODIUM mmol/L 141 143   POTASSIUM mmol/L 3.5 3.6   CHLORIDE mmol/L 103 105   TOTAL CO2 mmol/L 28.0 28.4   BUN mg/dL 21* 20   CREATININE mg/dL 0.64* 0.68*   CALCIUM mg/dL 9.4 9.3   BILIRUBIN mg/dL  --  0.5   ALK PHOS U/L  --  69   ALT (SGPT) U/L  --  32   AST (SGOT) U/L  --  24   GLUCOSE mg/dL 116* 128*       Medication Review: done    aspirin 325 mg Per G Tube Daily   clopidogrel 75 mg Per G Tube Daily   [START ON 6/24/2017] phenytoin 300 mg Per G Tube Daily   tamsulosin 0.4 mg Oral Daily     HYDROcodone-acetaminophen  •  ondansetron  •  pneumococcal polysaccharide 23-valent  •  sodium chloride      Assessment/Plan     Principal Problem:    Seizure  Active Problems:    Subarachnoid hemorrhage    Somnolence    Oropharyngeal dysphagia    Protein-calorie malnutrition, moderate      Assessment & Plan  1. status post subarachnoid hemorrhage/intraventricular hemorrhage. Alba Mohamud grade 4  2. Recurrent basilar aneurysm  3.  External ventricular drain  4. June 4 cerebral angiogram with stent-assisted embolization - With the stent placement he should remain on aspirin 325 mg and  Plavix 75 mg for 3 months. After 3 months, he can return to aspirin 81 mg daily.  5.  Randi 15 ventricular peritoneal shunt Certas Plus Valve - setting of 4.   6. June 17-new bleed right parietal lobe small amount at the entrance site of the ventricular peritoneal shunt catheter into the brain parenchyma  7.  June 19 PEG tube placement  8. Failure to thrive.  9. Urinary retention - Shanks catheter placed  10.  Status post seizure-on Dilantin    Transfer pending back to the rehabilitation unit once precertification obtained.  Will consider amantadine for neuro stimulation.  He still has variable alertness.  Callum Baker MD  06/23/17  4:02 PM    Time:

## 2017-06-23 NOTE — DISCHARGE SUMMARY
"Date of Admission: 6/21/2017  Date of Discharge:  6/23/2017  Primary Care Physician: Jaz Devlin, DO     Discharge Diagnosis:  Active Hospital Problems (** Indicates Principal Problem)    Diagnosis Date Noted   • **Seizure [R56.9] 06/21/2017   • Subarachnoid hemorrhage [I60.9] 06/21/2017   • Somnolence [R40.0] 06/21/2017   • Oropharyngeal dysphagia [R13.12] 06/21/2017   • Protein-calorie malnutrition, moderate [E44.0] 06/21/2017      Resolved Hospital Problems    Diagnosis Date Noted Date Resolved   No resolved problems to display.       Presenting Problem/History of Present Illness  Subarachnoid hemorrhage [I60.9]     Hospital Course  The patient is a 57 y.o. male who Was transferred to our service from rehabilitation because of an episode of unresponsiveness.  He had an emergency CT of the head which fortunately did not show any bleeding or other acute abnormalities.  He was thought to have had a seizure.  He was seen by neurology and started on Dilantin.  He was sleepy yesterday but is alert today.  He is oriented to self only.  He did not have any complaints.  He was requiring frequent in and out bladder catheterizations at rehabilitation.  A Shanks was placed.  I'm told hospital policy requires urology to be consulted to leave the Shanks in place.  This can be done at rehabilitation.  Potassium was given yesterday for hypokalemia.  The patient is having a little bit of light around the G-tube.  It is functioning fine.  I reviewed the \"care everywhere\" records.  Dr. Zacarias placed the PEG.  Will ask him to see the patient which he can do at rehabilitation.  Patient is stable and ready for transfer back to acute rehabilitation.    Discharge was held over the weekend pending precert.  Hopefully he will get that today.  Urology has seen him in the interim.  Voiding trial is recommended in the next couple days.  He is still confused at times.  Neurology change the Dilantin to once daily per the G-tube.  " Consider decreasing the dose a bit which may help with his alertness.  Dr. Cooper evaluated the G-tube site.  Bleeding had subsided and no intervention was required.  Hemoglobin has been ok.    Stable condition; fair prognosis    Exam Today: No acute distress.  Chronically ill-appearing.  Frail.  Oriented to self only.  Lungs with decreased breath sounds, a few light crackles.  Heart is regular.  No murmur.  Abdomen there is a little bit of old blood around the G-tube site.  Belly is soft and nontender extremities no edema  For exam today 6/26/2017 please see progress note    Procedures Performed:  EEG 6/21/17 which showed polymorphic slowing at left anterior temporal/frontal and right mid temporal regions.  Left frontal area with reversal of slow waves.  Please see report for complete details  CT of the head without contrast 6/21/17 which showed no interval changes.     Pertinent labs  Glucose 116 BUN 21 creatinine 0.6 sodium 141 potassium 3.5 chloride 103 bicarbonate 28  White count 7.6 hemoglobin 12.2 platelets 348,000  Folate 8.3.  Iron low at 40 transferrin low at 126.  Iron saturation 21 and TIBC 188.  B12 946.  25-hydroxy vitamin D low at 24    From 6/26/2017:  glucose 107 BUN 25 creatinine 0.7 sodium 145 potassium 4.1 chloride 1027 bicarbonate 33  White count 6.7 hemoglobin 11.6 platelets 287,000.  Dilantin levels are still pending    Consults:    Dr. chinmay Cooper    Discharge Disposition  Rehab Facility or Unit (Formerly named [facility])    Discharge Medications:  See updated med rec list for interfacility medications    Discharge Diet:  Jevity 1.2 as ordered.  Free water was increased yesterday for hypernatremia    Activity at Discharge:  up with assist.  Continue PT, OT and speech    Follow-up Appointments:  PCP 1 to 2 weeks after release from rehabilitation      Test Results Pending at Discharge- Dilantin level       Lashawn Perry  MD  06/23/17  11:43 AM    Time Spent on Discharge Activities: 40 minutes.  Discussed with nurse and with CCP.  Medical record reviewed.    Total time today 35 minutes.  Updates done as noted in bold above

## 2017-06-23 NOTE — PLAN OF CARE
Problem: Inpatient Occupational Therapy  Goal: Static Sitting Balance Goal LTG- OT  Outcome: Ongoing (interventions implemented as appropriate)    06/23/17 1327   Static Sitting Balance OT LTG   Static Sitting Balance OT LTG, Time to Achieve 1 wk   Static Sitting Balance OT LTG, [place] Level (assess as appropriate to assist with adls)       Goal: Follow Directions Goal LTG- OT  Outcome: Ongoing (interventions implemented as appropriate)    06/23/17 1327   Follow Directions OT LTG   Follow Directions OT LTG, Time to Achieve 1 wk   Follow Directions OT LTG, Activity Type 1-Step;50% treatment session  (to assist with adls)       Goal: ADL Goal LTG- OT    06/23/17 1327   ADL OT LTG   ADL OT LTG, Time to Achieve 1 wk   ADL OT LTG, [place] Level max assist;max verbal cues

## 2017-06-24 LAB
ANION GAP SERPL CALCULATED.3IONS-SCNC: 13 MMOL/L
BUN BLD-MCNC: 24 MG/DL (ref 6–20)
BUN/CREAT SERPL: 35.3 (ref 7–25)
CALCIUM SPEC-SCNC: 9.7 MG/DL (ref 8.6–10.5)
CHLORIDE SERPL-SCNC: 105 MMOL/L (ref 98–107)
CO2 SERPL-SCNC: 29 MMOL/L (ref 22–29)
CREAT BLD-MCNC: 0.68 MG/DL (ref 0.76–1.27)
DEPRECATED RDW RBC AUTO: 53 FL (ref 37–54)
ERYTHROCYTE [DISTWIDTH] IN BLOOD BY AUTOMATED COUNT: 14.7 % (ref 11.5–14.5)
GFR SERPL CREATININE-BSD FRML MDRD: 120 ML/MIN/1.73
GLUCOSE BLD-MCNC: 112 MG/DL (ref 65–99)
HCT VFR BLD AUTO: 37.2 % (ref 40.4–52.2)
HGB BLD-MCNC: 12.4 G/DL (ref 13.7–17.6)
MCH RBC QN AUTO: 33 PG (ref 27–32.7)
MCHC RBC AUTO-ENTMCNC: 33.3 G/DL (ref 32.6–36.4)
MCV RBC AUTO: 98.9 FL (ref 79.8–96.2)
PLATELET # BLD AUTO: 329 10*3/MM3 (ref 140–500)
PMV BLD AUTO: 10 FL (ref 6–12)
POTASSIUM BLD-SCNC: 3.9 MMOL/L (ref 3.5–5.2)
RBC # BLD AUTO: 3.76 10*6/MM3 (ref 4.6–6)
SODIUM BLD-SCNC: 147 MMOL/L (ref 136–145)
WBC NRBC COR # BLD: 7.07 10*3/MM3 (ref 4.5–10.7)

## 2017-06-24 PROCEDURE — 97110 THERAPEUTIC EXERCISES: CPT

## 2017-06-24 PROCEDURE — 99221 1ST HOSP IP/OBS SF/LOW 40: CPT | Performed by: SURGERY

## 2017-06-24 PROCEDURE — 80048 BASIC METABOLIC PNL TOTAL CA: CPT | Performed by: INTERNAL MEDICINE

## 2017-06-24 PROCEDURE — 85027 COMPLETE CBC AUTOMATED: CPT | Performed by: INTERNAL MEDICINE

## 2017-06-24 RX ORDER — TERAZOSIN 1 MG/1
1 CAPSULE ORAL NIGHTLY
Status: DISCONTINUED | OUTPATIENT
Start: 2017-06-24 | End: 2017-06-26 | Stop reason: HOSPADM

## 2017-06-24 RX ORDER — MELATONIN
1000 2 TIMES DAILY
Status: DISCONTINUED | OUTPATIENT
Start: 2017-06-24 | End: 2017-06-26 | Stop reason: HOSPADM

## 2017-06-24 RX ADMIN — ASPIRIN 325 MG: 325 TABLET ORAL at 09:08

## 2017-06-24 RX ADMIN — CLOPIDOGREL 75 MG: 75 TABLET, FILM COATED ORAL at 09:08

## 2017-06-24 RX ADMIN — PHENYTOIN 300 MG: 125 SUSPENSION ORAL at 09:07

## 2017-06-24 RX ADMIN — VITAMIN D, TAB 1000IU (100/BT) 1000 UNITS: 25 TAB at 14:08

## 2017-06-24 RX ADMIN — VITAMIN D, TAB 1000IU (100/BT) 1000 UNITS: 25 TAB at 18:33

## 2017-06-24 NOTE — NURSING NOTE
No response from [facility] thus far.  Will continue to follow.  Thank you--Andree Crocker,  Rehab Adm Nurse

## 2017-06-24 NOTE — PROGRESS NOTES
"     LOS: 3 days   Primary Care Physician: Jaz Devlin, DO     Subjective   Awake.  Alert though confused.    Vital Signs  Body mass index is 17.65 kg/(m^2).  Temp:  [98.1 °F (36.7 °C)-98.7 °F (37.1 °C)] 98.3 °F (36.8 °C)  Heart Rate:  [70-82] 70  Resp:  [16-18] 16  BP: (110-129)/(76-84) 129/78      Objective:  General Appearance:  In no acute distress.    Vital signs: (most recent): Blood pressure 129/78, pulse 70, temperature 98.3 °F (36.8 °C), temperature source Oral, resp. rate 16, height 70\" (177.8 cm), weight 123 lb (55.8 kg), SpO2 98 %.    Lungs:  There are decreased breath sounds.  No wheezes, rales or rhonchi.    Heart: Normal rate.  Regular rhythm.  No murmur.   Abdomen: Abdomen is soft and non-distended.  (Small amount of blood around G-tube site.  Dressing was partially saturated)Bowel sounds are normal.   There is no abdominal tenderness.   There is no splenomegaly. There is no hepatomegaly.   Extremities: There is no dependent edema.    Neurological: Patient is alert.  (Oriented to self only).          Results Review:    I reviewed the patient's new clinical results.      Results from last 7 days  Lab Units 06/24/17  1253 06/22/17  0517   WBC 10*3/mm3 7.07 7.64   HEMOGLOBIN g/dL 12.4* 12.2*   PLATELETS 10*3/mm3 329 348       Results from last 7 days  Lab Units 06/24/17  1253 06/22/17  0517   SODIUM mmol/L 147* 141   POTASSIUM mmol/L 3.9 3.5   CHLORIDE mmol/L 105 103   TOTAL CO2 mmol/L 29.0 28.0   BUN mg/dL 24* 21*   CREATININE mg/dL 0.68* 0.64*   CALCIUM mg/dL 9.7 9.4   GLUCOSE mg/dL 112* 116*         Hemoglobin A1C:No results found for: HGBA1C    Glucose Range:No results found for: POCGLU    Lab Results   Component Value Date    [id number] 946 06/22/2017       No results found for: TSH    Assessment & Plan      Medication Review: Yes    Active Hospital Problems (** Indicates Principal Problem)    Diagnosis Date Noted   • **Seizure [R56.9] 06/21/2017   • Subarachnoid hemorrhage [I60.9] " 06/21/2017   • Somnolence [R40.0] 06/21/2017   • Oropharyngeal dysphagia [R13.12] 06/21/2017   • Protein-calorie malnutrition, moderate [E44.0] 06/21/2017      Resolved Hospital Problems    Diagnosis Date Noted Date Resolved   No resolved problems to display.       Assessment/Plan  1.  New onset seizure.  On Dilantin, Level pending.  Dosage changed to 300 mg daily at bedtime.  Repeat EEG in 2 or 3 months per neurology  2.  Status post subarachnoid hemorrhage/intraventricular ventricular hemorrhage.  Acute rehabilitation once precert obtained.  Aspirin and Plavix ×3 months then just aspirin 81 mg daily  3.  Bleeding around the PEG which was placed 6/19/17.  The surgeon who placed it does not come here.  Dr. Prince consulted  4.  Urinary retention.  Shanks in place.  Continue Flomax.  Voiding trial in 4 days per urology.    Lashawn Perry MD  06/24/17  4:36 PM

## 2017-06-24 NOTE — THERAPY TREATMENT NOTE
Acute Care - Physical Therapy Treatment Note  [facility]     Patient Name: Rafita Davis  : 1960  MRN: [id number]  Today's Date: 2017  Onset of Illness/Injury or Date of Surgery Date: 17     Referring Physician: Lester    Admit Date: 2017    Visit Dx:    ICD-10-CM ICD-9-CM   1. Decreased mobility R26.89 781.99     Patient Active Problem List   Diagnosis   • Subarachnoid bleed   • Subarachnoid hemorrhage   • Somnolence   • Oropharyngeal dysphagia   • Seizure   • Protein-calorie malnutrition, moderate               Adult Rehabilitation Note       17 1320 17 1400       Rehab Assessment/Intervention    Discipline physical therapy assistant  -JW --   PT Student  -EE,ALEXANDER,EE2     Document Type therapy note (daily note)  -JW therapy note (daily note)  -EE,ALEXANDER,EE2     Subjective Information --   confused  -JW agree to therapy;complains of;pain  -EE,ALEXANDER,EE2     Patient Effort, Rehab Treatment  fair  -EE,ALEXANDER,EE2     Precautions/Limitations fall precautions   PEG  -JW fall precautions  -EE,ALEXANDER,EE2     Recorded by [JW] Janie Kam, PTA [EE,ALEXANDER,EE2] Yamini Zendejas, PT (r) Cecilio Vincent, PT Student (t) Yamini Zendejas PT (c)     Pain Assessment    Pain Assessment --   grimaces with bed mob, c/o right knee pain with standing  -JW Unable to assess   Reports pain in legs, unable to give number  -EE,ALEXANDER,EE2     Recorded by [JW] Janie Kam PTA [EE,ALEXANDER,EE2] Yamini Zendejas PT (r) Cecilio Vincent, PT Student (t) Yamini Zendejas PT (c)     Cognitive Assessment/Intervention    Current Cognitive/Communication Assessment impaired  -JW impaired  -EE,ALEXANDER,EE2     Orientation Status oriented to;person;disoriented to;place;time;situation  -JW oriented to;person;disoriented to;place;time;situation  -EE,ALEXANDER,EE2     Follows Commands/Answers Questions 25% of the time;needs cueing;needs increased time;needs repetition  -JW 50% of the time;able to follow single-step instructions;needs cueing;needs increased time;needs  repetition  -EE,ALEXANDER,EE2     Personal Safety severe impairment  - severe impairment;decreased awareness, need for assist;decreased awareness, need for safety;decreased insight to deficits  -EE,ALEXANDER,EE2     Personal Safety Interventions fall prevention program maintained;gait belt  - fall prevention program maintained;muscle strengthening facilitated;nonskid shoes/slippers when out of bed;supervised activity  -EE,ALEXANDER,EE2     Recorded by [JW] Janie Kam, PTA [EE,ALEXANDER,EE2] Yamini Zendejas, PT (r) Cecilio Vincent, PT Student (t) Yamini Zendejas PT (c)     MMT (Manual Muscle Testing)    General MMT Assessment  upper extremity strength deficits identified  -EE,ALEXANDER,EE2     General MMT Assessment Detail  L biceps 4/5, L triceps 3+/5.  Difficult to assess due to confusion.  -EE,ALEXANDER,EE2     Recorded by  [EE,ALEXANDER,EE2] Yamini Zednejas PT (r) Cecilio Vincent, PT Student (t) Yamini Zendejas PT (c)     Bed Mobility, Assessment/Treatment    Bed Mob, Supine to Sit, [place] dependent (less than 25% patient effort);2 person assist required;verbal cues required;nonverbal cues required (demo/gesture)  -JW dependent (less than 25% patient effort);2 person assist required;verbal cues required;nonverbal cues required (demo/gesture)  -EE,ALEXANDER,EE2     Bed Mob, Sit to Supine, [place] dependent (less than 25% patient effort);2 person assist required;verbal cues required;nonverbal cues required (demo/gesture)  -JW dependent (less than 25% patient effort);2 person assist required;verbal cues required;nonverbal cues required (demo/gesture)  -EE,ALEXANDER,EE2     Bed Mobility, Safety Issues cognitive deficits limit understanding;decreased use of arms for pushing/pulling;decreased use of legs for bridging/pushing;impaired trunk control for bed mobility  - cognitive deficits limit understanding;decreased use of arms for pushing/pulling;decreased use of legs for bridging/pushing;impaired trunk control for bed mobility  -EE,ALEXANDER,EE2     Bed Mobility, Impairments  strength decreased;impaired balance;coordination impaired  -JW strength decreased;impaired balance;coordination impaired  -EE,ALEXANDER,EE2     Recorded by [JW] Janie Kam PTA [EE,ALEXANDER,EE2] Yamini Zendejas PT (r) Cecilio Vincent, PT Student (t) Yamini Zendejas PT (c)     Transfer Assessment/Treatment    Transfers, Bed-Chair [place]  unable to perform  -EE,ALEXANDER,EE2     Transfers, Sit-Stand [place] moderate assist (50% patient effort);maximum assist (25% patient effort);2 person assist required;verbal cues required;nonverbal cues required (demo/gesture);hand held assist  -JW      Transfers, Stand-Sit [place] moderate assist (50% patient effort);2 person assist required  -JW      Recorded by [MARSHALL] Janie Kam PTA [EE,ALEXANDER,EE2] Yamini Zendejas PT (r) Cecilio Vincent, PT Student (t) Yamini Zendejas PT (c)     Gait Assessment/Treatment    Gait, [place] Level not tested   c/o knee pain  -JW unable to perform  -EE,ALEXANDER,EE2     Gait, Comment unable to wt shift in standing  -JW      Recorded by [JW] Janie Kam PTA [EE,ALEXANDER,EE2] Yamini Zendejas PT (r) Cecilio Vincent, PT Student (t) Yamini Zendejas PT (c)     Motor Skills/Interventions    Additional Documentation  Balance Skills Training (Group)  -EE,ALEXANDER,EE2     Recorded by  [EE,ALEXANDER,EE2] Yamini Zendejas PT (r) Cecilio Vincent PT Student (t) Yamini Zendejas PT (c)     Balance Skills Training    Sitting-Level of Assistance Minimum assistance;Contact guard  - Minimum assistance  -EE,ALEXANDER,EE2     Sitting-Balance Support Right upper extremity supported;Left upper extremity supported;Feet supported  -JW Right upper extremity supported;Left upper extremity supported;Feet supported  -EE,ALEXANDER,EE2     Sitting-Balance Activities  Lateral lean;Forward lean;Reaching for objects;Reaching across midline;Right UE Weight Bearing;Left UE Weight Bearing;Trunk control activities  -EE,ALEXANDER,EE2     Sitting # of Minutes  5   Pt leans toward L side and lack orientation to midline  -EE,ALEXANDER,EE2     Recorded by [JW]  Janie Kam PTA [EE,ALEXANDER,EE2] Yamini Zendejas, PT (r) Cecilio Vincent, PT Student (t) Yamini Zendejas PT (c)     Therapy Exercises    Bilateral Lower Extremities AAROM:;5 reps;sitting;hip flexion;LAQ   Pt very confused and needs cues and assist for performance  -JW AAROM:;5 reps;sitting;hip flexion;LAQ   Pt very confused and needs cues and assist for performance  -EE,ALEXANDER,EE2     Recorded by [MARSHALL] Janie Kam PTA [EE,ALEXANDER,EE2] Yamini Zendejas PT (r) Cecilio Vincent, PT Student (t) Yamini Zendejas PT (c)     Positioning and Restraints    Pre-Treatment Position in bed  -JW in bed  -EE,ALEXANDER,EE2     Post Treatment Position bed  - bed  -EE,ALEXANDER,EE2     In Bed supine;call light within reach;encouraged to call for assist;exit alarm on  -JW call light within reach;encouraged to call for assist;with other staff;with family/caregiver   aide in room changing bed  -EE,ALEXANDER,EE2     Recorded by [JW] Janie Kam PTA [EE,ALEXANDER,EE2] Yamini Zendejas PT (r) Cecilio Vincent, PT Student (t) Yamini Zendejas PT (c)       User Key  (r) = Recorded By, (t) = Taken By, (c) = Cosigned By    Initials Name Effective Dates    EE Yamini Zendejas, PT 12/01/15 -     MARSHALL Kam PTA 02/18/16 -     ALEXANDER Vincent, PT Student 05/08/17 -                  PT Goals       06/22/17 1209          Bed Mobility PT LTG    Bed Mobility PT LTG, Date Established 06/22/17  -EE (r) ALEXANDER (t) EE (c)      Bed Mobility PT LTG, Time to Achieve 1 wk  -EE (r) ALEXANDER (t) EE (c)      Bed Mobility PT LTG, Activity Type all bed mobility  -EE (r) ALEXANDER (t) EE (c)      Bed Mobility PT LTG, [place] Level supervision required  -EE (r) ALEXANDER (t) EE (c)      Bed Mobility PT Goal  LTG, Assist Device bed rails  -EE (r) ALEXANDER (t) EE (c)      Transfer Training PT LTG    Transfer Training PT LTG, Date Established 06/22/17  -EE (r) ALEXANDER (t) EE (c)      Transfer Training PT LTG, Time to Achieve 1 wk  -EE (r) ALEXANDER (t) EE (c)      Transfer Training PT LTG, Activity Type all transfers  -EE (r) ALEXANDER (t) EE (c)       Transfer Training PT LTG, [place] Level contact guard assist  -EE (r) ALEXANDER (t) EE (c)      Transfer Training PT LTG, Assist Device --   appropriate AD  -EE (r) ALEXANDER (t) EE (c)      Gait Training PT LTG    Gait Training Goal PT LTG, Date Established 06/22/17  -EE (r) ALEXANDER (t) EE (c)      Gait Training Goal PT LTG, Time to Achieve 1 wk  -EE (r) ALEXANDER (t) EE (c)      Gait Training Goal PT LTG, [place] Level minimum assist (75% patient effort)  -EE (r) ALEXANDER (t) EE (c)      Gait Training Goal PT LTG, Assist Device --   appropriate AD  -EE (r) ALEXANDER (t) EE (c)      Gait Training Goal PT LTG, Distance to Achieve 50  -EE (r) ALEXANDER (t) EE (c)        User Key  (r) = Recorded By, (t) = Taken By, (c) = Cosigned By    Initials Name Provider Type    EE Yamini Zendejas, PT Physical Therapist    ALEXANDER Cecilio Vincent, PT Student PT Student          Physical Therapy Education     Title: PT OT SLP Therapies (Active)     Topic: Physical Therapy (Active)     Point: Mobility training (Active)    Learning Progress Summary    Learner Readiness Method Response Comment Documented by Status   Patient Acceptance E NR  JW 06/24/17 1328 Active    Acceptance E VU,NR   06/23/17 1434 Done    Acceptance E VU,NL,[facility] 06/22/17 1208 Done               Point: Body mechanics (Done)    Learning Progress Summary    Learner Readiness Method Response Comment Documented by Status   Patient Acceptance E VU,NR   06/23/17 1434 Done    Acceptance E VU,NL,[facility] 06/22/17 1208 Done               Point: Precautions (Done)    Learning Progress Summary    Learner Readiness Method Response Comment Documented by Status   Patient Acceptance E VU,NR   06/23/17 1434 Done    Acceptance E VU,NL,[facility] 06/22/17 1208 Done                      User Key     Initials Effective Dates Name Provider Type Discipline     02/18/16 -  Janie Kam, PTA Physical Therapy Assistant PT     05/08/17 -  Cecilio Vincent, PT Student PT Student PT                    PT Recommendation and  Plan  Anticipated Discharge Disposition: inpatient rehabilitation facility  Planned Therapy Interventions: balance training, bed mobility training, gait training, home exercise program, motor coordination training, patient/family education, stair training, strengthening  PT Frequency: daily  Plan of Care Review  Progress:  (improved sitting balance, cognition limited increased attemp)          Outcome Measures       06/24/17 1300 06/23/17 1400 06/23/17 1329    How much help from another person do you currently need...    Turning from your back to your side while in flat bed without using bedrails? 2  - 2  -EE (r) ALEXANDER (t) EE (c)     Moving from lying on back to sitting on the side of a flat bed without bedrails? 2  -JW 2  -EE (r) ALEXANDER (t) EE (c)     Moving to and from a bed to a chair (including a wheelchair)? 1  - 1  -EE (r) ALEXANDER (t) EE (c)     Standing up from a chair using your arms (e.g., wheelchair, bedside chair)? 2  - 1  -EE (r) ALEXANDER (t) EE (c)     Climbing 3-5 steps with a railing? 1  - 1  -EE (r) ALEXANDER (t) EE (c)     To walk in hospital room? 1  - 1  -EE (r) ALEXANDER (t) EE (c)     AM-PAC 6 Clicks Score 9  -JW 8  -EE (r) ALEXANDER (t)     How much help from another is currently needed...    Putting on and taking off regular lower body clothing?   1  -SG    Bathing (including washing, rinsing, and drying)   1  -SG    Toileting (which includes using toilet bed pan or urinal)   1  -SG    Putting on and taking off regular upper body clothing   1  -SG    Taking care of personal grooming (such as brushing teeth)   1  -SG    Eating meals   1  -SG    Score   6  -SG    Functional Assessment    Outcome Measure Options  AM-PAC 6 Clicks Daily Activity (OT)  -EE (r) ALEXANDER (t) EE (c) AM-PAC 6 Clicks Daily Activity (OT)  -SG      06/22/17 1200          How much help from another person do you currently need...    Turning from your back to your side while in flat bed without using bedrails? 2  -EE (r) ALEXANDER (t) EE (c)      Moving from lying  on back to sitting on the side of a flat bed without bedrails? 2  -EE (r) ALEXANDER (t) EE (c)      Moving to and from a bed to a chair (including a wheelchair)? 1  -EE (r) ALEXANDER (t) EE (c)      Standing up from a chair using your arms (e.g., wheelchair, bedside chair)? 2  -EE (r) ALEXANDER (t) EE (c)      Climbing 3-5 steps with a railing? 1  -EE (r) ALEXANDER (t) EE (c)      To walk in hospital room? 1  -EE (r) ALEXANDER (t) EE (c)      AM-PAC 6 Clicks Score 9  -EE (r) ALEXANDER (t)      Functional Assessment    Outcome Measure Options AM-PAC 6 Clicks Basic Mobility (PT)  -EE (r) ALEXANDER (t) EE (c)        User Key  (r) = Recorded By, (t) = Taken By, (c) = Cosigned By    Initials Name Provider Type     Susie Palma, OTR Occupational Therapist    AL Zendejas, PT Physical Therapist    MARSHALL Kam PTA Physical Therapy Assistant    ALEXANDER Vincent, PT Student PT Student           Time Calculation:         PT Charges       06/24/17 1304          Time Calculation    Start Time 1304  -      Stop Time 1319  -      Time Calculation (min) 15 min  -      PT Received On 06/24/17  -MARSHALL      PT - Next Appointment 06/25/17  -MARSHALL        User Key  (r) = Recorded By, (t) = Taken By, (c) = Cosigned By    Initials Name Provider Type    MARSHALL Kam PTA Physical Therapy Assistant          Therapy Charges for Today     Code Description Service Date Service Provider Modifiers Qty    75069779900 HC PT THER PROC EA 15 MIN 6/24/2017 Janie Kam PTA GP 1    45329808133 HC PT THER SUPP EA 15 MIN 6/24/2017 Janie Kam PTA GP 1          PT G-Codes  Outcome Measure Options: AM-PAC 6 Clicks Daily Activity (OT)    Janie Kam PTA  6/24/2017

## 2017-06-24 NOTE — PLAN OF CARE
Problem: Patient Care Overview (Adult)  Goal: Plan of Care Review  Outcome: Ongoing (interventions implemented as appropriate)    06/24/17 2365   Outcome Evaluation   Outcome Summary/Follow up Plan Alert to self. Denied pain. NIH 9. G-tube in place and patent. on continuous FT. G-tube site stille bleeding, surgeon consult for f/u. F/c in place and also patent w/clear yellow urine noted. Sleeping w/o SOA at rest or facial grimaces.

## 2017-06-24 NOTE — PLAN OF CARE
Problem: Nutrition, Enteral (Adult)  Goal: Signs and Symptoms of Listed Potential Problems Will be Absent or Manageable (Nutrition, Enteral)  Outcome: Ongoing (interventions implemented as appropriate)    Problem: Stroke (Hemorrhagic) (Adult)  Goal: Signs and Symptoms of Listed Potential Problems Will be Absent or Manageable (Stroke)  Outcome: Ongoing (interventions implemented as appropriate)    Problem: Fall Risk (Adult)  Goal: Absence of Falls  Outcome: Outcome(s) achieved Date Met:  06/24/17    Problem: Urine Elimination, Impaired (Adult)  Goal: Effective Urinary Elimination  Outcome: Ongoing (interventions implemented as appropriate)  Goal: Effective Containment of Urine  Outcome: Ongoing (interventions implemented as appropriate)  Goal: Reduced Incontinence Episodes  Outcome: Ongoing (interventions implemented as appropriate)    Problem: Patient Care Overview (Adult)  Goal: Plan of Care Review  Outcome: Ongoing (interventions implemented as appropriate)    06/24/17 0155   Coping/Psychosocial Response Interventions   Plan Of Care Reviewed With patient;daughter  (Message left on pt's daughter's phone, d/c on 07/24 Am.)   Outcome Evaluation   Outcome Summary/Follow up Plan A+O X 4. Denied pain. Started on Eliquis tonight. D/c order for 07/23 cancelled per Dr. Barros. D/c im Am on 07/24 at 0800. NH notified.

## 2017-06-24 NOTE — PLAN OF CARE
Problem: Patient Care Overview (Adult)  Goal: Plan of Care Review    06/24/17 1328   Patient Care Overview   Progress (improved sitting balance, cognition limited increased attemps for mobility)

## 2017-06-24 NOTE — PROGRESS NOTES
"   LOS: 3 days   Patient Care Team:  Jaz Devlin DO as PCP - General (Family Medicine)    Chief Complaint:   1. status post subarachnoid hemorrhage/intraventricular hemorrhage. Alba Mohamud grade 4  2. Recurrent basilar aneurysm  3.  External ventricular drain  4. June 4 cerebral angiogram with stent-assisted embolization - With the stent placement he should remain on aspirin 325 mg and Plavix 75 mg for 3 months. After 3 months, he can return to aspirin 81 mg daily.  5.  Randi 15 ventricular peritoneal shunt Certas Plus Valve - setting of 4.   6. June 17-new bleed right parietal lobe small amount at the entrance site of the ventricular peritoneal shunt catheter into the brain parenchyma  7.  June 19 PEG tube placement  8. Failure to thrive.  9. Urinary retention - Shanks catheter placed  10.  Status post seizure-on Dilantin    Subjective     History of Present Illness    Subjective  The patient continues to have decreased level of alertness.  He does not participate well with the examination.  Does not voice any response to questions.     History taken from: patient   Objective     Vital Signs  Temp:  [98.1 °F (36.7 °C)-98.9 °F (37.2 °C)] 98.1 °F (36.7 °C)  Heart Rate:  [76-82] 82  Resp:  [16-18] 18  BP: (110-124)/(76-84) 122/84    Objective:  Vital signs: (most recent): Blood pressure 122/84, pulse 82, temperature 98.1 °F (36.7 °C), temperature source Oral, resp. rate 18, height 70\" (177.8 cm), weight 123 lb (55.8 kg), SpO2 96 %.            Physical Exam  Mental status- he is far check   HEENT-keeps his head turned to the left   Lungs-clear to auscultation anteriorly  Heart-regular rate and rhythm  Abdomen-PEG tube site with some bleeding . Positive bowel sounds. Soft.  Extremities-no edema  -Shanks catheter  Neurologic-- he is awake but not to a normal level of alertness..  Delay with his limited responses.    He would keep his eyes closed for the most part.  He will take resistance with bilateral upper " extremities but did not follow commands well enough for manual muscle testing in the lower extremities.    Results Review:     I reviewed the patient's new clinical results.    Results from last 7 days  Lab Units 06/22/17  0517 06/21/17  0830   WBC 10*3/mm3 7.64 7.61   HEMOGLOBIN g/dL 12.2* 12.3*   HEMATOCRIT % 36.7* 36.4*   PLATELETS 10*3/mm3 348 357         Results from last 7 days  Lab Units 06/22/17  0517 06/21/17  0830   SODIUM mmol/L 141 143   POTASSIUM mmol/L 3.5 3.6   CHLORIDE mmol/L 103 105   TOTAL CO2 mmol/L 28.0 28.4   BUN mg/dL 21* 20   CREATININE mg/dL 0.64* 0.68*   CALCIUM mg/dL 9.4 9.3   BILIRUBIN mg/dL  --  0.5   ALK PHOS U/L  --  69   ALT (SGPT) U/L  --  32   AST (SGOT) U/L  --  24   GLUCOSE mg/dL 116* 128*       Medication Review: done    aspirin 325 mg Per G Tube Daily   cholecalciferol 1,000 Units Per G Tube BID   clopidogrel 75 mg Per G Tube Daily   phenytoin 300 mg Per G Tube Daily   terazosin 1 mg Per G Tube Nightly     HYDROcodone-acetaminophen  •  ondansetron  •  pneumococcal polysaccharide 23-valent  •  sodium chloride      Assessment/Plan     Principal Problem:    Seizure  Active Problems:    Subarachnoid hemorrhage    Somnolence    Oropharyngeal dysphagia    Protein-calorie malnutrition, moderate      Assessment & Plan  1. status post subarachnoid hemorrhage/intraventricular hemorrhage. Alba Mohamud grade 4  2. Recurrent basilar aneurysm  3.  External ventricular drain  4. June 4 cerebral angiogram with stent-assisted embolization - With the stent placement he should remain on aspirin 325 mg and Plavix 75 mg for 3 months. After 3 months, he can return to aspirin 81 mg daily.  5.  Randi 15 ventricular peritoneal shunt Certas Plus Valve - setting of 4.   6. June 17-new bleed right parietal lobe small amount at the entrance site of the ventricular peritoneal shunt catheter into the brain parenchyma  7.  June 19 PEG tube placement  8. Failure to thrive.  9. Urinary retention - Shanks catheter  placed  10.  Status post seizure-on Dilantin    Transfer pending back to the rehabilitation unit once precertification obtained.  Will consider amantadine for neuro stimulation.  He  has variable alertness.    Callum Baker MD  06/24/17  10:40 AM    Time:

## 2017-06-24 NOTE — PLAN OF CARE
Problem: Nutrition, Enteral (Adult)  Goal: Signs and Symptoms of Listed Potential Problems Will be Absent or Manageable (Nutrition, Enteral)  Outcome: Ongoing (interventions implemented as appropriate)    Problem: Stroke (Hemorrhagic) (Adult)  Goal: Signs and Symptoms of Listed Potential Problems Will be Absent or Manageable (Stroke)  Outcome: Ongoing (interventions implemented as appropriate)    Problem: Urine Elimination, Impaired (Adult)  Goal: Effective Urinary Elimination  Outcome: Ongoing (interventions implemented as appropriate)  Goal: Effective Containment of Urine  Outcome: Ongoing (interventions implemented as appropriate)  Goal: Reduced Incontinence Episodes  Outcome: Ongoing (interventions implemented as appropriate)    Problem: Patient Care Overview (Adult)  Goal: Plan of Care Review  Outcome: Ongoing (interventions implemented as appropriate)

## 2017-06-24 NOTE — CONSULTS
Chief complaint: Bleeding around gastrostomy tube    History presenting illness: I was contacted to see the patient due to some bleeding around his gastrostomy tube.  Patient says this had started earlier in the day.  There've been no trauma that he was aware of.  The patient is on Plavix.  His tube feeds have been tolerated without issue    Physical exam:  Gastrointestinal: The drainage around the gastrostomy tube was bloody.  There was no purulent drainage.  The abdomen is soft and benign.  There was some mild bruising of the skin.    Assessment and plan:  Mild to moderate bleeding around the gastrostomy tube.  There was no bleeding through the tube.  This is likely related to some subcutaneous irritation.  I cinched the tube up a little closer against the bumper to try to provide some pressure.  It was redressed with some split gauze.  We will follow-up for signs of further bleeding.  No plans for any surgical intervention.    Josiah Cooper MD  General and Endoscopic Surgery  [facility]    [address], [address]  [place], [place], [address]  P: [phone]  F: [phone]

## 2017-06-25 PROCEDURE — 99024 POSTOP FOLLOW-UP VISIT: CPT | Performed by: SURGERY

## 2017-06-25 PROCEDURE — 97110 THERAPEUTIC EXERCISES: CPT

## 2017-06-25 RX ADMIN — VITAMIN D, TAB 1000IU (100/BT) 1000 UNITS: 25 TAB at 08:27

## 2017-06-25 RX ADMIN — TERAZOSIN HYDROCHLORIDE 1 MG: 1 CAPSULE ORAL at 01:29

## 2017-06-25 RX ADMIN — PHENYTOIN 300 MG: 125 SUSPENSION ORAL at 08:38

## 2017-06-25 RX ADMIN — ASPIRIN 325 MG: 325 TABLET ORAL at 08:27

## 2017-06-25 RX ADMIN — TERAZOSIN HYDROCHLORIDE 1 MG: 1 CAPSULE ORAL at 22:44

## 2017-06-25 RX ADMIN — VITAMIN D, TAB 1000IU (100/BT) 1000 UNITS: 25 TAB at 17:58

## 2017-06-25 RX ADMIN — CLOPIDOGREL 75 MG: 75 TABLET, FILM COATED ORAL at 08:27

## 2017-06-25 NOTE — THERAPY TREATMENT NOTE
Acute Care - Physical Therapy Treatment Note  [facility]     Patient Name: Rafita Davis  : 1960  MRN: [id number]  Today's Date: 2017  Onset of Illness/Injury or Date of Surgery Date: 17     Referring Physician: Lester    Admit Date: 2017    Visit Dx:    ICD-10-CM ICD-9-CM   1. Decreased mobility R26.89 781.99     Patient Active Problem List   Diagnosis   • Subarachnoid bleed   • Subarachnoid hemorrhage   • Somnolence   • Oropharyngeal dysphagia   • Seizure   • Protein-calorie malnutrition, moderate               Adult Rehabilitation Note       17 1133 17 1320 17 1400    Rehab Assessment/Intervention    Discipline physical therapist  -DM physical therapy assistant  -JW --   PT Student  -EE,ALEXANDER,EE2    Document Type therapy note (daily note)  -DM therapy note (daily note)  -JW therapy note (daily note)  -EE,ALEXANDER,EE2    Subjective Information  --   confused  -JW agree to therapy;complains of;pain  -EE,ALEXANDER,EE2    Patient Effort, Rehab Treatment good  -DM  fair  -EE,ALEXANDER,EE2    Symptoms Noted During/After Treatment none  -DM      Precautions/Limitations fall precautions;other (see comments)   PEG  -DM fall precautions   PEG  -JW fall precautions  -EE,ALEXANDER,EE2    Specific Treatment Considerations confusion  -DM      Recorded by [DM] Cris Khan, PT [JW] Janie Kam, PTA [EE,ALEXANDER,EE2] Yamini Zendejas, PT (r) Cecilio Vincent, PT Student (t) Yamini Zendejas, PT (c)    Vital Signs    Pre Patient Position Supine  -DM      Intra Patient Position Sitting  -DM      Post Patient Position Supine  -DM      Recorded by [DM] Cris Khan, PT      Pain Assessment    Pain Assessment No/denies pain  -DM --   grimaces with bed mob, c/o right knee pain with standing  -JW Unable to assess   Reports pain in legs, unable to give number  -EE,ALEXANDER,EE2    Recorded by [DM] Cris Khan, PT [JW] Janie Kam, PTA [EE,ALEXANDER,EE2] Yamini Zendejas, PT (r) Cecilio Vincent, PT Student (t) Yamini Zendejas, PT (c)    Vision  Assessment/Intervention    Visual Impairment WFL  -DM      Recorded by [DM] Cris Khan, PT      Cognitive Assessment/Intervention    Current Cognitive/Communication Assessment impaired  -DM impaired  -JW impaired  -EE,ALEXANDER,EE2    Orientation Status oriented to;person  -DM oriented to;person;disoriented to;place;time;situation  -JW oriented to;person;disoriented to;place;time;situation  -EE,ALEXANDER,EE2    Follows Commands/Answers Questions 75% of the time;able to follow single-step instructions;needs cueing;needs increased time;needs repetition  -DM 25% of the time;needs cueing;needs increased time;needs repetition  -JW 50% of the time;able to follow single-step instructions;needs cueing;needs increased time;needs repetition  -EE,ALEXANDER,EE2    Personal Safety severe impairment  -DM severe impairment  -JW severe impairment;decreased awareness, need for assist;decreased awareness, need for safety;decreased insight to deficits  -EE,ALEXANDER,EE2    Personal Safety Interventions fall prevention program maintained;muscle strengthening facilitated;nonskid shoes/slippers when out of bed;supervised activity  -DM fall prevention program maintained;gait belt  -JW fall prevention program maintained;muscle strengthening facilitated;nonskid shoes/slippers when out of bed;supervised activity  -EE,ALEXANDER,EE2    Recorded by [DM] Cris Khan, PT [JW] Janie Kam, PTA [EE,ALEXANDER,EE2] Yamini Zendejas, PT (r) Cecilio Vincent, PT Student (t) Yamnii Zendejas, SRINIVASAN (c)    MMT (Manual Muscle Testing)    General MMT Assessment   upper extremity strength deficits identified  -EE,ALEXANDER,EE2    General MMT Assessment Detail   L biceps 4/5, L triceps 3+/5.  Difficult to assess due to confusion.  -EE,ALEXANDER,EE2    Recorded by   [EE,ALEXANDER,EE2] Yamini Zendejas PT (r) Cecilio Vincent, PT Student (t) Yamini Zendejas PT (c)    Bed Mobility, Assessment/Treatment    Bed Mobility, Assistive Device bed rails  -DM      Bed Mob, Supine to Sit, [place] verbal cues required;nonverbal cues required  (demo/gesture);maximum assist (25% patient effort)  -DM dependent (less than 25% patient effort);2 person assist required;verbal cues required;nonverbal cues required (demo/gesture)  -JW dependent (less than 25% patient effort);2 person assist required;verbal cues required;nonverbal cues required (demo/gesture)  -EE,ALEXANDER,EE2    Bed Mob, Sit to Supine, [place] maximum assist (25% patient effort);verbal cues required  -DM dependent (less than 25% patient effort);2 person assist required;verbal cues required;nonverbal cues required (demo/gesture)  -JW dependent (less than 25% patient effort);2 person assist required;verbal cues required;nonverbal cues required (demo/gesture)  -EE,ALEXANDER,EE2    Bed Mobility, Safety Issues cognitive deficits limit understanding;decreased use of arms for pushing/pulling;decreased use of legs for bridging/pushing  -DM cognitive deficits limit understanding;decreased use of arms for pushing/pulling;decreased use of legs for bridging/pushing;impaired trunk control for bed mobility  -JW cognitive deficits limit understanding;decreased use of arms for pushing/pulling;decreased use of legs for bridging/pushing;impaired trunk control for bed mobility  -EE,ALEXANDER,EE2    Bed Mobility, Impairments strength decreased;impaired balance  -DM strength decreased;impaired balance;coordination impaired  -JW strength decreased;impaired balance;coordination impaired  -EE,ALEXANDER,EE2    Recorded by [DM] Cris Khan, PT [JW] Janie Kam, PTA [EE,ALEXANDER,EE2] Yamini Zendejas, PT (r) Cecilio Vincent PT Student (t) Yamini Zendejas, PT (c)    Transfer Assessment/Treatment    Transfers, Bed-Chair [place]   unable to perform  -EE,ALEXANDER,EE2    Transfers, Sit-Stand [place] not tested  -DM moderate assist (50% patient effort);maximum assist (25% patient effort);2 person assist required;verbal cues required;nonverbal cues required (demo/gesture);hand held assist  -JW     Transfers, Stand-Sit [place] not tested  -DM moderate  assist (50% patient effort);2 person assist required  -JW     Recorded by [DM] Cris Khan, PT [JW] Janie Kam PTA [EE,ALEXANDER,EE2] Yamini Zendejas PT (r) Cecilio Vincent, PT Student (t) Yamini Zendejas PT (c)    Gait Assessment/Treatment    Gait, [place] Level not tested  -DM not tested   c/o knee pain  -JW unable to perform  -EE,ALEXANDER,EE2    Gait, Comment  unable to wt shift in standing  -JW     Recorded by [DM] Cris Khan, PT [JW] Janie Kam PTA [EE,ALEXANDER,EE2] Yamini Zendejas PT (r) Cecilio Vincent, PT Student (t) Yamini Zendejas PT (c)    Motor Skills/Interventions    Additional Documentation Balance Skills Training (Group)  -MARY  Balance Skills Training (Group)  -EE,ALEXANDER,EE2    Recorded by [DM] Cris Khan PT  [EE,ALEXANDER,EE2] Yamini Zendejas PT (r) Cecilio Vincent, PT Student (t) Yamini Zendejas PT (c)    Balance Skills Training    Sitting-Level of Assistance Close supervision  -DM Minimum assistance;Contact guard  - Minimum assistance  -EE,ALEXANDER,EE2    Sitting-Balance Support Feet supported  -DM Right upper extremity supported;Left upper extremity supported;Feet supported  -JW Right upper extremity supported;Left upper extremity supported;Feet supported  -EE,ALEXANDER,EE2    Sitting-Balance Activities Head control activities (Comment);Reaching for objects;Trunk control activities  -DM  Lateral lean;Forward lean;Reaching for objects;Reaching across midline;Right UE Weight Bearing;Left UE Weight Bearing;Trunk control activities  -EE,ALEXANDER,EE2    Sitting # of Minutes 8  -DM  5   Pt leans toward L side and lack orientation to midline  -EE,ALEXANDER,EE2    Recorded by [DM] Cris Khan, PT [JW] Janie Kam PTA [EE,ALEXANDER,EE2] Yamini Zendejas PT (r) Cecilio Vincent, PT Student (t) Yamini Zendejas PT (c)    Therapy Exercises    Bilateral Lower Extremities AAROM:;5 reps;LAQ;AROM:;10 reps;ankle pumps/circles  -DM AAROM:;5 reps;sitting;hip flexion;LAQ   Pt very confused and needs cues and assist for performance  -MARSHALL AAROM:;5 reps;sitting;hip flexion;LAQ   Pt  very confused and needs cues and assist for performance  -EE,ALEXANDER,EE2    Bilateral Upper Extremity AROM:;5 reps;sitting;other reps   reaching to touch my hand 3 feet away,in center   -      Recorded by [DM] Cris Khan, PT [JW] Janie Kam PTA [EE,ALEXANDER,EE2] Yamini Zendejas, PT (r) Cecilio Vincent, PT Student (t) Yamini Zendejas, PT (c)    Positioning and Restraints    Pre-Treatment Position in bed  -DM in bed  -JW in bed  -EE,ALEXANDER,EE2    Post Treatment Position bed  - bed  - bed  -EE,ALEXANDER,EE2    In Bed notified nsg;fowlers;call light within reach;encouraged to call for assist;exit alarm on;SCD pump applied   pillow under knees  - supine;call light within reach;encouraged to call for assist;exit alarm on  - call light within reach;encouraged to call for assist;with other staff;with family/caregiver   aide in room changing bed  -EE,ALEXANDER,EE2    Recorded by [DM] Cris Khan, PT [JW] Janie Kam PTA [EE,ALEXANDER,EE2] Yamini Zendejas, PT (r) Cecilio Vincent, PT Student (t) Yamini Zendejas, SRINIVASAN (c)      User Key  (r) = Recorded By, (t) = Taken By, (c) = Cosigned By    Initials Name Effective Dates     Cris Khan, PT 10/06/15 -     AL Zendejas, PT 12/01/15 -     MARSHALL Kam PTA 02/18/16 -     ALEXANDER Vincent, PT Student 05/08/17 -                 IP PT Goals       06/22/17 1209          Bed Mobility PT LTG    Bed Mobility PT LTG, Date Established 06/22/17  -EE (r) ALEXANDER (t) EE (c)      Bed Mobility PT LTG, Time to Achieve 1 wk  -EE (r) ALEXANDER (t) EE (c)      Bed Mobility PT LTG, Activity Type all bed mobility  -EE (r) ALEXANDER (t) EE (c)      Bed Mobility PT LTG, [place] Level supervision required  -EE (r) ALEXANDER (t) EE (c)      Bed Mobility PT Goal  LTG, Assist Device bed rails  -EE (r) ALEXANDER (t) EE (c)      Transfer Training PT LTG    Transfer Training PT LTG, Date Established 06/22/17  -EE (r) ALEXANDER (t) EE (c)      Transfer Training PT LTG, Time to Achieve 1 wk  -EE (r) ALEXANDER (t) EE (c)      Transfer Training PT LTG, Activity Type all  transfers  -EE (r) ALEXANDER (t) EE (c)      Transfer Training PT LTG, [place] Level contact guard assist  -EE (r) ALEXANDER (t) EE (c)      Transfer Training PT LTG, Assist Device --   appropriate AD  -EE (r) ALEXANDER (t) EE (c)      Gait Training PT LTG    Gait Training Goal PT LTG, Date Established 06/22/17  -EE (r) ALEXANDER (t) EE (c)      Gait Training Goal PT LTG, Time to Achieve 1 wk  -EE (r) ALEXANDER (t) EE (c)      Gait Training Goal PT LTG, [place] Level minimum assist (75% patient effort)  -EE (r) ALEXANDER (t) EE (c)      Gait Training Goal PT LTG, Assist Device --   appropriate AD  -EE (r) ALEXANDER (t) EE (c)      Gait Training Goal PT LTG, Distance to Achieve 50  -EE (r) ALEXANDER (t) EE (c)        User Key  (r) = Recorded By, (t) = Taken By, (c) = Cosigned By    Initials Name Provider Type    AL Zendejas, PT Physical Therapist    ALEXANDER Vincent, PT Student PT Student          Physical Therapy Education     Title: PT OT SLP Therapies (Active)     Topic: Physical Therapy (Done)     Point: Mobility training (Done)    Learning Progress Summary    Learner Readiness Method Response Comment Documented by Status   Patient Acceptance EGINGER D DU,NR   06/25/17 1204 Done    Acceptance E PASCALE   06/24/17 1328 Active    Acceptance E [facility] 06/23/17 1434 Done    Acceptance E VU,NL,[facility] 06/22/17 1208 Done               Point: Home exercise program (Done)    Learning Progress Summary    Learner Readiness Method Response Comment Documented by Status   Patient Acceptance EGINGER D DUNR   06/25/17 1204 Done               Point: Body mechanics (Done)    Learning Progress Summary    Learner Readiness Method Response Comment Documented by Status   Patient Acceptance GINGER CAR D DU,NR   06/25/17 1204 Done    Acceptance E VU,[facility] 06/23/17 1434 Done    Acceptance E VU,NL,[facility] 06/22/17 1208 Done               Point: Precautions (Done)    Learning Progress Summary    Learner Readiness Method Response Comment Documented by Status   Patient Acceptance GINGER CAR D  DU,NR  DM 06/25/17 1204 Done    Acceptance E VU,NR   06/23/17 1434 Done    Acceptance E VU,NL,NR   06/22/17 1208 Done                      User Key     Initials Effective Dates Name Provider Type Discipline     10/06/15 -  Cris Khan, PT Physical Therapist PT     02/18/16 -  Janie Kam, PTA Physical Therapy Assistant PT     05/08/17 -  Cecilio Vincent, PT Student PT Student PT                    PT Recommendation and Plan  Anticipated Discharge Disposition: inpatient rehabilitation facility  Planned Therapy Interventions: balance training, bed mobility training, gait training, home exercise program, motor coordination training, patient/family education, stair training, strengthening  PT Frequency: daily  Plan of Care Review  Plan Of Care Reviewed With: patient  Progress: progress towards functional goals is fair  Outcome Summary/Follow up Plan: Pt was more alert today, conversing with PT but confused -oriented to self only. Max A to sit at EOB then became more awake, following most commands for B LE therex and B UE reaching activites. Able to sit at EOB w/o physical assistance x 8 minutes.          Outcome Measures       06/25/17 1200 06/24/17 1300 06/23/17 1400    How much help from another person do you currently need...    Turning from your back to your side while in flat bed without using bedrails? 2  - 2  - 2  -EE (r) ALEXANDER (t) EE (c)    Moving from lying on back to sitting on the side of a flat bed without bedrails? 2  - 2  - 2  -EE (r) ALEXANDER (t) EE (c)    Moving to and from a bed to a chair (including a wheelchair)? 1  - 1  - 1  -EE (r) ALEXANDER (t) EE (c)    Standing up from a chair using your arms (e.g., wheelchair, bedside chair)? 2  - 2  - 1  -EE (r) ALEXANDER (t) EE (c)    Climbing 3-5 steps with a railing? 1  - 1  - 1  -EE (r) ALEXANDER (t) EE (c)    To walk in hospital room? 1  - 1  - 1  -EE (r) ALEXANDER (t) EE (c)    AM-PAC 6 Clicks Score 9  - 9  - 8  -EE (r) ALEXANDER (t)    Functional  Assessment    Outcome Measure Options AM-PAC 6 Clicks Basic Mobility (PT)  -DM  AM-PAC 6 Clicks Daily Activity (OT)  -EE (r) ALEXANDER (t) EE (c)      06/23/17 1329          How much help from another is currently needed...    Putting on and taking off regular lower body clothing? 1  -SG      Bathing (including washing, rinsing, and drying) 1  -SG      Toileting (which includes using toilet bed pan or urinal) 1  -SG      Putting on and taking off regular upper body clothing 1  -SG      Taking care of personal grooming (such as brushing teeth) 1  -SG      Eating meals 1  -SG      Score 6  -SG      Functional Assessment    Outcome Measure Options AM-PAC 6 Clicks Daily Activity (OT)  -SG        User Key  (r) = Recorded By, (t) = Taken By, (c) = Cosigned By    Initials Name Provider Type    SG Susie Palma, OTR Occupational Therapist    MARY Khan, PT Physical Therapist    EE Yamini Zendejas, PT Physical Therapist    MARSHALL Kam, PTA Physical Therapy Assistant    ALEXANDER Vincent, PT Student PT Student           Time Calculation:         PT Charges       06/25/17 1205          Time Calculation    Start Time 1143  -DM      Stop Time 1200  -DM      Time Calculation (min) 17 min  -DM      PT Received On 06/25/17  -DM      PT - Next Appointment 06/26/17  -DM        User Key  (r) = Recorded By, (t) = Taken By, (c) = Cosigned By    Initials Name Provider Type    MARY Khan, PT Physical Therapist          Therapy Charges for Today     Code Description Service Date Service Provider Modifiers Qty    23026668747 HC PT THER PROC EA 15 MIN 6/25/2017 Cris Khan, PT GP 1          PT G-Codes  Outcome Measure Options: AM-PAC 6 Clicks Basic Mobility (PT)    Cris Khan, PT  6/25/2017

## 2017-06-25 NOTE — PLAN OF CARE
Problem: Patient Care Overview (Adult)  Goal: Plan of Care Review  Outcome: Ongoing (interventions implemented as appropriate)    06/25/17 2170   Coping/Psychosocial Response Interventions   Plan Of Care Reviewed With patient   Patient Care Overview   Progress no change   Outcome Evaluation   Outcome Summary/Follow up Plan Patient has been drowsy today but arouses. Oriented to self only. VSS. Falls protocol continues. No bleeding noticed from g-tube site. Continue to monitor..       Goal: Adult Individualization and Mutuality  Outcome: Ongoing (interventions implemented as appropriate)  Goal: Discharge Needs Assessment  Outcome: Ongoing (interventions implemented as appropriate)

## 2017-06-25 NOTE — PROGRESS NOTES
G-tube site inspected and no signs of bleeding seen at this time.  Hemoglobin stable.  Tube is functioning well.  Will see on an as-needed basis.    Josiah Cooper MD  General and Endoscopic Surgery  [facility]    [address], [address]  [place], [place], [address]  P: [phone]  F: [phone]

## 2017-06-25 NOTE — PROGRESS NOTES
"     LOS: 4 days   Primary Care Physician: Jaz Devlin, DO     Subjective   Alert and awake although still confused.  I don't think he remembers me.  No complaints    Vital Signs  Body mass index is 17.65 kg/(m^2).  Temp:  [97.8 °F (36.6 °C)-98.4 °F (36.9 °C)] 98.4 °F (36.9 °C)  Heart Rate:  [71-79] 75  Resp:  [16] 16  BP: (105-129)/(69-83) 105/69      Objective:  General Appearance:  In no acute distress (Looks older than age.  Cooperative and calm).    Vital signs: (most recent): Blood pressure 105/69, pulse 75, temperature 98.4 °F (36.9 °C), temperature source Oral, resp. rate 16, height 70\" (177.8 cm), weight 123 lb (55.8 kg), SpO2 97 %.    Lungs:  There are decreased breath sounds.  No wheezes, rales or rhonchi.    Heart: Normal rate.  Regular rhythm.  No murmur.   Abdomen: Abdomen is soft and non-distended.  (Bandage over G-tube site which I did not remove)Bowel sounds are normal.   There is no abdominal tenderness.   There is no splenomegaly. There is no hepatomegaly.   Extremities: There is no dependent edema.    Neurological: Patient is alert.  (Oriented to self).          Results Review:    I reviewed the patient's new clinical results.      Results from last 7 days  Lab Units 06/24/17  1253 06/22/17  0517   WBC 10*3/mm3 7.07 7.64   HEMOGLOBIN g/dL 12.4* 12.2*   PLATELETS 10*3/mm3 329 348       Results from last 7 days  Lab Units 06/24/17  1253 06/22/17  0517   SODIUM mmol/L 147* 141   POTASSIUM mmol/L 3.9 3.5   CHLORIDE mmol/L 105 103   TOTAL CO2 mmol/L 29.0 28.0   BUN mg/dL 24* 21*   CREATININE mg/dL 0.68* 0.64*   CALCIUM mg/dL 9.7 9.4   GLUCOSE mg/dL 112* 116*         Hemoglobin A1C:No results found for: HGBA1C    Glucose Range:No results found for: POCGLU    Lab Results   Component Value Date    [id number] 946 06/22/2017       No results found for: TSH    Assessment & Plan      Medication Review: Yes    Active Hospital Problems (** Indicates Principal Problem)    Diagnosis Date Noted   • " **Seizure [R56.9] 06/21/2017   • Subarachnoid hemorrhage [I60.9] 06/21/2017   • Somnolence [R40.0] 06/21/2017   • Oropharyngeal dysphagia [R13.12] 06/21/2017   • Protein-calorie malnutrition, moderate [E44.0] 06/21/2017      Resolved Hospital Problems    Diagnosis Date Noted Date Resolved   No resolved problems to display.       Assessment/Plan  1.  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  On Dilantin.  Level from 2 days ago still pending.  Repeat EEG 2-3 months per neuro.  Acute rehabilitation pending precert.  Aspirin and Plavix ×3 months then just aspirin 81 mg daily  2.  Previous bleeding around PEG.  Dr. Cooper evaluated the patient-no bleeding at this time.  Hemoglobin okay yesterday.  3.  Urinary retention with Shanks in place.  Voiding trial in 3 days per urology.  Continue Flomax    Lashawn Perry MD  06/25/17  6:33 PM

## 2017-06-25 NOTE — PLAN OF CARE
Problem: Patient Care Overview (Adult)  Goal: Plan of Care Review  Outcome: Ongoing (interventions implemented as appropriate)    06/25/17 1203   Coping/Psychosocial Response Interventions   Plan Of Care Reviewed With patient   Patient Care Overview   Progress progress towards functional goals is fair   Outcome Evaluation   Outcome Summary/Follow up Plan Pt was more alert today, conversing with PT but confused -oriented to self only. Max A to sit at EOB then became more awake, following most commands for B LE therex and B UE reaching activites. Able to sit at EOB w/o physical assistance x 8 minutes.

## 2017-06-25 NOTE — PLAN OF CARE
Problem: Nutrition, Enteral (Adult)  Goal: Signs and Symptoms of Listed Potential Problems Will be Absent or Manageable (Nutrition, Enteral)  Outcome: Ongoing (interventions implemented as appropriate)    Problem: Stroke (Hemorrhagic) (Adult)  Goal: Signs and Symptoms of Listed Potential Problems Will be Absent or Manageable (Stroke)  Outcome: Ongoing (interventions implemented as appropriate)    Problem: Urine Elimination, Impaired (Adult)  Goal: Effective Urinary Elimination  Outcome: Ongoing (interventions implemented as appropriate)  Goal: Effective Containment of Urine  Outcome: Ongoing (interventions implemented as appropriate)  Goal: Reduced Incontinence Episodes  Outcome: Ongoing (interventions implemented as appropriate)    Problem: Patient Care Overview (Adult)  Goal: Plan of Care Review  Outcome: Ongoing (interventions implemented as appropriate)    06/25/17 0341   Coping/Psychosocial Response Interventions   Plan Of Care Reviewed With patient   Patient Care Overview   Progress improving   Outcome Evaluation   Outcome Summary/Follow up Plan NIH 6, more awake + communicating better w/RN. Denied pain. G-tube in place and patent, less bleeding present, and on Jevity 1.2 at 60 ml/hr w/o c/o N/V/D. F/c patent, in place w/clear yellow urine in F/c bag at bedside drainage. Sleeping w/o SOA at rest.

## 2017-06-26 ENCOUNTER — HOSPITAL ENCOUNTER (INPATIENT)
Facility: HOSPITAL | Age: 57
LOS: 32 days | Discharge: NURSING, REHAB OR LONG-TERM CARE | End: 2017-07-28
Attending: PHYSICAL MEDICINE & REHABILITATION | Admitting: PHYSICAL MEDICINE & REHABILITATION

## 2017-06-26 VITALS
HEIGHT: 70 IN | OXYGEN SATURATION: 96 % | SYSTOLIC BLOOD PRESSURE: 130 MMHG | HEART RATE: 76 BPM | BODY MASS INDEX: 17.93 KG/M2 | DIASTOLIC BLOOD PRESSURE: 82 MMHG | WEIGHT: 125.22 LBS | RESPIRATION RATE: 16 BRPM | TEMPERATURE: 98.2 F

## 2017-06-26 DIAGNOSIS — R26.81 UNSTEADY GAIT: Primary | ICD-10-CM

## 2017-06-26 PROBLEM — R40.0 SOMNOLENCE: Status: RESOLVED | Noted: 2017-06-21 | Resolved: 2017-06-26

## 2017-06-26 PROBLEM — R33.9 URINARY RETENTION: Status: ACTIVE | Noted: 2017-06-26

## 2017-06-26 LAB
ANION GAP SERPL CALCULATED.3IONS-SCNC: 10.5 MMOL/L
BUN BLD-MCNC: 25 MG/DL (ref 6–20)
BUN/CREAT SERPL: 32.9 (ref 7–25)
CALCIUM SPEC-SCNC: 9.5 MG/DL (ref 8.6–10.5)
CHLORIDE SERPL-SCNC: 102 MMOL/L (ref 98–107)
CO2 SERPL-SCNC: 32.5 MMOL/L (ref 22–29)
CREAT BLD-MCNC: 0.76 MG/DL (ref 0.76–1.27)
DEPRECATED RDW RBC AUTO: 53.9 FL (ref 37–54)
ERYTHROCYTE [DISTWIDTH] IN BLOOD BY AUTOMATED COUNT: 14.7 % (ref 11.5–14.5)
GFR SERPL CREATININE-BSD FRML MDRD: 105 ML/MIN/1.73
GLUCOSE BLD-MCNC: 107 MG/DL (ref 65–99)
HCT VFR BLD AUTO: 35.1 % (ref 40.4–52.2)
HGB BLD-MCNC: 11.6 G/DL (ref 13.7–17.6)
MCH RBC QN AUTO: 33.4 PG (ref 27–32.7)
MCHC RBC AUTO-ENTMCNC: 33 G/DL (ref 32.6–36.4)
MCV RBC AUTO: 101.2 FL (ref 79.8–96.2)
PLATELET # BLD AUTO: 287 10*3/MM3 (ref 140–500)
PMV BLD AUTO: 10.4 FL (ref 6–12)
POTASSIUM BLD-SCNC: 4.1 MMOL/L (ref 3.5–5.2)
RBC # BLD AUTO: 3.47 10*6/MM3 (ref 4.6–6)
SODIUM BLD-SCNC: 145 MMOL/L (ref 136–145)
WBC NRBC COR # BLD: 6.59 10*3/MM3 (ref 4.5–10.7)

## 2017-06-26 PROCEDURE — 97535 SELF CARE MNGMENT TRAINING: CPT

## 2017-06-26 PROCEDURE — 0T9B70Z DRAINAGE OF BLADDER WITH DRAINAGE DEVICE, VIA NATURAL OR ARTIFICIAL OPENING: ICD-10-PCS | Performed by: PHYSICAL MEDICINE & REHABILITATION

## 2017-06-26 PROCEDURE — 85027 COMPLETE CBC AUTOMATED: CPT | Performed by: INTERNAL MEDICINE

## 2017-06-26 PROCEDURE — 92523 SPEECH SOUND LANG COMPREHEN: CPT

## 2017-06-26 PROCEDURE — 92526 ORAL FUNCTION THERAPY: CPT

## 2017-06-26 PROCEDURE — 80048 BASIC METABOLIC PNL TOTAL CA: CPT | Performed by: INTERNAL MEDICINE

## 2017-06-26 RX ORDER — MELATONIN
1000 2 TIMES DAILY
Status: DISCONTINUED | OUTPATIENT
Start: 2017-06-26 | End: 2017-07-28 | Stop reason: HOSPADM

## 2017-06-26 RX ORDER — ASPIRIN 325 MG
325 TABLET ORAL DAILY
Status: DISCONTINUED | OUTPATIENT
Start: 2017-06-27 | End: 2017-07-28 | Stop reason: HOSPADM

## 2017-06-26 RX ORDER — PHENYTOIN 125 MG/5ML
300 SUSPENSION ORAL DAILY
Status: CANCELLED | OUTPATIENT
Start: 2017-06-26

## 2017-06-26 RX ORDER — CLOPIDOGREL BISULFATE 75 MG/1
75 TABLET ORAL DAILY
Status: DISCONTINUED | OUTPATIENT
Start: 2017-06-27 | End: 2017-07-28 | Stop reason: HOSPADM

## 2017-06-26 RX ORDER — PHENYTOIN 125 MG/5ML
300 SUSPENSION ORAL DAILY
Status: DISCONTINUED | OUTPATIENT
Start: 2017-06-27 | End: 2017-07-10

## 2017-06-26 RX ORDER — HYDROCODONE BITARTRATE AND ACETAMINOPHEN 5; 325 MG/1; MG/1
1 TABLET ORAL EVERY 6 HOURS PRN
Status: DISCONTINUED | OUTPATIENT
Start: 2017-06-26 | End: 2017-06-26

## 2017-06-26 RX ORDER — TERAZOSIN 1 MG/1
1 CAPSULE ORAL NIGHTLY
Status: CANCELLED | OUTPATIENT
Start: 2017-06-26

## 2017-06-26 RX ORDER — TERAZOSIN 1 MG/1
1 CAPSULE ORAL NIGHTLY
Status: DISCONTINUED | OUTPATIENT
Start: 2017-06-26 | End: 2017-07-12

## 2017-06-26 RX ADMIN — TERAZOSIN HYDROCHLORIDE 1 MG: 1 CAPSULE ORAL at 20:57

## 2017-06-26 RX ADMIN — VITAMIN D, TAB 1000IU (100/BT) 1000 UNITS: 25 TAB at 08:36

## 2017-06-26 RX ADMIN — CLOPIDOGREL 75 MG: 75 TABLET, FILM COATED ORAL at 08:36

## 2017-06-26 RX ADMIN — ASPIRIN 325 MG: 325 TABLET ORAL at 08:36

## 2017-06-26 RX ADMIN — PHENYTOIN 300 MG: 125 SUSPENSION ORAL at 09:51

## 2017-06-26 RX ADMIN — VITAMIN D, TAB 1000IU (100/BT) 1000 UNITS: 25 TAB at 20:57

## 2017-06-26 NOTE — NURSING NOTE
requesting PT/OT notes from yesterday or today. Have PT notes and OT notified for update. Will send once available. Thanks, Danette PAREDES rehab admission nurse [phone]

## 2017-06-26 NOTE — THERAPY EVALUATION
"Acute Care - Speech Language Pathology Initial Evaluation  [facility]     Patient Name: Rafita Davis  : 1960  MRN: [id number]  Today's Date: 2017  Onset of Illness/Injury or Date of Surgery Date: 17            Admit Date: 2017     Speech-Language/Cognitive-Linguistic Evaluation  Subjective: The patient was seen on this date for a Speech-Language Evaluation and Cognitive-Linguistic Evaluation.  Patient was alert and cooperative.    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 Pt with PEG. NPO.    Objective: The patient was assessed utilizing informal assessment comprising portions of the [place] Test for Differential Diagnosis of Aphasia, the [place] Naming Test, the Mini Inventory of Right Brain Function, and informal measures due to inability to fully participate in a standardized assessment Findings were as follows:  Expressive language function was characterized by moderately impaired expressive language skills. Impairments were observed in repetition of unrelated word lists, repetition of phrases, repetition of sentences, production of phrases, production of sentences, production of simple conversation and expressing complex or abstract ideas.  100% confrontational naming, 0% responsive naming and cloze passages.   Receptive language function was characterized by moderately impaired receptive language skills.Impairments were observed in answering abstract yes/no questions and following 2-3 step directions.  100% acc single step commands, 0% acc two step commands, 100%  Acc simple y/n, 25% acc complex y/n.   Cognitive Linguistic function  Cognitive function was characterized by impaired cognitive-linguistic skills\".    Impairments were noted in attention, orientation, immediate memory, thought organization, problem solving, abstraction and math problems. Oriented to name and . Not oriented to place, month, or year. 0% acc with " basic math. Pt able to repeat 2/3 unrelated words.   Assessment: The patient demonstrated impaired speech-language and impaired cognition. Deficits were noted in receptive language, expressive language and cognition.   Patient also seen for dysphagia treatment. No overt s/s of asp with ice or thins via spoon this date. Pt exhibiting oral holding vs swallow delay with thins via cup and straw. Grimace noted with consecutive straw drinks and delayed throat clear noted with thins via straw. SLP recs free water protocol with ice and thins via spoon. RN was educated to discontinue free water if overt s/s of asp noted. Pt refused all puree trials this date by turning away from spoon.    Recommendations:   Cognitive-Linguistic Therapy.  Other Recommended Evaluations: Clinical Swallow Evaluation    Speech Therapy is recommended. Rationale to improve cognitive skills  It is anticipated patient will need continued speech-language pathology services at the next level of care.         Visit Dx:    ICD-10-CM ICD-9-CM   1. Decreased mobility R26.89 781.99     Patient Active Problem List   Diagnosis   • Subarachnoid bleed   • Subarachnoid hemorrhage   • Oropharyngeal dysphagia   • Seizure   • Protein-calorie malnutrition, moderate   • Urinary retention     Past Medical History:   Diagnosis Date   • Injury of back    • Kidney stones    • Stroke      Past Surgical History:   Procedure Laterality Date   • BACK SURGERY     • BRAIN SURGERY     • KIDNEY STONE SURGERY     •  SHUNT INSERTION            SLP EVALUATION (last 72 hours)      SLP Evaluation       06/26/17 1320                Clinical Impression    SLP Diagnosis Cognitive linguistic impairment  -SA        Prognosis good  -SA        Criteria for Skilled Therapeutic Interventions Met skilled criteria for cognitive linguistic intervention met;skilled criteria for speech language intervention met  -SA        Rehab Potential good, to achieve stated therapy goals  -SA        Therapy  Frequency PRN  -SA        Predicted Duration of Therapy Intervention (days/wks) until discharge  -SA        Anticipated Discharge Disposition inpatient rehabilitation facility  -SA        Pain Assessment    Pain Assessment Unable to assess  -SA        Communication Treatment Objective and Progress    Receptive Language Treatment Objectives Improve ability to follow directions  -SA        Expressive Treatment Objectives Improve word retrieval skills  -SA        Cognitive Linguistic Treatment Objectives Improve awareness of basic personal information;Improve memory skills;Improve orientation  -SA        Improve ability to follow directions    Improve ability to follow directions: two-step commands;multi-step command;demonstrate function of object;90%;with inconsistent cues  -SA        Improve word retrieval skills    Improve word retrieval skills by: completing open ended structured sentence;answer WH question with one word  -SA        Improve orientation    Improve orientation through: demonstrating orientation to day;demonstrating orientation to month;demonstrating orientation to year;demonstrating orientation to place;90%;with inconsistent cues  -SA        Improve memory skills    Improve memory skills through: recalling related word lists immediately;recalling unrelated word lists immediately;90%;with inconsistent cues  -SA          User Key  (r) = Recorded By, (t) = Taken By, (c) = Cosigned By    Initials Name Effective Dates     Emiliana Jordan 12/11/15 -            EDUCATION  The patient has been educated in the following areas:   Cognitive Impairment Communication Impairment.    SLP Recommendation and Plan  SLP Diagnosis: Cognitive linguistic impairment  Prognosis: good  Rehab Potential: good, to achieve stated therapy goals  Criteria for Skilled Therapeutic Interventions Met: skilled criteria for cognitive linguistic intervention met, skilled criteria for speech language intervention met  Anticipated  "Discharge Disposition: inpatient rehabilitation facility     Therapy Frequency: PRN  Predicted Duration of Therapy Intervention (days/wks): until discharge       Plan of Care Review  Plan Of Care Reviewed With: patient  Progress: no change  Outcome Summary/Follow up Plan: Pt nonverbal this AM, excluding one instance where he said, \"oh shit\" . Poor participation. No overt s/s of asp with ice or thins via spoon. SLP recs free water protocol with ice and thins via spoon.           IP SLP Goals       06/23/17 1457          Begin to Take Some PO Safely    Begin to Take Some PO Safely- SLP, Date Established 06/23/17  -NB      Begin to Take Some PO Safely- SLP, Time to Achieve by discharge  -NB      Begin to Take Some PO Safely- SLP, Outcome goal revised  -NB        User Key  (r) = Recorded By, (t) = Taken By, (c) = Cosigned By    Initials Name Provider Type    AMEE Kern MS CCC-SLP Speech and Language Pathologist             SLP Outcome Measures (last 72 hours)      SLP Outcome Measures       06/26/17 1300 06/23/17 1459       SLP Outcome Measures    Outcome Measure Used? Adult NOMS  -SA Adult NOMS  -NB     FCM Scores    FCM Chosen Swallowing  -SA Swallowing  -NB     Swallowing FCM Score 2  -SA 1  -NB       User Key  (r) = Recorded By, (t) = Taken By, (c) = Cosigned By    Initials Name Effective Dates    AMEE Kern MS CCC-SLP 04/13/15 -      Emiliana Jordan 12/11/15 -           Time Calculation:         Time Calculation- SLP       06/26/17 1324          Time Calculation- SLP    SLP Received On 06/26/17  -        User Key  (r) = Recorded By, (t) = Taken By, (c) = Cosigned By    Initials Name Provider Type     Emiliana Jordan Speech and Language Pathologist          Therapy Charges for Today     Code Description Service Date Service Provider Modifiers Qty    [id number]  ST EVAL SPEECH AND PROD W LANG  3 6/26/2017 Emiliana Jordan GN 1    [id number]  ST TREATMENT SWALLOW 1 6/26/2017 " Emiliana Jordan GN 1             ADULT NOMS (last 72 hours)      Adult NOMS       06/26/17 1300 06/23/17 1459             FCM Scores    FCM Chosen Swallowing  -SA Swallowing  -NB       Swallowing FCM Score 2  -SA 1  -NB         User Key  (r) = Recorded By, (t) = Taken By, (c) = Cosigned By    Initials Name Effective Dates    NB Nicolette Kern MS CCC-SLP 04/13/15 -     SA Emiliana Jordan 12/11/15 -                Emiliana Jordan  6/26/2017

## 2017-06-26 NOTE — PROGRESS NOTES
"     LOS: 5 days   Primary Care Physician: Jaz Devlin, DO     Subjective   Awake and alert.  Confused    Vital Signs  Body mass index is 17.96 kg/(m^2).  Temp:  [97.5 °F (36.4 °C)-98.4 °F (36.9 °C)] 97.5 °F (36.4 °C)  Heart Rate:  [75-80] 80  Resp:  [16-20] 16  BP: (105-119)/(68-82) 119/82      Objective:  General Appearance:  In no acute distress.    Vital signs: (most recent): Blood pressure 119/82, pulse 80, temperature 97.5 °F (36.4 °C), temperature source Oral, resp. rate 16, height 70\" (177.8 cm), weight 125 lb 3.2 oz (56.8 kg), SpO2 97 %.    Lungs:  There are decreased breath sounds.  No wheezes, rales or rhonchi.    Heart: Normal rate.  Regular rhythm.  No murmur.   Abdomen: Abdomen is soft and non-distended.  (G-tube dressing is dry)Bowel sounds are normal.   There is no abdominal tenderness.   There is no splenomegaly. There is no hepatomegaly.   Extremities: There is no dependent edema.    Neurological: Patient is alert.          Results Review:    I reviewed the patient's new clinical results.      Results from last 7 days  Lab Units 06/26/17  0710 06/24/17  1253   WBC 10*3/mm3 6.59 7.07   HEMOGLOBIN g/dL 11.6* 12.4*   PLATELETS 10*3/mm3 287 329       Results from last 7 days  Lab Units 06/26/17  0710 06/24/17  1253   SODIUM mmol/L 145 147*   POTASSIUM mmol/L 4.1 3.9   CHLORIDE mmol/L 102 105   TOTAL CO2 mmol/L 32.5* 29.0   BUN mg/dL 25* 24*   CREATININE mg/dL 0.76 0.68*   CALCIUM mg/dL 9.5 9.7   GLUCOSE mg/dL 107* 112*         Hemoglobin A1C:No results found for: HGBA1C    Glucose Range:No results found for: POCGLU    Lab Results   Component Value Date    [id number] 946 06/22/2017       No results found for: TSH    Assessment & Plan      Medication Review: Yes    Active Hospital Problems (** Indicates Principal Problem)    Diagnosis Date Noted   • **Seizure [R56.9] 06/21/2017   • Urinary retention [R33.9] 06/26/2017   • Subarachnoid hemorrhage [I60.9] 06/21/2017   • Oropharyngeal dysphagia " [R13.12] 06/21/2017   • Protein-calorie malnutrition, moderate [E44.0] 06/21/2017      Resolved Hospital Problems    Diagnosis Date Noted Date Resolved   • Somnolence [R40.0] 06/21/2017 06/26/2017       Assessment/Plan  1. 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 On Dilantin. Level still pending.  I spoke with lab.  Level was ordered as total and free so was sent out.  It should be back later today.  Repeat EEG 2-3 months per neuro.  Acute rehabilitation pending precert. Aspirin and Plavix ×3 months then just aspirin 81 mg daily  2. Previous bleeding around PEG, resolved  3. Urinary retention with Shanks in place. Voiding trial in a couple days per urology. Continue Flomax  4. SAH/intraventricular bleed with secondary oropharyngeal dysphagia.  PEG tube    Lashawn Perry MD  06/26/17  8:53 AM

## 2017-06-26 NOTE — PLAN OF CARE
Problem: Inpatient SLP  Goal: Dysphagia- Patient will improve swallowing skills to begin to take some PO safely    06/26/17 1419   Begin to Take Some PO Safely   Begin to Take Some PO Safely- SLP, Activity Level Patient will improve oral skills for more efficient eating   Begin to Take Some PO Safely- SLP, Outcome goal ongoing

## 2017-06-26 NOTE — PLAN OF CARE
Problem: Patient Care Overview (Adult)  Goal: Plan of Care Review  Outcome: Ongoing (interventions implemented as appropriate)    06/26/17 2707   Coping/Psychosocial Response Interventions   Plan Of Care Reviewed With patient   Patient Care Overview   Progress improving   Outcome Evaluation   Outcome Summary/Follow up Plan Pt alert and able to participate with OT today. Requires cues for task initiation and to stay on task. Will continue to benefit from OT for increasing independence and safety for adls

## 2017-06-26 NOTE — PLAN OF CARE
Problem: Nutrition, Enteral (Adult)  Goal: Signs and Symptoms of Listed Potential Problems Will be Absent or Manageable (Nutrition, Enteral)  Outcome: Ongoing (interventions implemented as appropriate)    Problem: Stroke (Hemorrhagic) (Adult)  Goal: Signs and Symptoms of Listed Potential Problems Will be Absent or Manageable (Stroke)  Outcome: Ongoing (interventions implemented as appropriate)    Problem: Urine Elimination, Impaired (Adult)  Goal: Effective Urinary Elimination  Outcome: Ongoing (interventions implemented as appropriate)  Goal: Effective Containment of Urine  Outcome: Ongoing (interventions implemented as appropriate)  Goal: Reduced Incontinence Episodes  Outcome: Ongoing (interventions implemented as appropriate)    Problem: Patient Care Overview (Adult)  Goal: Plan of Care Review  Outcome: Ongoing (interventions implemented as appropriate)    06/26/17 0317   Coping/Psychosocial Response Interventions   Plan Of Care Reviewed With patient   Patient Care Overview   Progress improving   Outcome Evaluation   Outcome Summary/Follow up Plan Alert to self. Denied pain with V/S WDL. F/C and FT both in place and patent w/o any complaint of abdominal discomfort, N/V/D. No more bleeding from FT site. Sleeping w/o SOA at rest.

## 2017-06-26 NOTE — THERAPY TREATMENT NOTE
Acute Care - Occupational Therapy Progress Note  [facility]     Patient Name: Rafita Davis  : 1960  MRN: [id number]  Today's Date: 2017  Onset of Illness/Injury or Date of Surgery Date: 17     Referring Physician: Lester      Admit Date: 2017    Visit Dx:     ICD-10-CM ICD-9-CM   1. Decreased mobility R26.89 781.99     Patient Active Problem List   Diagnosis   • Subarachnoid bleed   • Subarachnoid hemorrhage   • Oropharyngeal dysphagia   • Seizure   • Protein-calorie malnutrition, moderate   • Urinary retention             Adult Rehabilitation Note       17 1000 17 0945 17 1133    Rehab Assessment/Intervention    Discipline  occupational therapist  -SG physical therapist  -DM    Document Type  therapy note (daily note)  -SG therapy note (daily note)  -DM    Subjective Information  agree to therapy  -SG     Patient Effort, Rehab Treatment  good  -SG good  -DM    Symptoms Noted During/After Treatment   none  -DM    Precautions/Limitations  fall precautions  -SG fall precautions;other (see comments)   PEG  -DM    Specific Treatment Considerations   confusion  -DM    Recorded by  [SG] Susie Palma, OTR [DM] Cris Khan, PT    Vital Signs    Pre Patient Position   Supine  -DM    Intra Patient Position   Sitting  -DM    Post Patient Position   Supine  -DM    Recorded by   [DM] Cris Khan, PT    Pain Assessment    Pain Assessment  No/denies pain  -SG No/denies pain  -DM    Recorded by  [SG] Susie Palma, OTR [DM] Cris Khan, PT    Vision Assessment/Intervention    Visual Impairment  --   able to identify objects to left , right and center  -SG WFL  -DM    Recorded by  [SG] PRECIOUS Barnett [DM] Cris Khan, PT    Cognitive Assessment/Intervention    Current Cognitive/Communication Assessment   impaired  -DM    Orientation Status  oriented to;person  -SG oriented to;person  -DM    Follows Commands/Answers Questions  75% of the time;able to follow  single-step instructions;needs increased time;needs cueing;needs repetition   max cues to initiate   -SG 75% of the time;able to follow single-step instructions;needs cueing;needs increased time;needs repetition  -DM    Personal Safety   severe impairment  -DM    Personal Safety Interventions   fall prevention program maintained;muscle strengthening facilitated;nonskid shoes/slippers when out of bed;supervised activity  -DM    Recorded by  [SG] Susie Palma, OTR [DM] Cris Khan, PT    Bed Mobility, Assessment/Treatment    Bed Mobility, Assistive Device   bed rails  -DM    Bed Mob, Supine to Sit, [place]   verbal cues required;nonverbal cues required (demo/gesture);maximum assist (25% patient effort)  -DM    Bed Mob, Sit to Supine, [place]   maximum assist (25% patient effort);verbal cues required  -DM    Bed Mobility, Safety Issues   cognitive deficits limit understanding;decreased use of arms for pushing/pulling;decreased use of legs for bridging/pushing  -DM    Bed Mobility, Impairments   strength decreased;impaired balance  -DM    Recorded by   [DM] Cris Khan, PT    Transfer Assessment/Treatment    Transfers, Sit-Stand [place]   not tested  -DM    Transfers, Stand-Sit [place]   not tested  -DM    Recorded by   [DM] Cris Khan, PT    Gait Assessment/Treatment    Gait, [place] Level   not tested  -DM    Recorded by   [DM] Cris Khan, PT    Upper Body Bathing Assessment/Training    UB Bathing Assess/Train, Position  sitting  -SG     UB Bathing Assess/Train, [place] Level  minimum assist (75% patient effort)   max cues to initiate task and to stay on task  -SG     Recorded by  [SG] Susie Palma, OTR     Upper Body Dressing Assessment/Training    UB Dressing Assess/Train, Clothing Type  doffing:;donning:;hospital gown  -SG     UB Dressing Assess/Train, Position  sitting  -SG     UB Dressing Assess/Train, [place]  moderate assist (50% patient effort);verbal cues  required;nonverbal cues required (demo/gesture)  -SG     UB Dressing Assess/Train, Comment  cues to initiate task and continue with completion of task  -SG     Recorded by  [SG] Susie Palma OTR     Grooming Assessment/Training    Grooming Assess/Train, Indepen Level  nonverbal cues required (demo/gesture);verbal cues required;moderate assist (50% patient effort);hand over hand   initially requires hand over hand to initiate task.  -SG     Grooming Assess/Train, Impairments  --   pt completes task with cues and assist  -SG     Recorded by  [SG] Susie Palma OTR     Motor Skills/Interventions    Additional Documentation   Balance Skills Training (Group)  -DM    Recorded by   [DM] Cris Khan, PT    Balance Skills Training    Sitting-Level of Assistance   Close supervision  -DM    Sitting-Balance Support   Feet supported  -DM    Sitting-Balance Activities   Head control activities (Comment);Reaching for objects;Trunk control activities  -DM    Sitting # of Minutes   8  -DM    Recorded by   [DM] Cris Khan, PT    Therapy Exercises    Bilateral Lower Extremities   AAROM:;5 reps;LAQ;AROM:;10 reps;ankle pumps/circles  -DM    Bilateral Upper Extremity AROM:   follows directions to reach for items using BUE's  -SG  AROM:;5 reps;sitting;other reps   reaching to touch my hand 3 feet away,in center   -DM    Recorded by [SG] Susie Palma OTR  [DM] Cris Khan, PT    Sensory Assessment/Intervention    Sensory Impairment  --   difficult to fully assess. Pt grasp and holds wash cloth   -SG     Recorded by  [SG] Susie Palma OTR     Positioning and Restraints    Pre-Treatment Position  in bed  -SG in bed  -DM    Post Treatment Position  bed  -SG bed  -DM    In Bed  call light within reach;encouraged to call for assist;exit alarm on  -SG notified nsg;fowlers;call light within reach;encouraged to call for assist;exit alarm on;SCD pump applied   pillow under knees  -DM    Recorded by  [SG] Susie Palma OTR  [DM] Cris Khan, PT      06/24/17 1320 06/23/17 1400       Rehab Assessment/Intervention    Discipline physical therapy assistant  -JW --   PT Student  -EE,ALEXANDER,EE2     Document Type therapy note (daily note)  -JW therapy note (daily note)  -EE,ALEXANDER,EE2     Subjective Information --   confused  -JW agree to therapy;complains of;pain  -EE,ALEXANDER,EE2     Patient Effort, Rehab Treatment  fair  -EE,ALEXANDER,EE2     Precautions/Limitations fall precautions   PEG  -JW fall precautions  -EE,ALEXANDER,EE2     Recorded by [JW] Janie Kam PTA [EE,ALEXANDER,EE2] Yamini Zendejas, PT (r) Cecilio Vincent, PT Student (t) Yamini Zendejas PT (c)     Pain Assessment    Pain Assessment --   grimaces with bed mob, c/o right knee pain with standing  -JW Unable to assess   Reports pain in legs, unable to give number  -EE,ALEXANDER,EE2     Recorded by [] Janie Kam PTA [EE,ALEXANDER,EE2] Yamini Zendejas PT (r) Cecilio Vincent, PT Student (t) Yamini Zendejas PT (c)     Cognitive Assessment/Intervention    Current Cognitive/Communication Assessment impaired  - impaired  -EE,ALEXANDER,EE2     Orientation Status oriented to;person;disoriented to;place;time;situation  -JW oriented to;person;disoriented to;place;time;situation  -EE,ALEXANDER,EE2     Follows Commands/Answers Questions 25% of the time;needs cueing;needs increased time;needs repetition  -JW 50% of the time;able to follow single-step instructions;needs cueing;needs increased time;needs repetition  -EE,ALEXANDER,EE2     Personal Safety severe impairment  - severe impairment;decreased awareness, need for assist;decreased awareness, need for safety;decreased insight to deficits  -EE,ALEXANDER,EE2     Personal Safety Interventions fall prevention program maintained;gait belt  - fall prevention program maintained;muscle strengthening facilitated;nonskid shoes/slippers when out of bed;supervised activity  -EE,ALEXANDER,EE2     Recorded by [JW] Janie Kam PTA [EE,ALEXANDER,EE2] Yamini Zendejas, PT (r) Cecilio Vincent, PT Student (t) Yamini Zendejas PT (c)     MMT  (Manual Muscle Testing)    General MMT Assessment  upper extremity strength deficits identified  -EE,ALEXANDER,EE2     General MMT Assessment Detail  L biceps 4/5, L triceps 3+/5.  Difficult to assess due to confusion.  -EE,ALEXANDER,EE2     Recorded by  [EE,ALEXANDER,EE2] Yamini Zendejas, PT (r) Cecilio Vincent, PT Student (t) Yamini Zendejas, PT (c)     Bed Mobility, Assessment/Treatment    Bed Mob, Supine to Sit, [place] dependent (less than 25% patient effort);2 person assist required;verbal cues required;nonverbal cues required (demo/gesture)  -JW dependent (less than 25% patient effort);2 person assist required;verbal cues required;nonverbal cues required (demo/gesture)  -EE,ALEXANDER,EE2     Bed Mob, Sit to Supine, [place] dependent (less than 25% patient effort);2 person assist required;verbal cues required;nonverbal cues required (demo/gesture)  -JW dependent (less than 25% patient effort);2 person assist required;verbal cues required;nonverbal cues required (demo/gesture)  -EE,ALEXANDER,EE2     Bed Mobility, Safety Issues cognitive deficits limit understanding;decreased use of arms for pushing/pulling;decreased use of legs for bridging/pushing;impaired trunk control for bed mobility  - cognitive deficits limit understanding;decreased use of arms for pushing/pulling;decreased use of legs for bridging/pushing;impaired trunk control for bed mobility  -EE,ALEXANDER,EE2     Bed Mobility, Impairments strength decreased;impaired balance;coordination impaired  -JW strength decreased;impaired balance;coordination impaired  -EE,ALEXANDER,EE2     Recorded by [JW] Janie Kam, PTA [EE,ALEXANDER,EE2] Yamini Zendejas, PT (r) Cecilio Vincent, PT Student (t) Yamini Zendejas, PT (c)     Transfer Assessment/Treatment    Transfers, Bed-Chair [place]  unable to perform  -EE,ALEXANDER,EE2     Transfers, Sit-Stand [place] moderate assist (50% patient effort);maximum assist (25% patient effort);2 person assist required;verbal cues required;nonverbal cues required (demo/gesture);hand  held assist  -JW      Transfers, Stand-Sit [place] moderate assist (50% patient effort);2 person assist required  -JW      Recorded by [MARSHALL] Janie Kam PTA [EE,ALEXANDER,EE2] Yamini Zendejas PT (r) Cecilio Vincent, PT Student (t) Yamini Zendejas PT (c)     Gait Assessment/Treatment    Gait, [place] Level not tested   c/o knee pain  -JW unable to perform  -EE,ALEXANDER,EE2     Gait, Comment unable to wt shift in standing  -JW      Recorded by [MARSHALL] Janie Kam PTA [EE,ALEXANDER,EE2] Yamini Zendejas PT (r) Cecilio Vincent, PT Student (t) Yamini Zendejas PT (c)     Motor Skills/Interventions    Additional Documentation  Balance Skills Training (Group)  -EE,ALEXANDER,EE2     Recorded by  [EE,ALEXANDER,EE2] Yamini Zendejas PT (r) Cecilio Vincent, PT Student (t) Yamini Zendejas PT (c)     Balance Skills Training    Sitting-Level of Assistance Minimum assistance;Contact guard  -JW Minimum assistance  -EE,ALEXANDER,EE2     Sitting-Balance Support Right upper extremity supported;Left upper extremity supported;Feet supported  -JW Right upper extremity supported;Left upper extremity supported;Feet supported  -EE,ALEXANDER,EE2     Sitting-Balance Activities  Lateral lean;Forward lean;Reaching for objects;Reaching across midline;Right UE Weight Bearing;Left UE Weight Bearing;Trunk control activities  -EE,ALEXANDER,EE2     Sitting # of Minutes  5   Pt leans toward L side and lack orientation to midline  -EE,ALEXANDER,EE2     Recorded by [] Janie Kam PTA [EE,ALEXANDER,EE2] Yamini Zendejas PT (r) Cecilio Vincent, PT Student (t) Yamini Zendejas PT (c)     Therapy Exercises    Bilateral Lower Extremities AAROM:;5 reps;sitting;hip flexion;LAQ   Pt very confused and needs cues and assist for performance  -JW AAROM:;5 reps;sitting;hip flexion;LAQ   Pt very confused and needs cues and assist for performance  -EE,ALEXANDER,EE2     Recorded by [] Janie Kam PTA [EE,ALEXANDER,EE2] Yamini Zendejas PT (r) Cecilio Vincent, PT Student (t) Yamini Aashish, PT (c)     Positioning and Restraints    Pre-Treatment Position in bed   -JW in bed  -EE,ALEXANDER,EE2     Post Treatment Position bed  -JW bed  -EE,ALEXANDER,EE2     In Bed supine;call light within reach;encouraged to call for assist;exit alarm on  -JW call light within reach;encouraged to call for assist;with other staff;with family/caregiver   aide in room changing bed  -EE,ALEXANDER,EE2     Recorded by [JW] Janie Kam, PTA [EE,ALEXANDER,EE2] Yamini Zendejas, PT (r) Cecilio Vincent, PT Student (t) Yamini Zendejas, PT (c)       User Key  (r) = Recorded By, (t) = Taken By, (c) = Cosigned By    Initials Name Effective Dates     Susei Palma, OTR 04/13/15 -     MARY Khan, PT 10/06/15 -     EE Yamini Zendejas, PT 12/01/15 -     MARSHALL Kam, PTA 02/18/16 -     ALEXANDER Vincent, PT Student 05/08/17 -                 OT Goals       06/23/17 1327          Static Sitting Balance OT LTG    Static Sitting Balance OT LTG, Time to Achieve 1 wk  -SG      Static Sitting Balance OT LTG, [place] Level --   assess as appropriate to assist with adls  -SG      Follow Directions OT LTG    Follow Directions OT LTG, Time to Achieve 1 wk  -SG      Follow Directions OT LTG, Activity Type 1-Step;50% treatment session   to assist with adls  -SG      ADL OT LTG    ADL OT LTG, Time to Achieve 1 wk  -SG      ADL OT LTG, [place] Level max assist;max verbal cues  -SG        User Key  (r) = Recorded By, (t) = Taken By, (c) = Cosigned By    Initials Name Provider Type    PRECIOUS Quintanilla Occupational Therapist          Occupational Therapy Education     Title: PT OT SLP Therapies (Active)     Topic: Occupational Therapy (Active)     Point: ADL training (Active)    Description: Instruct learner(s) on proper safety adaptation and remediation techniques during self care or transfers.   Instruct in proper use of assistive devices.    Learning Progress Summary    Learner Readiness Method Response Comment Documented by Status   Patient Acceptance E,TB,D NR   06/26/17 0956 Active    Acceptance E NR   06/23/17 1325 Active                       User Key     Initials Effective Dates Name Provider Type Discipline     04/13/15 -  Susie Palma OTR Occupational Therapist OT                  OT Recommendation and Plan  Planned Therapy Interventions: ADL retraining, bed mobility training  Therapy Frequency: 3-5 times/wk  Plan of Care Review  Plan Of Care Reviewed With: patient  Progress: improving  Outcome Summary/Follow up Plan: Pt alert and able to participate with OT today. Requires cues for task initiation and to stay on task. Will continue to benefit from OT for increasing independence and safety for adls         Outcome Measures       06/26/17 1002 06/25/17 1200 06/24/17 1300    How much help from another person do you currently need...    Turning from your back to your side while in flat bed without using bedrails?  2  -DM 2  -JW    Moving from lying on back to sitting on the side of a flat bed without bedrails?  2  -DM 2  -JW    Moving to and from a bed to a chair (including a wheelchair)?  1  -DM 1  -JW    Standing up from a chair using your arms (e.g., wheelchair, bedside chair)?  2  -DM 2  -JW    Climbing 3-5 steps with a railing?  1  -DM 1  -JW    To walk in hospital room?  1  -DM 1  -JW    AM-PAC 6 Clicks Score  9  -DM 9  -JW    How much help from another is currently needed...    Putting on and taking off regular lower body clothing? 1  -SG      Bathing (including washing, rinsing, and drying) 2  -SG      Toileting (which includes using toilet bed pan or urinal) 2  -SG      Putting on and taking off regular upper body clothing 2  -SG      Taking care of personal grooming (such as brushing teeth) 3  -SG      Eating meals 1  -SG      Score 11  -SG      Functional Assessment    Outcome Measure Options  AM-PAC 6 Clicks Basic Mobility (PT)  -DM       06/23/17 1400 06/23/17 1329       How much help from another person do you currently need...    Turning from your back to your side while in flat bed without using bedrails? 2  -EE  (r) ALEXANDER (t) EE (c)      Moving from lying on back to sitting on the side of a flat bed without bedrails? 2  -EE (r) ALEXANDER (t) EE (c)      Moving to and from a bed to a chair (including a wheelchair)? 1  -EE (r) ALEXANDER (t) EE (c)      Standing up from a chair using your arms (e.g., wheelchair, bedside chair)? 1  -EE (r) ALEXANDER (t) EE (c)      Climbing 3-5 steps with a railing? 1  -EE (r) ALEXANDER (t) EE (c)      To walk in hospital room? 1  -EE (r) ALEXANDER (t) EE (c)      AM-PAC 6 Clicks Score 8  -EE (r) ALEXANDER (t)      How much help from another is currently needed...    Putting on and taking off regular lower body clothing?  1  -SG     Bathing (including washing, rinsing, and drying)  1  -SG     Toileting (which includes using toilet bed pan or urinal)  1  -SG     Putting on and taking off regular upper body clothing  1  -SG     Taking care of personal grooming (such as brushing teeth)  1  -SG     Eating meals  1  -SG     Score  6  -SG     Functional Assessment    Outcome Measure Options AM-PAC 6 Clicks Daily Activity (OT)  -EE (r) ALEXANDER (t) EE (c) AM-PAC 6 Clicks Daily Activity (OT)  -SG       User Key  (r) = Recorded By, (t) = Taken By, (c) = Cosigned By    Initials Name Provider Type     Susie Palma OTR Occupational Therapist    MARY Khan, PT Physical Therapist    EE Yamini Zendejas, PT Physical Therapist    MARSHALL Kam, PTA Physical Therapy Assistant    ALEXANDER Vincent, PT Student PT Student           Time Calculation:         Time Calculation- OT       06/26/17 1003          Time Calculation- OT    OT Start Time 0925  -SG      OT Stop Time 0945  -      OT Time Calculation (min) 20 min  -      OT Received On 06/26/17  -        User Key  (r) = Recorded By, (t) = Taken By, (c) = Cosigned By    Initials Name Provider Type     Susie Palma OTR Occupational Therapist           Therapy Charges for Today     Code Description Service Date Service Provider Modifiers Qty    [id number]  OT SELF CARE/MGMT/TRAIN EA 15  MIN 6/26/2017 Susie Palma, OTR GO 1               Susie Palma, OTR  6/26/2017

## 2017-06-26 NOTE — NURSING NOTE
Have precert for return to our acute rehab program. Please complete discharge/readmit tab in Epic for rehab orders. PADMINI Chow updated.Plan return today if MDs ready. Thanks, Danette RN rehab admission nurse [phone]

## 2017-06-26 NOTE — PAYOR COMM NOTE
"Gm Castillo (57 y.o. Male)     PLEASE SEE ATTACHED CLINICAL REVIEW  ON PATIENT. PT  REMAINS ON A MONITORED  TELEM FLOOR.    REF#[id number]    PLEASE CALL   OR  [phone]  WITH CONTINUED STAY AUTH AND DAYS APPROVED.  PT. IS AWAITING AUTH FOR [facility] ACUTE REHAB.       THANK YOU    LEDY LEE, LEA, CCP    Date of Birth Social Security Number Address Home Phone MRN    1960  [id number] [place] [address] [phone] [id number]    Holiness Marital Status          None        Admission Date Admission Type Admitting Provider Attending Provider Department, Room/Bed    6/21/17 Urgent Marleny Batista MD Hayden, Juliana, MD [address], 519/1    Discharge Date Discharge Disposition Discharge Destination         Rehab Facility or Unit ([facility])             Attending Provider: Lashawn Perry MD     Allergies:  No Known Allergies    Isolation:  None   Infection:  None   Code Status:  FULL    Ht:  70\" (177.8 cm)   Wt:  125 lb 3.2 oz (56.8 kg)    Admission Cmt:  None   Principal Problem:  Seizure [R56.9]                 Active Insurance as of 6/21/2017     Primary Coverage     Payor Plan Insurance Group Employer/Plan Group    HUMANA MEDICAID HUMANA CAR[employer] [facility]     Payor Plan Address Payor Plan Phone Number Effective From Effective To    PO  [phone] 6/16/2017     [place] [address]       Subscriber Name Subscriber Birth Date Member ID       GM CASTILLO 1960 [id number]                 Emergency Contacts      (Rel.) Home Phone Work Phone Mobile Phone    Nisreen Figueroa (Guardian) [phone] -- --              Intake & Output (last day)       06/25 0701 - 06/26 0700 06/26 0701 - 06/27 0700    Other 542 200    NG/GT 1049     Total Intake(mL/kg) 1591 (28) 200 (3.5)    Urine (mL/kg/hr) 500 (0.4)     Stool 0 (0)     Total Output 500      Net +1091 +200          Unmeasured Stool Occurrence 3 x 1 x    "     Lines, Drains & Airways    Active LDAs     Name:   Placement date:   Placement time:   Site:   Days:    Peripheral IV Line - Single Lumen 06/21/17 0830 median cubital vein (antecubital fossa), right 20 gauge  06/21/17    0830      5    Naso/Oral/Gastric Tube 06/20/17 1800  06/20/17    1800      5    Urethral Catheter 06/21/17 1430 16 10 10  06/21/17    1430      4                Hospital Medications (active)       Dose Frequency Start End    aspirin tablet 325 mg 325 mg Daily 6/22/2017     Sig - Route: 1 tablet by Per G Tube route Daily. - Per G Tube    cholecalciferol (VITAMIN D3) tablet 1,000 Units 1,000 Units 2 Times Daily 6/24/2017     Sig - Route: 1 tablet by Per G Tube route 2 (Two) Times a Day. - Per G Tube    clopidogrel (PLAVIX) tablet 75 mg 75 mg Daily 6/22/2017     Sig - Route: 1 tablet by Per G Tube route Daily. - Per G Tube    HYDROcodone-acetaminophen (NORCO) 5-325 MG per tablet 1 tablet 1 tablet Every 6 Hours PRN 6/21/2017 6/30/2017    Sig - Route: 1 tablet by Per G Tube route Every 6 (Six) Hours As Needed for Moderate Pain (4-6). - Per G Tube    ondansetron (ZOFRAN) injection 4 mg 4 mg Every 6 Hours PRN 6/21/2017     Sig - Route: Infuse 2 mL into a venous catheter Every 6 (Six) Hours As Needed for Nausea or Vomiting. - Intravenous    phenytoin (DILANTIN) 125 MG/5ML suspension 300 mg 300 mg Daily 6/24/2017     Sig - Route: 12 mL by Per G Tube route Daily. - Per G Tube    pneumococcal polysaccharide 23-valent (PNEUMOVAX-23) vaccine 0.5 mL 0.5 mL During Hospitalization 6/21/2017     Sig - Route: Inject 0.5 mL into the shoulder, thigh, or buttocks During Hospitalization for Immunization. - Intramuscular    Cosign for Ordering: Accepted by Marleny Batista MD on 6/23/2017  7:55 PM    sodium chloride 0.9 % flush 1-10 mL 1-10 mL As Needed 6/21/2017     Sig - Route: Infuse 1-10 mL into a venous catheter As Needed for Line Care. - Intravenous    terazosin (HYTRIN) capsule 1 mg 1 mg Nightly 6/24/2017      Sig - Route: 1 capsule by Per G Tube route Every Night. - Per G Tube          Lab Results (last 24 hours)     Procedure Component Value Units Date/Time    CBC (No Diff) [[id number]]  (Abnormal) Collected:  06/26/17 0710    Specimen:  Blood Updated:  06/26/17 0742     WBC 6.59 10*3/mm3      RBC 3.47 (L) 10*6/mm3      Hemoglobin 11.6 (L) g/dL      Hematocrit 35.1 (L) %      .2 (H) fL      MCH 33.4 (H) pg      MCHC 33.0 g/dL      RDW 14.7 (H) %      RDW-SD 53.9 fl      MPV 10.4 fL      Platelets 287 10*3/mm3     Basic Metabolic Panel [[id number]]  (Abnormal) Collected:  06/26/17 0710    Specimen:  Blood Updated:  06/26/17 0818     Glucose 107 (H) mg/dL      BUN 25 (H) mg/dL      Creatinine 0.76 mg/dL      Sodium 145 mmol/L      Potassium 4.1 mmol/L      Chloride 102 mmol/L      CO2 32.5 (H) mmol/L      Calcium 9.5 mg/dL      eGFR Non African Amer 105 mL/min/1.73      BUN/Creatinine Ratio 32.9 (H)     Anion Gap 10.5 mmol/L     Narrative:       GFR Normal >60  Chronic Kidney Disease <60  Kidney Failure <15        Imaging Results (last 24 hours)     ** No results found for the last 24 hours. **        ECG/EMG Results (last 24 hours)     ** No results found for the last 24 hours. **        Orders (last 24 hrs)     Start     Ordered    06/26/17 0600  Basic Metabolic Panel  Morning Draw      06/25/17 1556    06/26/17 0600  CBC (No Diff)  Morning Draw      06/25/17 1556    06/25/17 1556  Jevity 1.2 (Standard With Fiber); Tube Feeding Type: Continuous; Continuous Tube Feeding Start Rate (mL/hr): 20; Then Advance Rate By (mL/hr): 10; Every __ Hours: 8; To Goal Rate of (mL/hr): 60; Patient is NPO (No Tray)  Diet Effective Now      06/25/17 1556    06/24/17 2100  terazosin (HYTRIN) capsule 1 mg  Nightly      06/24/17 0914    06/24/17 1200  cholecalciferol (VITAMIN D3) tablet 1,000 Units  2 Times Daily      06/24/17 0952    06/24/17 0900  phenytoin (DILANTIN) 125 MG/5ML suspension 300 mg  Daily      06/23/17 1324     06/22/17 0900  aspirin tablet 325 mg  Daily      06/21/17 1206    06/22/17 0900  clopidogrel (PLAVIX) tablet 75 mg  Daily      06/21/17 1206    06/21/17 1647  pneumococcal polysaccharide 23-valent (PNEUMOVAX-23) vaccine 0.5 mL  During Hospitalization      06/21/17 1647    06/21/17 1201  HYDROcodone-acetaminophen (NORCO) 5-325 MG per tablet 1 tablet  Every 6 Hours PRN      06/21/17 1206    06/21/17 1159  ondansetron (ZOFRAN) injection 4 mg  Every 6 Hours PRN      06/21/17 1200    06/21/17 1156  sodium chloride 0.9 % flush 1-10 mL  As Needed      06/21/17 1200    Unscheduled  Potassium  As Needed     Comments:  For Ventricular Arrhythmias    06/21/17 1200    Unscheduled  Magnesium  As Needed     Comments:  For Ventricular Arrhythmias    06/21/17 1200    Unscheduled  Troponin  As Needed     Comments:  For Chest Pain    06/21/17 1200    Unscheduled  Blood Gas, Arterial  As Needed     Comments:  Per O2 Policy  Notify Physician    06/21/17 1200    Unscheduled  Benjamin and Document Tube Depth (in cm)  As Needed      06/21/17 1200    Unscheduled  Oral Care  As Needed      06/21/17 1200    Unscheduled  Flush Feeding Tube With 30-50mL Water As Needed  As Needed      06/21/17 1200    Unscheduled  Residual Volume Assessment - Gastric Feedings  As Needed     Comments:  Measure Gastric Residual Volume if Patient Complains of Nausea, Abdominal Distention, Pain, Tenderness or Firmness  If Residual Volume Greater Than 500 mL, Re-Feed Amount That Was Removed, Hold Tube Feeding for 1 Hour & Recheck Residual.  Notify Provider if Second Residual Remains Greater Than 500 mL  Document Residual Volume, Amount Discarded & Appearance of Residual.    06/21/17 1200    Unscheduled  Benjamin and Document Tube Depth (in cm)  As Needed      06/21/17 1356    Unscheduled  Residual Volume Assessment - Gastric Feedings  As Needed     Comments:  Measure Gastric Residual Volume if Patient Complains of Nausea, Abdominal Distention, Pain, Tenderness or  Firmness  If Residual Volume Greater Than 500 mL, Re-Feed Amount That Was Removed, Hold Tube Feeding for 1 Hour & Recheck Residual.  Notify Provider if Second Residual Remains Greater Than 500 mL  Document Residual Volume, Amount Discarded & Appearance of Residual.    06/21/17 1356    Signed and Held  Full Code  Continuous      Signed and Held    Signed and Held  Place Sequential Compression Device  Once      Signed and Held    Signed and Held  Maintain Sequential Compression Device  Continuous      Signed and Held    Signed and Held  Notify Provider if ACLS Protocol Activated  Until Discontinued      Signed and Held    Signed and Held  Fall Precautions  Continuous      Signed and Held    Signed and Held  Maintain Sequential Compression Device  Continuous      Signed and Held    Signed and Held  Benjamin and Document Tube Depth (in cm)  As Needed      Signed and Held    Signed and Held  Oral Care  As Needed      Signed and Held    Signed and Held  Write Hang Time on Enteral Feeding Bag  Continuous     Comments:  Enteral Nutrition Bag May Hang for 24 Hours.   If Cans in Bags, Hang Time Limited to 4 Hours.    Signed and Held    Signed and Held  Notify Provider - Abdominal Discomfort, Pain or Distention, No Bowel Movement in 3 Days  Until Discontinued      Signed and Held    Signed and Held  Flush Feeding Tube With 30-50mL Water As Needed  As Needed      Signed and Held    Signed and Held  Residual Volume Assessment - Gastric Feedings  As Needed     Comments:  Measure Gastric Residual Volume if Patient Complains of Nausea, Abdominal Distention, Pain, Tenderness or Firmness  If Residual Volume Greater Than 500 mL, Re-Feed Amount That Was Removed, Hold Tube Feeding for 1 Hour & Recheck Residual.  Notify Provider if Second Residual Remains Greater Than 500 mL  Document Residual Volume, Amount Discarded & Appearance of Residual.    Signed and Held    Signed and Held  Swallow Precautions  Continuous     Comments:  Supervision  with cues to swallow.  90 degree hip flexion with all PO intake. Oral care.    Signed and Held    Signed and Held  Nursing Communication Follow up with Guero ORTIZ--[address] [facility] [address] [place] [address] [phone]  Continuous     Comments:  Follow up with Guero ORTIZ--[address] [facility] [address] [place] [address] [phone]    Signed and Held    Signed and Held  Tobacco Cessation Education  Once      Signed and Held    Signed and Held  aspirin tablet 325 mg  Daily      Signed and Held    Signed and Held  clopidogrel (PLAVIX) tablet 75 mg  Daily      Signed and Held    Signed and Held  HYDROcodone-acetaminophen (NORCO) 5-325 MG per tablet 1 tablet  Every 6 Hours PRN      Signed and Held    Signed and Held  Feeding Tube Care For PEG  Once      Signed and Held    Signed and Held  jevity 1.2 at 40 cc. Hr and advance to 60 cc/hr  Once     Comments:  jevity 1.2 at 40 cc. Hr and advance to 60 cc/hr    Signed and Held    Signed and Held  Benjamin and Document Tube Depth (in cm)  As Needed      Signed and Held    Signed and Held  Elevate Head Of Bed 30-45 Degrees  Continuous      Signed and Held    Signed and Held  Residual Volume Assessment - Gastric Feedings  As Needed     Comments:  Measure Gastric Residual Volume if Patient Complains of Nausea, Abdominal Distention, Pain, Tenderness or Firmness  If Residual Volume Greater Than 500 mL, Re-Feed Amount That Was Removed, Hold Tube Feeding for 1 Hour & Recheck Residual.  Notify Provider if Second Residual Remains Greater Than 500 mL  Document Residual Volume, Amount Discarded & Appearance of Residual.    Signed and Held    Signed and Held  Jevity 1.2 (Standard With Fiber); Tube Feeding Type: Continuous; Continuous Tube Feeding Start Rate (mL/hr): 20; Then Advance Rate By (mL/hr): 10; Every __ Hours: 8; To Goal Rate of (mL/hr): 60; Patient is NPO (No Tray)  Diet Effective Now,   Status:  Canceled      Signed and Held     Signed and Held  phenytoin (DILANTIN) 125 MG/5ML suspension 100 mg  3 Times Daily,   Status:  Canceled      Signed and Held    Signed and Held  Inpatient Consult to Rehab Admission  Once     Provider:  (Not yet assigned)    Signed and Held    Signed and Held  Inpatient Consult to General Surgery  Once     Specialty:  General Surgery  Provider:  Talha Zacarias MD    Signed and Held    Signed and Held  Inpatient Consult to Urology  Once     Specialty:  Urology  Provider:  Callum Sanderson Jr., MD    Signed and Held    Signed and Held  Insert Indwelling Urinary Catheter  Once      Signed and Held    Signed and Held  Assess Need for Indwelling Urinary Catheter - Follow Removal Protocol  Continuous     Comments:  Indwelling Urinary Catheter Removal Criteria  Discontinue Indwelling Urinary Catheter Unless One of the Following is Present  Urinary Retention or Obstruction  Chronic Shanks Catheter Use  End of Life  Critical Illness with Strict I/O   Tract or Abdominal Surgery  Stage 3/4 Sacral / Perineal Wound  Required Activity Restriction: Trauma  Required Activity Restriction: Spine Surgery  If Patient is Being Followed by Urology Contact Them PRIOR to Removal  Do Not Remove Indwelling Urinary Catheter Order is Present with a CLINICAL REASON to Maintain the Catheter. Provider is Required to Include a Clinical Reason to Maintain a Urinary Catheter    Chronic Shanks Catheter Use (Present on Admission)  Assess for Continued Need & Document Medical Necessity  If Infection is Suspected, Contact the Provider        Signed and Held    Signed and Held  Catheter Care  Every Shift      Signed and Held    Signed and Held  Oxygen Therapy-  Continuous      Signed and Held    Signed and Held  phenytoin (DILANTIN) 125 MG/5ML suspension 300 mg  Daily      Signed and Held    Signed and Held  terazosin (HYTRIN) capsule 1 mg  Nightly      Signed and Held    Signed and Held  Jevity 1.2 (Standard With Fiber); Tube Feeding Type:  "Continuous; Continuous Tube Feeding Start Rate (mL/hr): 20; Then Advance Rate By (mL/hr): 10; Every __ Hours: 8; To Goal Rate of (mL/hr): 60; Patient is NPO (No Tray)  Diet Effective Now      Signed and Held          Operative/Procedure Notes (last 24 hours) (Notes from 6/25/2017  9:59 AM through 6/26/2017  9:59 AM)     No notes of this type exist for this encounter.           Physician Progress Notes (last 24 hours) (Notes from 6/25/2017  9:59 AM through 6/26/2017  9:59 AM)      Josiah Cooper MD at 6/25/2017  4:46 PM  Version 1 of 1         G-tube site inspected and no signs of bleeding seen at this time.  Hemoglobin stable.  Tube is functioning well.  Will see on an as-needed basis.    Josiah Cooper MD  General and Endoscopic Surgery  [facility], operated by [facility]    [address], [address]  [place], [place], [address]  P: [phone]  F: [phone]         Electronically signed by Josiah Cooper MD at 6/25/2017  4:47 PM      Lashawn Perry MD at 6/25/2017  6:33 PM  Version 1 of 1              LOS: 4 days   Primary Care Physician: Jaz Devlin, DO     Subjective   Alert and awake although still confused.  I don't think he remembers me.  No complaints    Vital Signs  Body mass index is 17.65 kg/(m^2).  Temp:  [97.8 °F (36.6 °C)-98.4 °F (36.9 °C)] 98.4 °F (36.9 °C)  Heart Rate:  [71-79] 75  Resp:  [16] 16  BP: (105-129)/(69-83) 105/69      Objective:  General Appearance:  In no acute distress (Looks older than age.  Cooperative and calm).    Vital signs: (most recent): Blood pressure 105/69, pulse 75, temperature 98.4 °F (36.9 °C), temperature source Oral, resp. rate 16, height 70\" (177.8 cm), weight 123 lb (55.8 kg), SpO2 97 %.    Lungs:  There are decreased breath sounds.  No wheezes, rales or rhonchi.    Heart: Normal rate.  Regular rhythm.  No murmur.   Abdomen: Abdomen is soft and non-distended.  (Bandage over G-tube site which I did not remove)Bowel sounds are normal.   There is no abdominal " tenderness.   There is no splenomegaly. There is no hepatomegaly.   Extremities: There is no dependent edema.    Neurological: Patient is alert.  (Oriented to self).          Results Review:    I reviewed the patient's new clinical results.            Hemoglobin A1C:No results found for: HGBA1C    Glucose Range:No results found for: POCGLU    Lab Results   Component Value Date    TPIUBNZQ53 946 06/22/2017       No results found for: TSH    Assessment & Plan      Medication Review: Yes    Active Hospital Problems (** Indicates Principal Problem)    Diagnosis Date Noted   • **Seizure [R56.9] 06/21/2017   • Subarachnoid hemorrhage [I60.9] 06/21/2017   • Somnolence [R40.0] 06/21/2017   • Oropharyngeal dysphagia [R13.12] 06/21/2017   • Protein-calorie malnutrition, moderate [E44.0] 06/21/2017      Resolved Hospital Problems    Diagnosis Date Noted Date Resolved   No resolved problems to display.       Assessment/Plan  1.  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  On Dilantin.  Level from 2 days ago still pending.  Repeat EEG 2-3 months per neuro.  Acute rehabilitation pending precert.  Aspirin and Plavix ×3 months then just aspirin 81 mg daily  2.  Previous bleeding around PEG.  Dr. Cooper evaluated the patient-no bleeding at this time.  Hemoglobin okay yesterday.  3.  Urinary retention with Shanks in place.  Voiding trial in 3 days per urology.  Continue Flomax    Lashawn Perry MD  06/25/17  6:33 PM           Electronically signed by Lashawn Perry MD at 6/25/2017  6:37 PM      Lashawn Perry MD at 6/26/2017  8:43 AM  Version 1 of 1              LOS: 5 days   Primary Care Physician: Jaz Devlin, DO     Subjective   Awake and alert.  Confused    Vital Signs  Body mass index is 17.96 kg/(m^2).  Temp:  [97.5 °F (36.4 °C)-98.4 °F (36.9 °C)] 97.5 °F (36.4 °C)  Heart Rate:  [75-80] 80  Resp:  [16-20] 16  BP: (105-119)/(68-82) 119/82      Objective:  General Appearance:  In  "no acute distress.    Vital signs: (most recent): Blood pressure 119/82, pulse 80, temperature 97.5 °F (36.4 °C), temperature source Oral, resp. rate 16, height 70\" (177.8 cm), weight 125 lb 3.2 oz (56.8 kg), SpO2 97 %.    Lungs:  There are decreased breath sounds.  No wheezes, rales or rhonchi.    Heart: Normal rate.  Regular rhythm.  No murmur.   Abdomen: Abdomen is soft and non-distended.  (G-tube dressing is dry)Bowel sounds are normal.   There is no abdominal tenderness.   There is no splenomegaly. There is no hepatomegaly.   Extremities: There is no dependent edema.    Neurological: Patient is alert.          Results Review:    I reviewed the patient's new clinical results.            Hemoglobin A1C:No results found for: HGBA1C    Glucose Range:No results found for: POCGLU    Lab Results   Component Value Date    BYDHCAXA35 946 06/22/2017       No results found for: TSH    Assessment & Plan      Medication Review: Yes    Active Hospital Problems (** Indicates Principal Problem)    Diagnosis Date Noted   • **Seizure [R56.9] 06/21/2017   • Urinary retention [R33.9] 06/26/2017   • Subarachnoid hemorrhage [I60.9] 06/21/2017   • Oropharyngeal dysphagia [R13.12] 06/21/2017   • Protein-calorie malnutrition, moderate [E44.0] 06/21/2017      Resolved Hospital Problems    Diagnosis Date Noted Date Resolved   • Somnolence [R40.0] 06/21/2017 06/26/2017       Assessment/Plan  1. 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 On Dilantin. Level still pending.  I spoke with lab.  Level was ordered as total and free so was sent out.  It should be back later today.  Repeat EEG 2-3 months per neuro.  Acute rehabilitation pending precert. Aspirin and Plavix ×3 months then just aspirin 81 mg daily  2. Previous bleeding around PEG, resolved  3. Urinary retention with Shanks in place. Voiding trial in a couple days per urology. Continue Flomax  4. SAH/intraventricular bleed with secondary " oropharyngeal dysphagia.  PEG tube    Lashawn Perry MD  06/26/17  8:53 AM           Electronically signed by Lashawn Perry MD at 6/26/2017  8:56 AM

## 2017-06-27 LAB
PHENYTOIN FREE SERPL-MCNC: 0.5 UG/ML (ref 1–2)
PHENYTOIN SERPL-MCNC: 6 UG/ML (ref 10–20)

## 2017-06-27 PROCEDURE — 97535 SELF CARE MNGMENT TRAINING: CPT

## 2017-06-27 PROCEDURE — 97162 PT EVAL MOD COMPLEX 30 MIN: CPT

## 2017-06-27 PROCEDURE — 97110 THERAPEUTIC EXERCISES: CPT

## 2017-06-27 PROCEDURE — 97165 OT EVAL LOW COMPLEX 30 MIN: CPT

## 2017-06-27 PROCEDURE — 96125 COGNITIVE TEST BY HC PRO: CPT

## 2017-06-27 RX ADMIN — CLOPIDOGREL 75 MG: 75 TABLET, FILM COATED ORAL at 08:06

## 2017-06-27 RX ADMIN — PHENYTOIN 300 MG: 125 SUSPENSION ORAL at 08:05

## 2017-06-27 RX ADMIN — VITAMIN D, TAB 1000IU (100/BT) 1000 UNITS: 25 TAB at 08:06

## 2017-06-27 RX ADMIN — ASPIRIN 325 MG: 325 TABLET ORAL at 08:06

## 2017-06-27 RX ADMIN — TERAZOSIN HYDROCHLORIDE 1 MG: 1 CAPSULE ORAL at 20:12

## 2017-06-27 RX ADMIN — VITAMIN D, TAB 1000IU (100/BT) 1000 UNITS: 25 TAB at 17:30

## 2017-06-28 PROCEDURE — 97535 SELF CARE MNGMENT TRAINING: CPT

## 2017-06-28 PROCEDURE — 92610 EVALUATE SWALLOWING FUNCTION: CPT

## 2017-06-28 PROCEDURE — 97110 THERAPEUTIC EXERCISES: CPT

## 2017-06-28 PROCEDURE — 97532: CPT

## 2017-06-28 RX ORDER — ACETAMINOPHEN 325 MG/1
650 TABLET ORAL EVERY 6 HOURS PRN
Status: DISCONTINUED | OUTPATIENT
Start: 2017-06-28 | End: 2017-07-28 | Stop reason: HOSPADM

## 2017-06-28 RX ADMIN — ACETAMINOPHEN 650 MG: 325 TABLET ORAL at 09:35

## 2017-06-28 RX ADMIN — PHENYTOIN 300 MG: 125 SUSPENSION ORAL at 10:00

## 2017-06-28 RX ADMIN — ASPIRIN 325 MG: 325 TABLET ORAL at 08:20

## 2017-06-28 RX ADMIN — CLOPIDOGREL 75 MG: 75 TABLET, FILM COATED ORAL at 08:20

## 2017-06-28 RX ADMIN — VITAMIN D, TAB 1000IU (100/BT) 1000 UNITS: 25 TAB at 16:29

## 2017-06-28 RX ADMIN — AMANTADINE HYDROCHLORIDE 100 MG: 50 SOLUTION ORAL at 08:20

## 2017-06-28 RX ADMIN — VITAMIN D, TAB 1000IU (100/BT) 1000 UNITS: 25 TAB at 08:20

## 2017-06-28 RX ADMIN — TERAZOSIN HYDROCHLORIDE 1 MG: 1 CAPSULE ORAL at 19:55

## 2017-06-29 PROCEDURE — 97532: CPT

## 2017-06-29 PROCEDURE — 92526 ORAL FUNCTION THERAPY: CPT

## 2017-06-29 PROCEDURE — 97110 THERAPEUTIC EXERCISES: CPT

## 2017-06-29 PROCEDURE — 97535 SELF CARE MNGMENT TRAINING: CPT

## 2017-06-29 RX ADMIN — AMANTADINE HYDROCHLORIDE 100 MG: 50 SOLUTION ORAL at 08:04

## 2017-06-29 RX ADMIN — CLOPIDOGREL 75 MG: 75 TABLET, FILM COATED ORAL at 08:04

## 2017-06-29 RX ADMIN — VITAMIN D, TAB 1000IU (100/BT) 1000 UNITS: 25 TAB at 17:48

## 2017-06-29 RX ADMIN — VITAMIN D, TAB 1000IU (100/BT) 1000 UNITS: 25 TAB at 08:04

## 2017-06-29 RX ADMIN — ASPIRIN 325 MG: 325 TABLET ORAL at 08:04

## 2017-06-29 RX ADMIN — PHENYTOIN 300 MG: 125 SUSPENSION ORAL at 09:49

## 2017-06-30 PROCEDURE — 92526 ORAL FUNCTION THERAPY: CPT

## 2017-06-30 PROCEDURE — 97532: CPT

## 2017-06-30 PROCEDURE — 97535 SELF CARE MNGMENT TRAINING: CPT

## 2017-06-30 PROCEDURE — 97110 THERAPEUTIC EXERCISES: CPT

## 2017-06-30 RX ORDER — TAMSULOSIN HYDROCHLORIDE 0.4 MG/1
0.4 CAPSULE ORAL DAILY
Status: DISCONTINUED | OUTPATIENT
Start: 2017-06-30 | End: 2017-07-12

## 2017-06-30 RX ORDER — NYSTATIN 100000 [USP'U]/G
POWDER TOPICAL EVERY 12 HOURS SCHEDULED
Status: DISCONTINUED | OUTPATIENT
Start: 2017-06-30 | End: 2017-07-28 | Stop reason: HOSPADM

## 2017-06-30 RX ADMIN — TAMSULOSIN HYDROCHLORIDE 0.4 MG: 0.4 CAPSULE ORAL at 20:43

## 2017-06-30 RX ADMIN — ASPIRIN 325 MG: 325 TABLET ORAL at 08:59

## 2017-06-30 RX ADMIN — AMANTADINE HYDROCHLORIDE 100 MG: 50 SOLUTION ORAL at 09:01

## 2017-06-30 RX ADMIN — ACETAMINOPHEN 650 MG: 325 TABLET ORAL at 08:59

## 2017-06-30 RX ADMIN — TERAZOSIN HYDROCHLORIDE 1 MG: 1 CAPSULE ORAL at 01:06

## 2017-06-30 RX ADMIN — VITAMIN D, TAB 1000IU (100/BT) 1000 UNITS: 25 TAB at 08:59

## 2017-06-30 RX ADMIN — CLOPIDOGREL 75 MG: 75 TABLET, FILM COATED ORAL at 08:59

## 2017-06-30 RX ADMIN — NYSTATIN 1 APPLICATION: 100000 POWDER TOPICAL at 20:41

## 2017-06-30 RX ADMIN — TAMSULOSIN HYDROCHLORIDE 0.4 MG: 0.4 CAPSULE ORAL at 12:43

## 2017-06-30 RX ADMIN — PHENYTOIN 300 MG: 125 SUSPENSION ORAL at 10:04

## 2017-06-30 RX ADMIN — VITAMIN D, TAB 1000IU (100/BT) 1000 UNITS: 25 TAB at 17:09

## 2017-07-01 PROCEDURE — 92507 TX SP LANG VOICE COMM INDIV: CPT

## 2017-07-01 PROCEDURE — 97110 THERAPEUTIC EXERCISES: CPT | Performed by: PHYSICAL THERAPIST

## 2017-07-01 PROCEDURE — 92526 ORAL FUNCTION THERAPY: CPT

## 2017-07-01 PROCEDURE — 97535 SELF CARE MNGMENT TRAINING: CPT

## 2017-07-01 RX ADMIN — NYSTATIN: 100000 POWDER TOPICAL at 09:06

## 2017-07-01 RX ADMIN — AMANTADINE HYDROCHLORIDE 100 MG: 50 SOLUTION ORAL at 09:05

## 2017-07-01 RX ADMIN — NYSTATIN: 100000 POWDER TOPICAL at 20:37

## 2017-07-01 RX ADMIN — PHENYTOIN 300 MG: 125 SUSPENSION ORAL at 10:53

## 2017-07-01 RX ADMIN — CLOPIDOGREL 75 MG: 75 TABLET, FILM COATED ORAL at 09:05

## 2017-07-01 RX ADMIN — AMANTADINE HYDROCHLORIDE 100 MG: 50 SOLUTION ORAL at 12:40

## 2017-07-01 RX ADMIN — ASPIRIN 325 MG: 325 TABLET ORAL at 09:06

## 2017-07-01 RX ADMIN — VITAMIN D, TAB 1000IU (100/BT) 1000 UNITS: 25 TAB at 09:05

## 2017-07-01 RX ADMIN — VITAMIN D, TAB 1000IU (100/BT) 1000 UNITS: 25 TAB at 18:03

## 2017-07-01 RX ADMIN — TAMSULOSIN HYDROCHLORIDE 0.4 MG: 0.4 CAPSULE ORAL at 09:06

## 2017-07-02 RX ADMIN — CLOPIDOGREL 75 MG: 75 TABLET, FILM COATED ORAL at 08:33

## 2017-07-02 RX ADMIN — AMANTADINE HYDROCHLORIDE 100 MG: 50 SOLUTION ORAL at 12:12

## 2017-07-02 RX ADMIN — NYSTATIN: 100000 POWDER TOPICAL at 08:33

## 2017-07-02 RX ADMIN — VITAMIN D, TAB 1000IU (100/BT) 1000 UNITS: 25 TAB at 08:33

## 2017-07-02 RX ADMIN — PHENYTOIN 300 MG: 125 SUSPENSION ORAL at 10:35

## 2017-07-02 RX ADMIN — TAMSULOSIN HYDROCHLORIDE 0.4 MG: 0.4 CAPSULE ORAL at 08:33

## 2017-07-02 RX ADMIN — AMANTADINE HYDROCHLORIDE 100 MG: 50 SOLUTION ORAL at 08:33

## 2017-07-02 RX ADMIN — NYSTATIN 1 APPLICATION: 100000 POWDER TOPICAL at 20:49

## 2017-07-02 RX ADMIN — ASPIRIN 325 MG: 325 TABLET ORAL at 08:33

## 2017-07-02 RX ADMIN — VITAMIN D, TAB 1000IU (100/BT) 1000 UNITS: 25 TAB at 17:52

## 2017-07-02 RX ADMIN — TERAZOSIN HYDROCHLORIDE 1 MG: 1 CAPSULE ORAL at 20:49

## 2017-07-03 LAB
ALBUMIN SERPL-MCNC: 3.6 G/DL (ref 3.5–5.2)
ALBUMIN/GLOB SERPL: 1.2 G/DL
ALP SERPL-CCNC: 84 U/L (ref 39–117)
ALT SERPL W P-5'-P-CCNC: 49 U/L (ref 1–41)
ANION GAP SERPL CALCULATED.3IONS-SCNC: 12.7 MMOL/L
AST SERPL-CCNC: 21 U/L (ref 1–40)
BASOPHILS # BLD AUTO: 0.05 10*3/MM3 (ref 0–0.2)
BASOPHILS NFR BLD AUTO: 0.9 % (ref 0–1.5)
BILIRUB SERPL-MCNC: 0.2 MG/DL (ref 0.1–1.2)
BUN BLD-MCNC: 22 MG/DL (ref 6–20)
BUN/CREAT SERPL: 25 (ref 7–25)
CALCIUM SPEC-SCNC: 9.4 MG/DL (ref 8.6–10.5)
CHLORIDE SERPL-SCNC: 98 MMOL/L (ref 98–107)
CO2 SERPL-SCNC: 28.3 MMOL/L (ref 22–29)
CREAT BLD-MCNC: 0.88 MG/DL (ref 0.76–1.27)
DEPRECATED RDW RBC AUTO: 53.9 FL (ref 37–54)
EOSINOPHIL # BLD AUTO: 0.29 10*3/MM3 (ref 0–0.7)
EOSINOPHIL NFR BLD AUTO: 5.3 % (ref 0.3–6.2)
ERYTHROCYTE [DISTWIDTH] IN BLOOD BY AUTOMATED COUNT: 15 % (ref 11.5–14.5)
GFR SERPL CREATININE-BSD FRML MDRD: 89 ML/MIN/1.73
GLOBULIN UR ELPH-MCNC: 3.1 GM/DL
GLUCOSE BLD-MCNC: 113 MG/DL (ref 65–99)
HCT VFR BLD AUTO: 33.9 % (ref 40.4–52.2)
HGB BLD-MCNC: 11.2 G/DL (ref 13.7–17.6)
IMM GRANULOCYTES # BLD: 0.02 10*3/MM3 (ref 0–0.03)
IMM GRANULOCYTES NFR BLD: 0.4 % (ref 0–0.5)
LYMPHOCYTES # BLD AUTO: 1.72 10*3/MM3 (ref 0.9–4.8)
LYMPHOCYTES NFR BLD AUTO: 31.3 % (ref 19.6–45.3)
MCH RBC QN AUTO: 33 PG (ref 27–32.7)
MCHC RBC AUTO-ENTMCNC: 33 G/DL (ref 32.6–36.4)
MCV RBC AUTO: 100 FL (ref 79.8–96.2)
MONOCYTES # BLD AUTO: 0.4 10*3/MM3 (ref 0.2–1.2)
MONOCYTES NFR BLD AUTO: 7.3 % (ref 5–12)
NEUTROPHILS # BLD AUTO: 3.02 10*3/MM3 (ref 1.9–8.1)
NEUTROPHILS NFR BLD AUTO: 54.8 % (ref 42.7–76)
PHENYTOIN SERPL-MCNC: 5.6 MCG/ML (ref 10–20)
PLATELET # BLD AUTO: 274 10*3/MM3 (ref 140–500)
PMV BLD AUTO: 10.1 FL (ref 6–12)
POTASSIUM BLD-SCNC: 4 MMOL/L (ref 3.5–5.2)
PROT SERPL-MCNC: 6.7 G/DL (ref 6–8.5)
RBC # BLD AUTO: 3.39 10*6/MM3 (ref 4.6–6)
SODIUM BLD-SCNC: 139 MMOL/L (ref 136–145)
WBC NRBC COR # BLD: 5.5 10*3/MM3 (ref 4.5–10.7)

## 2017-07-03 PROCEDURE — 92526 ORAL FUNCTION THERAPY: CPT

## 2017-07-03 PROCEDURE — 97532: CPT

## 2017-07-03 PROCEDURE — 80053 COMPREHEN METABOLIC PANEL: CPT | Performed by: PHYSICAL MEDICINE & REHABILITATION

## 2017-07-03 PROCEDURE — 85025 COMPLETE CBC W/AUTO DIFF WBC: CPT | Performed by: PHYSICAL MEDICINE & REHABILITATION

## 2017-07-03 PROCEDURE — 97110 THERAPEUTIC EXERCISES: CPT

## 2017-07-03 PROCEDURE — 97535 SELF CARE MNGMENT TRAINING: CPT

## 2017-07-03 PROCEDURE — 80185 ASSAY OF PHENYTOIN TOTAL: CPT | Performed by: PHYSICAL MEDICINE & REHABILITATION

## 2017-07-03 RX ORDER — PHENYTOIN 125 MG/5ML
100 SUSPENSION ORAL NIGHTLY
Status: DISCONTINUED | OUTPATIENT
Start: 2017-07-03 | End: 2017-07-10

## 2017-07-03 RX ADMIN — NYSTATIN: 100000 POWDER TOPICAL at 21:08

## 2017-07-03 RX ADMIN — PHENYTOIN 300 MG: 125 SUSPENSION ORAL at 09:13

## 2017-07-03 RX ADMIN — VITAMIN D, TAB 1000IU (100/BT) 1000 UNITS: 25 TAB at 07:43

## 2017-07-03 RX ADMIN — TERAZOSIN HYDROCHLORIDE 1 MG: 1 CAPSULE ORAL at 21:08

## 2017-07-03 RX ADMIN — AMANTADINE HYDROCHLORIDE 100 MG: 50 SOLUTION ORAL at 07:43

## 2017-07-03 RX ADMIN — VITAMIN D, TAB 1000IU (100/BT) 1000 UNITS: 25 TAB at 17:48

## 2017-07-03 RX ADMIN — ACETAMINOPHEN 650 MG: 325 TABLET ORAL at 21:08

## 2017-07-03 RX ADMIN — PHENYTOIN 100 MG: 125 SUSPENSION ORAL at 21:09

## 2017-07-03 RX ADMIN — AMANTADINE HYDROCHLORIDE 100 MG: 50 SOLUTION ORAL at 12:36

## 2017-07-03 RX ADMIN — ASPIRIN 325 MG: 325 TABLET ORAL at 07:43

## 2017-07-03 RX ADMIN — NYSTATIN: 100000 POWDER TOPICAL at 07:44

## 2017-07-03 RX ADMIN — TAMSULOSIN HYDROCHLORIDE 0.4 MG: 0.4 CAPSULE ORAL at 07:43

## 2017-07-03 RX ADMIN — CLOPIDOGREL 75 MG: 75 TABLET, FILM COATED ORAL at 07:43

## 2017-07-04 PROCEDURE — 97532: CPT

## 2017-07-04 PROCEDURE — 97535 SELF CARE MNGMENT TRAINING: CPT

## 2017-07-04 PROCEDURE — 97110 THERAPEUTIC EXERCISES: CPT

## 2017-07-04 RX ADMIN — ACETAMINOPHEN 650 MG: 325 TABLET ORAL at 21:05

## 2017-07-04 RX ADMIN — AMANTADINE HYDROCHLORIDE 100 MG: 50 SOLUTION ORAL at 09:03

## 2017-07-04 RX ADMIN — AMANTADINE HYDROCHLORIDE 100 MG: 50 SOLUTION ORAL at 12:21

## 2017-07-04 RX ADMIN — NYSTATIN: 100000 POWDER TOPICAL at 21:06

## 2017-07-04 RX ADMIN — VITAMIN D, TAB 1000IU (100/BT) 1000 UNITS: 25 TAB at 17:55

## 2017-07-04 RX ADMIN — CLOPIDOGREL 75 MG: 75 TABLET, FILM COATED ORAL at 09:03

## 2017-07-04 RX ADMIN — VITAMIN D, TAB 1000IU (100/BT) 1000 UNITS: 25 TAB at 09:03

## 2017-07-04 RX ADMIN — ACETAMINOPHEN 650 MG: 325 TABLET ORAL at 06:12

## 2017-07-04 RX ADMIN — PHENYTOIN 100 MG: 125 SUSPENSION ORAL at 21:05

## 2017-07-04 RX ADMIN — TAMSULOSIN HYDROCHLORIDE 0.4 MG: 0.4 CAPSULE ORAL at 09:03

## 2017-07-04 RX ADMIN — ASPIRIN 325 MG: 325 TABLET ORAL at 09:03

## 2017-07-04 RX ADMIN — NYSTATIN: 100000 POWDER TOPICAL at 09:09

## 2017-07-04 RX ADMIN — PHENYTOIN 300 MG: 125 SUSPENSION ORAL at 10:51

## 2017-07-04 RX ADMIN — TERAZOSIN HYDROCHLORIDE 1 MG: 1 CAPSULE ORAL at 21:05

## 2017-07-05 PROCEDURE — 97532: CPT

## 2017-07-05 PROCEDURE — 97535 SELF CARE MNGMENT TRAINING: CPT

## 2017-07-05 PROCEDURE — 97110 THERAPEUTIC EXERCISES: CPT

## 2017-07-05 RX ADMIN — VITAMIN D, TAB 1000IU (100/BT) 1000 UNITS: 25 TAB at 17:11

## 2017-07-05 RX ADMIN — NYSTATIN: 100000 POWDER TOPICAL at 20:26

## 2017-07-05 RX ADMIN — CLOPIDOGREL 75 MG: 75 TABLET, FILM COATED ORAL at 08:27

## 2017-07-05 RX ADMIN — TERAZOSIN HYDROCHLORIDE 1 MG: 1 CAPSULE ORAL at 20:26

## 2017-07-05 RX ADMIN — ASPIRIN 325 MG: 325 TABLET ORAL at 08:27

## 2017-07-05 RX ADMIN — AMANTADINE HYDROCHLORIDE 100 MG: 50 SOLUTION ORAL at 11:43

## 2017-07-05 RX ADMIN — NYSTATIN 1 APPLICATION: 100000 POWDER TOPICAL at 08:29

## 2017-07-05 RX ADMIN — VITAMIN D, TAB 1000IU (100/BT) 1000 UNITS: 25 TAB at 08:27

## 2017-07-05 RX ADMIN — PHENYTOIN 100 MG: 125 SUSPENSION ORAL at 20:26

## 2017-07-05 RX ADMIN — ACETAMINOPHEN 650 MG: 325 TABLET ORAL at 17:11

## 2017-07-05 RX ADMIN — AMANTADINE HYDROCHLORIDE 100 MG: 50 SOLUTION ORAL at 08:27

## 2017-07-05 RX ADMIN — PHENYTOIN 300 MG: 125 SUSPENSION ORAL at 10:20

## 2017-07-05 RX ADMIN — TAMSULOSIN HYDROCHLORIDE 0.4 MG: 0.4 CAPSULE ORAL at 08:27

## 2017-07-06 LAB — GLUCOSE BLDC GLUCOMTR-MCNC: 125 MG/DL (ref 70–130)

## 2017-07-06 PROCEDURE — 97110 THERAPEUTIC EXERCISES: CPT

## 2017-07-06 PROCEDURE — 97535 SELF CARE MNGMENT TRAINING: CPT

## 2017-07-06 PROCEDURE — 82962 GLUCOSE BLOOD TEST: CPT

## 2017-07-06 PROCEDURE — 97532: CPT

## 2017-07-06 PROCEDURE — 92526 ORAL FUNCTION THERAPY: CPT

## 2017-07-06 RX ADMIN — TERAZOSIN HYDROCHLORIDE 1 MG: 1 CAPSULE ORAL at 20:10

## 2017-07-06 RX ADMIN — ASPIRIN 325 MG: 325 TABLET ORAL at 08:33

## 2017-07-06 RX ADMIN — ACETAMINOPHEN 650 MG: 325 TABLET ORAL at 20:10

## 2017-07-06 RX ADMIN — PHENYTOIN 100 MG: 125 SUSPENSION ORAL at 20:10

## 2017-07-06 RX ADMIN — CLOPIDOGREL 75 MG: 75 TABLET, FILM COATED ORAL at 08:33

## 2017-07-06 RX ADMIN — NYSTATIN: 100000 POWDER TOPICAL at 22:01

## 2017-07-06 RX ADMIN — ACETAMINOPHEN 650 MG: 325 TABLET ORAL at 06:39

## 2017-07-06 RX ADMIN — PHENYTOIN 300 MG: 125 SUSPENSION ORAL at 08:32

## 2017-07-06 RX ADMIN — AMANTADINE HYDROCHLORIDE 100 MG: 50 SOLUTION ORAL at 12:46

## 2017-07-06 RX ADMIN — AMANTADINE HYDROCHLORIDE 100 MG: 50 SOLUTION ORAL at 08:32

## 2017-07-06 RX ADMIN — NYSTATIN: 100000 POWDER TOPICAL at 08:33

## 2017-07-06 RX ADMIN — VITAMIN D, TAB 1000IU (100/BT) 1000 UNITS: 25 TAB at 08:33

## 2017-07-06 RX ADMIN — TAMSULOSIN HYDROCHLORIDE 0.4 MG: 0.4 CAPSULE ORAL at 08:33

## 2017-07-06 RX ADMIN — VITAMIN D, TAB 1000IU (100/BT) 1000 UNITS: 25 TAB at 17:15

## 2017-07-07 PROCEDURE — 97110 THERAPEUTIC EXERCISES: CPT

## 2017-07-07 PROCEDURE — 97532: CPT

## 2017-07-07 PROCEDURE — 97535 SELF CARE MNGMENT TRAINING: CPT

## 2017-07-07 RX ADMIN — VITAMIN D, TAB 1000IU (100/BT) 1000 UNITS: 25 TAB at 18:28

## 2017-07-07 RX ADMIN — NYSTATIN: 100000 POWDER TOPICAL at 08:23

## 2017-07-07 RX ADMIN — VITAMIN D, TAB 1000IU (100/BT) 1000 UNITS: 25 TAB at 08:22

## 2017-07-07 RX ADMIN — CLOPIDOGREL 75 MG: 75 TABLET, FILM COATED ORAL at 08:22

## 2017-07-07 RX ADMIN — AMANTADINE HYDROCHLORIDE 100 MG: 50 SOLUTION ORAL at 12:35

## 2017-07-07 RX ADMIN — ASPIRIN 325 MG: 325 TABLET ORAL at 08:22

## 2017-07-07 RX ADMIN — TAMSULOSIN HYDROCHLORIDE 0.4 MG: 0.4 CAPSULE ORAL at 08:22

## 2017-07-07 RX ADMIN — ACETAMINOPHEN 650 MG: 325 TABLET ORAL at 21:18

## 2017-07-07 RX ADMIN — NYSTATIN: 100000 POWDER TOPICAL at 21:18

## 2017-07-07 RX ADMIN — PHENYTOIN 100 MG: 125 SUSPENSION ORAL at 21:17

## 2017-07-07 RX ADMIN — PHENYTOIN 300 MG: 125 SUSPENSION ORAL at 08:22

## 2017-07-07 RX ADMIN — TERAZOSIN HYDROCHLORIDE 1 MG: 1 CAPSULE ORAL at 21:18

## 2017-07-07 RX ADMIN — AMANTADINE HYDROCHLORIDE 100 MG: 50 SOLUTION ORAL at 08:23

## 2017-07-08 PROCEDURE — 97110 THERAPEUTIC EXERCISES: CPT

## 2017-07-08 RX ADMIN — NYSTATIN: 100000 POWDER TOPICAL at 08:50

## 2017-07-08 RX ADMIN — PHENYTOIN 300 MG: 125 SUSPENSION ORAL at 08:49

## 2017-07-08 RX ADMIN — TAMSULOSIN HYDROCHLORIDE 0.4 MG: 0.4 CAPSULE ORAL at 08:50

## 2017-07-08 RX ADMIN — NYSTATIN: 100000 POWDER TOPICAL at 21:56

## 2017-07-08 RX ADMIN — AMANTADINE HYDROCHLORIDE 100 MG: 50 SOLUTION ORAL at 12:06

## 2017-07-08 RX ADMIN — PHENYTOIN 100 MG: 125 SUSPENSION ORAL at 22:53

## 2017-07-08 RX ADMIN — TERAZOSIN HYDROCHLORIDE 1 MG: 1 CAPSULE ORAL at 21:56

## 2017-07-08 RX ADMIN — VITAMIN D, TAB 1000IU (100/BT) 1000 UNITS: 25 TAB at 17:03

## 2017-07-08 RX ADMIN — ASPIRIN 325 MG: 325 TABLET ORAL at 08:50

## 2017-07-08 RX ADMIN — VITAMIN D, TAB 1000IU (100/BT) 1000 UNITS: 25 TAB at 08:50

## 2017-07-08 RX ADMIN — AMANTADINE HYDROCHLORIDE 100 MG: 50 SOLUTION ORAL at 08:50

## 2017-07-08 RX ADMIN — CLOPIDOGREL 75 MG: 75 TABLET, FILM COATED ORAL at 08:50

## 2017-07-09 RX ADMIN — TAMSULOSIN HYDROCHLORIDE 0.4 MG: 0.4 CAPSULE ORAL at 08:04

## 2017-07-09 RX ADMIN — VITAMIN D, TAB 1000IU (100/BT) 1000 UNITS: 25 TAB at 17:56

## 2017-07-09 RX ADMIN — AMANTADINE HYDROCHLORIDE 100 MG: 50 SOLUTION ORAL at 11:59

## 2017-07-09 RX ADMIN — ACETAMINOPHEN 650 MG: 325 TABLET ORAL at 08:00

## 2017-07-09 RX ADMIN — AMANTADINE HYDROCHLORIDE 100 MG: 50 SOLUTION ORAL at 08:04

## 2017-07-09 RX ADMIN — NYSTATIN 1 APPLICATION: 100000 POWDER TOPICAL at 22:06

## 2017-07-09 RX ADMIN — VITAMIN D, TAB 1000IU (100/BT) 1000 UNITS: 25 TAB at 08:04

## 2017-07-09 RX ADMIN — PHENYTOIN 100 MG: 125 SUSPENSION ORAL at 22:06

## 2017-07-09 RX ADMIN — PHENYTOIN 300 MG: 125 SUSPENSION ORAL at 08:05

## 2017-07-09 RX ADMIN — ASPIRIN 325 MG: 325 TABLET ORAL at 08:04

## 2017-07-09 RX ADMIN — TERAZOSIN HYDROCHLORIDE 1 MG: 1 CAPSULE ORAL at 22:06

## 2017-07-09 RX ADMIN — NYSTATIN: 100000 POWDER TOPICAL at 08:05

## 2017-07-09 RX ADMIN — CLOPIDOGREL 75 MG: 75 TABLET, FILM COATED ORAL at 08:04

## 2017-07-10 LAB
ALBUMIN SERPL-MCNC: 3.5 G/DL (ref 3.5–5.2)
ALBUMIN/GLOB SERPL: 1.2 G/DL
ALP SERPL-CCNC: 89 U/L (ref 39–117)
ALT SERPL W P-5'-P-CCNC: 25 U/L (ref 1–41)
ANION GAP SERPL CALCULATED.3IONS-SCNC: 10.9 MMOL/L
AST SERPL-CCNC: 16 U/L (ref 1–40)
BASOPHILS # BLD AUTO: 0.08 10*3/MM3 (ref 0–0.2)
BASOPHILS NFR BLD AUTO: 0.7 % (ref 0–1.5)
BILIRUB SERPL-MCNC: 0.3 MG/DL (ref 0.1–1.2)
BUN BLD-MCNC: 19 MG/DL (ref 6–20)
BUN/CREAT SERPL: 23.8 (ref 7–25)
CALCIUM SPEC-SCNC: 9.1 MG/DL (ref 8.6–10.5)
CHLORIDE SERPL-SCNC: 99 MMOL/L (ref 98–107)
CO2 SERPL-SCNC: 29.1 MMOL/L (ref 22–29)
CREAT BLD-MCNC: 0.8 MG/DL (ref 0.76–1.27)
DEPRECATED RDW RBC AUTO: 61.3 FL (ref 37–54)
EOSINOPHIL # BLD AUTO: 0.32 10*3/MM3 (ref 0–0.7)
EOSINOPHIL NFR BLD AUTO: 2.7 % (ref 0.3–6.2)
ERYTHROCYTE [DISTWIDTH] IN BLOOD BY AUTOMATED COUNT: 15.9 % (ref 11.5–14.5)
GFR SERPL CREATININE-BSD FRML MDRD: 99 ML/MIN/1.73
GLOBULIN UR ELPH-MCNC: 2.9 GM/DL
GLUCOSE BLD-MCNC: 92 MG/DL (ref 65–99)
HCT VFR BLD AUTO: 39 % (ref 40.4–52.2)
HGB BLD-MCNC: 12.5 G/DL (ref 13.7–17.6)
IMM GRANULOCYTES # BLD: 0.1 10*3/MM3 (ref 0–0.03)
IMM GRANULOCYTES NFR BLD: 0.8 % (ref 0–0.5)
LYMPHOCYTES # BLD AUTO: 1.58 10*3/MM3 (ref 0.9–4.8)
LYMPHOCYTES NFR BLD AUTO: 13.4 % (ref 19.6–45.3)
MCH RBC QN AUTO: 33.5 PG (ref 27–32.7)
MCHC RBC AUTO-ENTMCNC: 32.1 G/DL (ref 32.6–36.4)
MCV RBC AUTO: 104.6 FL (ref 79.8–96.2)
MONOCYTES # BLD AUTO: 0.76 10*3/MM3 (ref 0.2–1.2)
MONOCYTES NFR BLD AUTO: 6.4 % (ref 5–12)
NEUTROPHILS # BLD AUTO: 8.99 10*3/MM3 (ref 1.9–8.1)
NEUTROPHILS NFR BLD AUTO: 76 % (ref 42.7–76)
NRBC BLD MANUAL-RTO: 0 /100 WBC (ref 0–0)
PHENYTOIN SERPL-MCNC: 5.6 MCG/ML (ref 10–20)
PLATELET # BLD AUTO: 358 10*3/MM3 (ref 140–500)
PMV BLD AUTO: 9.7 FL (ref 6–12)
POTASSIUM BLD-SCNC: 4.4 MMOL/L (ref 3.5–5.2)
PROT SERPL-MCNC: 6.4 G/DL (ref 6–8.5)
RBC # BLD AUTO: 3.73 10*6/MM3 (ref 4.6–6)
SODIUM BLD-SCNC: 139 MMOL/L (ref 136–145)
WBC NRBC COR # BLD: 11.83 10*3/MM3 (ref 4.5–10.7)

## 2017-07-10 PROCEDURE — 97110 THERAPEUTIC EXERCISES: CPT

## 2017-07-10 PROCEDURE — 80053 COMPREHEN METABOLIC PANEL: CPT | Performed by: PHYSICAL MEDICINE & REHABILITATION

## 2017-07-10 PROCEDURE — 85025 COMPLETE CBC W/AUTO DIFF WBC: CPT | Performed by: PHYSICAL MEDICINE & REHABILITATION

## 2017-07-10 PROCEDURE — 97532: CPT

## 2017-07-10 PROCEDURE — 80185 ASSAY OF PHENYTOIN TOTAL: CPT | Performed by: PHYSICAL MEDICINE & REHABILITATION

## 2017-07-10 PROCEDURE — 97535 SELF CARE MNGMENT TRAINING: CPT

## 2017-07-10 RX ORDER — PHENYTOIN 125 MG/5ML
200 SUSPENSION ORAL 2 TIMES DAILY
Status: DISCONTINUED | OUTPATIENT
Start: 2017-07-10 | End: 2017-07-13

## 2017-07-10 RX ADMIN — TERAZOSIN HYDROCHLORIDE 1 MG: 1 CAPSULE ORAL at 20:45

## 2017-07-10 RX ADMIN — ACETAMINOPHEN 650 MG: 325 TABLET ORAL at 09:14

## 2017-07-10 RX ADMIN — AMANTADINE HYDROCHLORIDE 100 MG: 50 SOLUTION ORAL at 08:27

## 2017-07-10 RX ADMIN — PHENYTOIN 200 MG: 125 SUSPENSION ORAL at 18:33

## 2017-07-10 RX ADMIN — AMANTADINE HYDROCHLORIDE 100 MG: 50 SOLUTION ORAL at 12:33

## 2017-07-10 RX ADMIN — NYSTATIN: 100000 POWDER TOPICAL at 20:45

## 2017-07-10 RX ADMIN — ASPIRIN 325 MG: 325 TABLET ORAL at 08:28

## 2017-07-10 RX ADMIN — CLOPIDOGREL 75 MG: 75 TABLET, FILM COATED ORAL at 08:28

## 2017-07-10 RX ADMIN — VITAMIN D, TAB 1000IU (100/BT) 1000 UNITS: 25 TAB at 08:28

## 2017-07-10 RX ADMIN — TAMSULOSIN HYDROCHLORIDE 0.4 MG: 0.4 CAPSULE ORAL at 08:28

## 2017-07-10 RX ADMIN — VITAMIN D, TAB 1000IU (100/BT) 1000 UNITS: 25 TAB at 18:33

## 2017-07-10 RX ADMIN — ACETAMINOPHEN 650 MG: 325 TABLET ORAL at 20:45

## 2017-07-10 RX ADMIN — NYSTATIN 1 APPLICATION: 100000 POWDER TOPICAL at 08:30

## 2017-07-10 RX ADMIN — PHENYTOIN 300 MG: 125 SUSPENSION ORAL at 08:27

## 2017-07-11 ENCOUNTER — APPOINTMENT (OUTPATIENT)
Dept: CT IMAGING | Facility: HOSPITAL | Age: 57
End: 2017-07-11

## 2017-07-11 PROCEDURE — 97110 THERAPEUTIC EXERCISES: CPT

## 2017-07-11 PROCEDURE — 97535 SELF CARE MNGMENT TRAINING: CPT

## 2017-07-11 PROCEDURE — 70450 CT HEAD/BRAIN W/O DYE: CPT

## 2017-07-11 PROCEDURE — 97532: CPT

## 2017-07-11 RX ADMIN — TAMSULOSIN HYDROCHLORIDE 0.4 MG: 0.4 CAPSULE ORAL at 08:15

## 2017-07-11 RX ADMIN — AMANTADINE HYDROCHLORIDE 100 MG: 50 SOLUTION ORAL at 11:50

## 2017-07-11 RX ADMIN — NYSTATIN: 100000 POWDER TOPICAL at 08:16

## 2017-07-11 RX ADMIN — VITAMIN D, TAB 1000IU (100/BT) 1000 UNITS: 25 TAB at 08:15

## 2017-07-11 RX ADMIN — AMANTADINE HYDROCHLORIDE 100 MG: 50 SOLUTION ORAL at 08:15

## 2017-07-11 RX ADMIN — TERAZOSIN HYDROCHLORIDE 1 MG: 1 CAPSULE ORAL at 21:14

## 2017-07-11 RX ADMIN — ACETAMINOPHEN 650 MG: 325 TABLET ORAL at 08:15

## 2017-07-11 RX ADMIN — NYSTATIN: 100000 POWDER TOPICAL at 21:14

## 2017-07-11 RX ADMIN — ASPIRIN 325 MG: 325 TABLET ORAL at 08:15

## 2017-07-11 RX ADMIN — PHENYTOIN 200 MG: 125 SUSPENSION ORAL at 08:15

## 2017-07-11 RX ADMIN — ACETAMINOPHEN 650 MG: 325 TABLET ORAL at 21:14

## 2017-07-11 RX ADMIN — PHENYTOIN 200 MG: 125 SUSPENSION ORAL at 17:12

## 2017-07-11 RX ADMIN — CLOPIDOGREL 75 MG: 75 TABLET, FILM COATED ORAL at 08:15

## 2017-07-11 RX ADMIN — VITAMIN D, TAB 1000IU (100/BT) 1000 UNITS: 25 TAB at 17:11

## 2017-07-12 LAB
BACTERIA UR QL AUTO: ABNORMAL /HPF
BILIRUB UR QL STRIP: NEGATIVE
CLARITY UR: ABNORMAL
COLOR UR: ABNORMAL
GLUCOSE UR STRIP-MCNC: NEGATIVE MG/DL
HGB UR QL STRIP.AUTO: ABNORMAL
HYALINE CASTS UR QL AUTO: ABNORMAL /LPF
KETONES UR QL STRIP: NEGATIVE
LEUKOCYTE ESTERASE UR QL STRIP.AUTO: ABNORMAL
NITRITE UR QL STRIP: NEGATIVE
PH UR STRIP.AUTO: 7 [PH] (ref 5–8)
PROT UR QL STRIP: ABNORMAL
RBC # UR: ABNORMAL /HPF
REF LAB TEST METHOD: ABNORMAL
SP GR UR STRIP: 1.02 (ref 1–1.03)
SQUAMOUS #/AREA URNS HPF: ABNORMAL /HPF
UROBILINOGEN UR QL STRIP: ABNORMAL
WBC UR QL AUTO: ABNORMAL /HPF

## 2017-07-12 PROCEDURE — 87086 URINE CULTURE/COLONY COUNT: CPT | Performed by: PHYSICAL MEDICINE & REHABILITATION

## 2017-07-12 PROCEDURE — 97110 THERAPEUTIC EXERCISES: CPT

## 2017-07-12 PROCEDURE — 81001 URINALYSIS AUTO W/SCOPE: CPT | Performed by: PHYSICAL MEDICINE & REHABILITATION

## 2017-07-12 PROCEDURE — 97535 SELF CARE MNGMENT TRAINING: CPT

## 2017-07-12 PROCEDURE — 87186 SC STD MICRODIL/AGAR DIL: CPT | Performed by: PHYSICAL MEDICINE & REHABILITATION

## 2017-07-12 PROCEDURE — 97532: CPT

## 2017-07-12 RX ORDER — TAMSULOSIN HYDROCHLORIDE 0.4 MG/1
0.8 CAPSULE ORAL DAILY
Status: DISCONTINUED | OUTPATIENT
Start: 2017-07-12 | End: 2017-07-28 | Stop reason: HOSPADM

## 2017-07-12 RX ORDER — SULFAMETHOXAZOLE AND TRIMETHOPRIM 800; 160 MG/1; MG/1
1 TABLET ORAL EVERY 12 HOURS SCHEDULED
Status: DISCONTINUED | OUTPATIENT
Start: 2017-07-12 | End: 2017-07-12

## 2017-07-12 RX ORDER — SULFAMETHOXAZOLE AND TRIMETHOPRIM 800; 160 MG/1; MG/1
1 TABLET ORAL EVERY 12 HOURS SCHEDULED
Status: COMPLETED | OUTPATIENT
Start: 2017-07-12 | End: 2017-07-16

## 2017-07-12 RX ADMIN — AMANTADINE HYDROCHLORIDE 100 MG: 50 SOLUTION ORAL at 12:16

## 2017-07-12 RX ADMIN — VITAMIN D, TAB 1000IU (100/BT) 1000 UNITS: 25 TAB at 08:29

## 2017-07-12 RX ADMIN — SULFAMETHOXAZOLE AND TRIMETHOPRIM 160 MG: 800; 160 TABLET ORAL at 17:55

## 2017-07-12 RX ADMIN — SULFAMETHOXAZOLE AND TRIMETHOPRIM 160 MG: 800; 160 TABLET ORAL at 21:03

## 2017-07-12 RX ADMIN — TAMSULOSIN HYDROCHLORIDE 0.8 MG: 0.4 CAPSULE ORAL at 08:26

## 2017-07-12 RX ADMIN — ACETAMINOPHEN 650 MG: 325 TABLET ORAL at 08:40

## 2017-07-12 RX ADMIN — CLOPIDOGREL 75 MG: 75 TABLET, FILM COATED ORAL at 08:29

## 2017-07-12 RX ADMIN — NYSTATIN: 100000 POWDER TOPICAL at 21:03

## 2017-07-12 RX ADMIN — PHENYTOIN 200 MG: 125 SUSPENSION ORAL at 17:56

## 2017-07-12 RX ADMIN — VITAMIN D, TAB 1000IU (100/BT) 1000 UNITS: 25 TAB at 17:55

## 2017-07-12 RX ADMIN — NYSTATIN: 100000 POWDER TOPICAL at 08:29

## 2017-07-12 RX ADMIN — ASPIRIN 325 MG: 325 TABLET ORAL at 08:29

## 2017-07-12 RX ADMIN — ACETAMINOPHEN 650 MG: 325 TABLET ORAL at 22:23

## 2017-07-12 RX ADMIN — AMANTADINE HYDROCHLORIDE 100 MG: 50 SOLUTION ORAL at 08:26

## 2017-07-12 RX ADMIN — PHENYTOIN 200 MG: 125 SUSPENSION ORAL at 08:26

## 2017-07-13 PROCEDURE — 97532: CPT

## 2017-07-13 PROCEDURE — 97110 THERAPEUTIC EXERCISES: CPT

## 2017-07-13 PROCEDURE — 97535 SELF CARE MNGMENT TRAINING: CPT | Performed by: OCCUPATIONAL THERAPIST

## 2017-07-13 PROCEDURE — 97112 NEUROMUSCULAR REEDUCATION: CPT | Performed by: OCCUPATIONAL THERAPIST

## 2017-07-13 RX ORDER — AMANTADINE HYDROCHLORIDE 100 MG/1
100 CAPSULE, GELATIN COATED ORAL 2 TIMES DAILY
Status: DISCONTINUED | OUTPATIENT
Start: 2017-07-13 | End: 2017-07-28 | Stop reason: HOSPADM

## 2017-07-13 RX ORDER — PHENYTOIN SODIUM 100 MG/1
200 CAPSULE, EXTENDED RELEASE ORAL EVERY 12 HOURS SCHEDULED
Status: DISCONTINUED | OUTPATIENT
Start: 2017-07-13 | End: 2017-07-24

## 2017-07-13 RX ADMIN — AMANTADINE HYDROCHLORIDE 100 MG: 100 CAPSULE ORAL at 11:43

## 2017-07-13 RX ADMIN — CLOPIDOGREL 75 MG: 75 TABLET, FILM COATED ORAL at 08:29

## 2017-07-13 RX ADMIN — SULFAMETHOXAZOLE AND TRIMETHOPRIM 160 MG: 800; 160 TABLET ORAL at 20:17

## 2017-07-13 RX ADMIN — PHENYTOIN SODIUM 200 MG: 100 CAPSULE, EXTENDED RELEASE ORAL at 20:17

## 2017-07-13 RX ADMIN — VITAMIN D, TAB 1000IU (100/BT) 1000 UNITS: 25 TAB at 17:13

## 2017-07-13 RX ADMIN — VITAMIN D, TAB 1000IU (100/BT) 1000 UNITS: 25 TAB at 08:29

## 2017-07-13 RX ADMIN — ASPIRIN 325 MG: 325 TABLET ORAL at 08:29

## 2017-07-13 RX ADMIN — AMANTADINE HYDROCHLORIDE 100 MG: 50 SOLUTION ORAL at 08:29

## 2017-07-13 RX ADMIN — NYSTATIN 1 APPLICATION: 100000 POWDER TOPICAL at 08:29

## 2017-07-13 RX ADMIN — PHENYTOIN 200 MG: 125 SUSPENSION ORAL at 08:29

## 2017-07-13 RX ADMIN — NYSTATIN: 100000 POWDER TOPICAL at 20:17

## 2017-07-13 RX ADMIN — SULFAMETHOXAZOLE AND TRIMETHOPRIM 160 MG: 800; 160 TABLET ORAL at 08:29

## 2017-07-13 RX ADMIN — TAMSULOSIN HYDROCHLORIDE 0.8 MG: 0.4 CAPSULE ORAL at 08:29

## 2017-07-14 LAB
ALBUMIN SERPL-MCNC: 3.8 G/DL (ref 3.5–5.2)
ALBUMIN/GLOB SERPL: 1.1 G/DL
ALP SERPL-CCNC: 103 U/L (ref 39–117)
ALT SERPL W P-5'-P-CCNC: 27 U/L (ref 1–41)
ANION GAP SERPL CALCULATED.3IONS-SCNC: 11.6 MMOL/L
AST SERPL-CCNC: 15 U/L (ref 1–40)
BACTERIA SPEC AEROBE CULT: ABNORMAL
BASOPHILS # BLD AUTO: 0.06 10*3/MM3 (ref 0–0.2)
BASOPHILS NFR BLD AUTO: 1 % (ref 0–1.5)
BILIRUB SERPL-MCNC: <0.2 MG/DL (ref 0.1–1.2)
BUN BLD-MCNC: 19 MG/DL (ref 6–20)
BUN/CREAT SERPL: 19 (ref 7–25)
CALCIUM SPEC-SCNC: 9.9 MG/DL (ref 8.6–10.5)
CHLORIDE SERPL-SCNC: 100 MMOL/L (ref 98–107)
CO2 SERPL-SCNC: 28.4 MMOL/L (ref 22–29)
CREAT BLD-MCNC: 1 MG/DL (ref 0.76–1.27)
DEPRECATED RDW RBC AUTO: 60.7 FL (ref 37–54)
EOSINOPHIL # BLD AUTO: 0.17 10*3/MM3 (ref 0–0.7)
EOSINOPHIL NFR BLD AUTO: 2.9 % (ref 0.3–6.2)
ERYTHROCYTE [DISTWIDTH] IN BLOOD BY AUTOMATED COUNT: 16.1 % (ref 11.5–14.5)
GFR SERPL CREATININE-BSD FRML MDRD: 77 ML/MIN/1.73
GLOBULIN UR ELPH-MCNC: 3.4 GM/DL
GLUCOSE BLD-MCNC: 99 MG/DL (ref 65–99)
HCT VFR BLD AUTO: 38.3 % (ref 40.4–52.2)
HGB BLD-MCNC: 12.4 G/DL (ref 13.7–17.6)
IMM GRANULOCYTES # BLD: 0.04 10*3/MM3 (ref 0–0.03)
IMM GRANULOCYTES NFR BLD: 0.7 % (ref 0–0.5)
LYMPHOCYTES # BLD AUTO: 1.21 10*3/MM3 (ref 0.9–4.8)
LYMPHOCYTES NFR BLD AUTO: 20.3 % (ref 19.6–45.3)
MCH RBC QN AUTO: 33.4 PG (ref 27–32.7)
MCHC RBC AUTO-ENTMCNC: 32.4 G/DL (ref 32.6–36.4)
MCV RBC AUTO: 103.2 FL (ref 79.8–96.2)
MONOCYTES # BLD AUTO: 0.59 10*3/MM3 (ref 0.2–1.2)
MONOCYTES NFR BLD AUTO: 9.9 % (ref 5–12)
NEUTROPHILS # BLD AUTO: 3.88 10*3/MM3 (ref 1.9–8.1)
NEUTROPHILS NFR BLD AUTO: 65.2 % (ref 42.7–76)
NRBC BLD MANUAL-RTO: 0 /100 WBC (ref 0–0)
PLATELET # BLD AUTO: 394 10*3/MM3 (ref 140–500)
PMV BLD AUTO: 9.3 FL (ref 6–12)
POTASSIUM BLD-SCNC: 4.4 MMOL/L (ref 3.5–5.2)
PROT SERPL-MCNC: 7.2 G/DL (ref 6–8.5)
RBC # BLD AUTO: 3.71 10*6/MM3 (ref 4.6–6)
SODIUM BLD-SCNC: 140 MMOL/L (ref 136–145)
WBC NRBC COR # BLD: 5.95 10*3/MM3 (ref 4.5–10.7)

## 2017-07-14 PROCEDURE — 97110 THERAPEUTIC EXERCISES: CPT

## 2017-07-14 PROCEDURE — 97532: CPT

## 2017-07-14 PROCEDURE — 97535 SELF CARE MNGMENT TRAINING: CPT

## 2017-07-14 PROCEDURE — 80053 COMPREHEN METABOLIC PANEL: CPT | Performed by: PHYSICAL MEDICINE & REHABILITATION

## 2017-07-14 PROCEDURE — 85025 COMPLETE CBC W/AUTO DIFF WBC: CPT | Performed by: PHYSICAL MEDICINE & REHABILITATION

## 2017-07-14 PROCEDURE — 92526 ORAL FUNCTION THERAPY: CPT

## 2017-07-14 RX ORDER — TAMSULOSIN HYDROCHLORIDE 0.4 MG/1
0.4 CAPSULE ORAL ONCE
Status: COMPLETED | OUTPATIENT
Start: 2017-07-14 | End: 2017-07-14

## 2017-07-14 RX ORDER — FAMOTIDINE 20 MG/1
20 TABLET, FILM COATED ORAL DAILY
Status: DISCONTINUED | OUTPATIENT
Start: 2017-07-14 | End: 2017-07-26

## 2017-07-14 RX ADMIN — PHENYTOIN SODIUM 200 MG: 100 CAPSULE, EXTENDED RELEASE ORAL at 08:59

## 2017-07-14 RX ADMIN — TAMSULOSIN HYDROCHLORIDE 0.4 MG: 0.4 CAPSULE ORAL at 11:58

## 2017-07-14 RX ADMIN — TAMSULOSIN HYDROCHLORIDE 0.8 MG: 0.4 CAPSULE ORAL at 08:59

## 2017-07-14 RX ADMIN — CLOPIDOGREL 75 MG: 75 TABLET, FILM COATED ORAL at 08:59

## 2017-07-14 RX ADMIN — VITAMIN D, TAB 1000IU (100/BT) 1000 UNITS: 25 TAB at 08:59

## 2017-07-14 RX ADMIN — NYSTATIN: 100000 POWDER TOPICAL at 11:57

## 2017-07-14 RX ADMIN — FAMOTIDINE 20 MG: 20 TABLET, FILM COATED ORAL at 13:45

## 2017-07-14 RX ADMIN — ASPIRIN 325 MG: 325 TABLET ORAL at 08:59

## 2017-07-14 RX ADMIN — AMANTADINE HYDROCHLORIDE 100 MG: 100 CAPSULE ORAL at 08:59

## 2017-07-14 RX ADMIN — NYSTATIN: 100000 POWDER TOPICAL at 22:46

## 2017-07-14 RX ADMIN — PHENYTOIN SODIUM 200 MG: 100 CAPSULE, EXTENDED RELEASE ORAL at 20:55

## 2017-07-14 RX ADMIN — SULFAMETHOXAZOLE AND TRIMETHOPRIM 160 MG: 800; 160 TABLET ORAL at 20:55

## 2017-07-14 RX ADMIN — VITAMIN D, TAB 1000IU (100/BT) 1000 UNITS: 25 TAB at 17:16

## 2017-07-14 RX ADMIN — ACETAMINOPHEN 650 MG: 325 TABLET ORAL at 06:10

## 2017-07-14 RX ADMIN — AMANTADINE HYDROCHLORIDE 100 MG: 100 CAPSULE ORAL at 11:58

## 2017-07-14 RX ADMIN — SULFAMETHOXAZOLE AND TRIMETHOPRIM 160 MG: 800; 160 TABLET ORAL at 08:59

## 2017-07-15 PROCEDURE — 97110 THERAPEUTIC EXERCISES: CPT

## 2017-07-15 RX ADMIN — TAMSULOSIN HYDROCHLORIDE 0.8 MG: 0.4 CAPSULE ORAL at 08:00

## 2017-07-15 RX ADMIN — PHENYTOIN SODIUM 200 MG: 100 CAPSULE, EXTENDED RELEASE ORAL at 08:01

## 2017-07-15 RX ADMIN — CLOPIDOGREL 75 MG: 75 TABLET, FILM COATED ORAL at 08:01

## 2017-07-15 RX ADMIN — SULFAMETHOXAZOLE AND TRIMETHOPRIM 160 MG: 800; 160 TABLET ORAL at 08:00

## 2017-07-15 RX ADMIN — NYSTATIN 1 APPLICATION: 100000 POWDER TOPICAL at 08:02

## 2017-07-15 RX ADMIN — PHENYTOIN SODIUM 200 MG: 100 CAPSULE, EXTENDED RELEASE ORAL at 20:53

## 2017-07-15 RX ADMIN — ACETAMINOPHEN 650 MG: 325 TABLET ORAL at 08:01

## 2017-07-15 RX ADMIN — VITAMIN D, TAB 1000IU (100/BT) 1000 UNITS: 25 TAB at 08:01

## 2017-07-15 RX ADMIN — AMANTADINE HYDROCHLORIDE 100 MG: 100 CAPSULE ORAL at 08:01

## 2017-07-15 RX ADMIN — SULFAMETHOXAZOLE AND TRIMETHOPRIM 160 MG: 800; 160 TABLET ORAL at 20:53

## 2017-07-15 RX ADMIN — AMANTADINE HYDROCHLORIDE 100 MG: 100 CAPSULE ORAL at 11:28

## 2017-07-15 RX ADMIN — ASPIRIN 325 MG: 325 TABLET ORAL at 08:01

## 2017-07-15 RX ADMIN — NYSTATIN: 100000 POWDER TOPICAL at 20:53

## 2017-07-15 RX ADMIN — FAMOTIDINE 20 MG: 20 TABLET, FILM COATED ORAL at 08:01

## 2017-07-15 RX ADMIN — VITAMIN D, TAB 1000IU (100/BT) 1000 UNITS: 25 TAB at 17:11

## 2017-07-16 RX ADMIN — SULFAMETHOXAZOLE AND TRIMETHOPRIM 160 MG: 800; 160 TABLET ORAL at 20:01

## 2017-07-16 RX ADMIN — AMANTADINE HYDROCHLORIDE 100 MG: 100 CAPSULE ORAL at 08:30

## 2017-07-16 RX ADMIN — SULFAMETHOXAZOLE AND TRIMETHOPRIM 160 MG: 800; 160 TABLET ORAL at 08:30

## 2017-07-16 RX ADMIN — VITAMIN D, TAB 1000IU (100/BT) 1000 UNITS: 25 TAB at 17:06

## 2017-07-16 RX ADMIN — NYSTATIN 1 APPLICATION: 100000 POWDER TOPICAL at 08:33

## 2017-07-16 RX ADMIN — ASPIRIN 325 MG: 325 TABLET ORAL at 08:31

## 2017-07-16 RX ADMIN — FAMOTIDINE 20 MG: 20 TABLET, FILM COATED ORAL at 08:30

## 2017-07-16 RX ADMIN — PHENYTOIN SODIUM 200 MG: 100 CAPSULE, EXTENDED RELEASE ORAL at 20:01

## 2017-07-16 RX ADMIN — TAMSULOSIN HYDROCHLORIDE 0.8 MG: 0.4 CAPSULE ORAL at 08:30

## 2017-07-16 RX ADMIN — VITAMIN D, TAB 1000IU (100/BT) 1000 UNITS: 25 TAB at 08:30

## 2017-07-16 RX ADMIN — ACETAMINOPHEN 650 MG: 325 TABLET ORAL at 20:01

## 2017-07-16 RX ADMIN — AMANTADINE HYDROCHLORIDE 100 MG: 100 CAPSULE ORAL at 11:29

## 2017-07-16 RX ADMIN — CLOPIDOGREL 75 MG: 75 TABLET, FILM COATED ORAL at 08:31

## 2017-07-16 RX ADMIN — NYSTATIN: 100000 POWDER TOPICAL at 20:01

## 2017-07-16 RX ADMIN — PHENYTOIN SODIUM 200 MG: 100 CAPSULE, EXTENDED RELEASE ORAL at 08:30

## 2017-07-17 LAB
ALBUMIN SERPL-MCNC: 3.7 G/DL (ref 3.5–5.2)
ALBUMIN/GLOB SERPL: 1.2 G/DL
ALP SERPL-CCNC: 96 U/L (ref 39–117)
ALT SERPL W P-5'-P-CCNC: 25 U/L (ref 1–41)
ANION GAP SERPL CALCULATED.3IONS-SCNC: 12 MMOL/L
AST SERPL-CCNC: 14 U/L (ref 1–40)
BASOPHILS # BLD AUTO: 0.11 10*3/MM3 (ref 0–0.2)
BASOPHILS NFR BLD AUTO: 1.9 % (ref 0–1.5)
BILIRUB SERPL-MCNC: 0.2 MG/DL (ref 0.1–1.2)
BUN BLD-MCNC: 18 MG/DL (ref 6–20)
BUN/CREAT SERPL: 19.4 (ref 7–25)
CALCIUM SPEC-SCNC: 9.4 MG/DL (ref 8.6–10.5)
CHLORIDE SERPL-SCNC: 96 MMOL/L (ref 98–107)
CO2 SERPL-SCNC: 28 MMOL/L (ref 22–29)
CREAT BLD-MCNC: 0.93 MG/DL (ref 0.76–1.27)
DEPRECATED RDW RBC AUTO: 58.7 FL (ref 37–54)
EOSINOPHIL # BLD AUTO: 0.24 10*3/MM3 (ref 0–0.7)
EOSINOPHIL NFR BLD AUTO: 4.2 % (ref 0.3–6.2)
ERYTHROCYTE [DISTWIDTH] IN BLOOD BY AUTOMATED COUNT: 15.6 % (ref 11.5–14.5)
GFR SERPL CREATININE-BSD FRML MDRD: 83 ML/MIN/1.73
GLOBULIN UR ELPH-MCNC: 3.1 GM/DL
GLUCOSE BLD-MCNC: 96 MG/DL (ref 65–99)
HCT VFR BLD AUTO: 40 % (ref 40.4–52.2)
HGB BLD-MCNC: 13 G/DL (ref 13.7–17.6)
IMM GRANULOCYTES # BLD: 0.04 10*3/MM3 (ref 0–0.03)
IMM GRANULOCYTES NFR BLD: 0.7 % (ref 0–0.5)
LYMPHOCYTES # BLD AUTO: 1.63 10*3/MM3 (ref 0.9–4.8)
LYMPHOCYTES NFR BLD AUTO: 28.5 % (ref 19.6–45.3)
MCH RBC QN AUTO: 33.6 PG (ref 27–32.7)
MCHC RBC AUTO-ENTMCNC: 32.5 G/DL (ref 32.6–36.4)
MCV RBC AUTO: 103.4 FL (ref 79.8–96.2)
MONOCYTES # BLD AUTO: 0.54 10*3/MM3 (ref 0.2–1.2)
MONOCYTES NFR BLD AUTO: 9.4 % (ref 5–12)
NEUTROPHILS # BLD AUTO: 3.16 10*3/MM3 (ref 1.9–8.1)
NEUTROPHILS NFR BLD AUTO: 55.3 % (ref 42.7–76)
PHENYTOIN SERPL-MCNC: 9 MCG/ML (ref 10–20)
PLATELET # BLD AUTO: 381 10*3/MM3 (ref 140–500)
PMV BLD AUTO: 9.4 FL (ref 6–12)
POTASSIUM BLD-SCNC: 4.1 MMOL/L (ref 3.5–5.2)
PROT SERPL-MCNC: 6.8 G/DL (ref 6–8.5)
RBC # BLD AUTO: 3.87 10*6/MM3 (ref 4.6–6)
SODIUM BLD-SCNC: 136 MMOL/L (ref 136–145)
WBC NRBC COR # BLD: 5.72 10*3/MM3 (ref 4.5–10.7)

## 2017-07-17 PROCEDURE — 97110 THERAPEUTIC EXERCISES: CPT

## 2017-07-17 PROCEDURE — 97535 SELF CARE MNGMENT TRAINING: CPT

## 2017-07-17 PROCEDURE — 97532: CPT

## 2017-07-17 PROCEDURE — 80185 ASSAY OF PHENYTOIN TOTAL: CPT | Performed by: PHYSICAL MEDICINE & REHABILITATION

## 2017-07-17 PROCEDURE — 85025 COMPLETE CBC W/AUTO DIFF WBC: CPT | Performed by: PHYSICAL MEDICINE & REHABILITATION

## 2017-07-17 PROCEDURE — 80053 COMPREHEN METABOLIC PANEL: CPT | Performed by: PHYSICAL MEDICINE & REHABILITATION

## 2017-07-17 RX ORDER — ONDANSETRON 4 MG/1
4 TABLET, FILM COATED ORAL EVERY 24 HOURS
Status: DISCONTINUED | OUTPATIENT
Start: 2017-07-18 | End: 2017-07-28 | Stop reason: HOSPADM

## 2017-07-17 RX ADMIN — AMANTADINE HYDROCHLORIDE 100 MG: 100 CAPSULE ORAL at 11:55

## 2017-07-17 RX ADMIN — TAMSULOSIN HYDROCHLORIDE 0.8 MG: 0.4 CAPSULE ORAL at 08:43

## 2017-07-17 RX ADMIN — VITAMIN D, TAB 1000IU (100/BT) 1000 UNITS: 25 TAB at 16:56

## 2017-07-17 RX ADMIN — VITAMIN D, TAB 1000IU (100/BT) 1000 UNITS: 25 TAB at 08:45

## 2017-07-17 RX ADMIN — NYSTATIN: 100000 POWDER TOPICAL at 08:47

## 2017-07-17 RX ADMIN — CLOPIDOGREL 75 MG: 75 TABLET, FILM COATED ORAL at 08:45

## 2017-07-17 RX ADMIN — FAMOTIDINE 20 MG: 20 TABLET, FILM COATED ORAL at 08:45

## 2017-07-17 RX ADMIN — ASPIRIN 325 MG: 325 TABLET ORAL at 08:45

## 2017-07-17 RX ADMIN — AMANTADINE HYDROCHLORIDE 100 MG: 100 CAPSULE ORAL at 08:46

## 2017-07-17 RX ADMIN — PHENYTOIN SODIUM 200 MG: 100 CAPSULE, EXTENDED RELEASE ORAL at 20:13

## 2017-07-17 RX ADMIN — PHENYTOIN SODIUM 200 MG: 100 CAPSULE, EXTENDED RELEASE ORAL at 08:43

## 2017-07-17 RX ADMIN — NYSTATIN: 100000 POWDER TOPICAL at 20:14

## 2017-07-17 RX ADMIN — ACETAMINOPHEN 650 MG: 325 TABLET ORAL at 05:53

## 2017-07-18 PROCEDURE — 97110 THERAPEUTIC EXERCISES: CPT

## 2017-07-18 PROCEDURE — 97535 SELF CARE MNGMENT TRAINING: CPT

## 2017-07-18 PROCEDURE — 97532: CPT

## 2017-07-18 RX ADMIN — FAMOTIDINE 20 MG: 20 TABLET, FILM COATED ORAL at 08:45

## 2017-07-18 RX ADMIN — VITAMIN D, TAB 1000IU (100/BT) 1000 UNITS: 25 TAB at 17:47

## 2017-07-18 RX ADMIN — AMANTADINE HYDROCHLORIDE 100 MG: 100 CAPSULE ORAL at 11:57

## 2017-07-18 RX ADMIN — PHENYTOIN SODIUM 200 MG: 100 CAPSULE, EXTENDED RELEASE ORAL at 20:43

## 2017-07-18 RX ADMIN — NYSTATIN: 100000 POWDER TOPICAL at 08:46

## 2017-07-18 RX ADMIN — AMANTADINE HYDROCHLORIDE 100 MG: 100 CAPSULE ORAL at 08:45

## 2017-07-18 RX ADMIN — ONDANSETRON 4 MG: 4 TABLET, FILM COATED ORAL at 06:56

## 2017-07-18 RX ADMIN — ASPIRIN 325 MG: 325 TABLET ORAL at 08:46

## 2017-07-18 RX ADMIN — TAMSULOSIN HYDROCHLORIDE 0.8 MG: 0.4 CAPSULE ORAL at 08:46

## 2017-07-18 RX ADMIN — CLOPIDOGREL 75 MG: 75 TABLET, FILM COATED ORAL at 08:46

## 2017-07-18 RX ADMIN — NYSTATIN: 100000 POWDER TOPICAL at 20:42

## 2017-07-18 RX ADMIN — VITAMIN D, TAB 1000IU (100/BT) 1000 UNITS: 25 TAB at 08:46

## 2017-07-18 RX ADMIN — ACETAMINOPHEN 650 MG: 325 TABLET ORAL at 09:37

## 2017-07-18 RX ADMIN — PHENYTOIN SODIUM 200 MG: 100 CAPSULE, EXTENDED RELEASE ORAL at 09:51

## 2017-07-19 PROCEDURE — 97535 SELF CARE MNGMENT TRAINING: CPT

## 2017-07-19 PROCEDURE — 97532: CPT

## 2017-07-19 PROCEDURE — 97110 THERAPEUTIC EXERCISES: CPT

## 2017-07-19 RX ORDER — POLYETHYLENE GLYCOL 3350 17 G/17G
17 POWDER, FOR SOLUTION ORAL DAILY PRN
Status: DISCONTINUED | OUTPATIENT
Start: 2017-07-19 | End: 2017-07-28 | Stop reason: HOSPADM

## 2017-07-19 RX ORDER — DOCUSATE SODIUM 100 MG/1
100 CAPSULE, LIQUID FILLED ORAL 2 TIMES DAILY PRN
Status: DISCONTINUED | OUTPATIENT
Start: 2017-07-19 | End: 2017-07-28 | Stop reason: HOSPADM

## 2017-07-19 RX ADMIN — NYSTATIN: 100000 POWDER TOPICAL at 23:32

## 2017-07-19 RX ADMIN — VITAMIN D, TAB 1000IU (100/BT) 1000 UNITS: 25 TAB at 08:23

## 2017-07-19 RX ADMIN — ASPIRIN 325 MG: 325 TABLET ORAL at 08:23

## 2017-07-19 RX ADMIN — PHENYTOIN SODIUM 200 MG: 100 CAPSULE, EXTENDED RELEASE ORAL at 08:21

## 2017-07-19 RX ADMIN — TAMSULOSIN HYDROCHLORIDE 0.8 MG: 0.4 CAPSULE ORAL at 08:22

## 2017-07-19 RX ADMIN — AMANTADINE HYDROCHLORIDE 100 MG: 100 CAPSULE ORAL at 12:11

## 2017-07-19 RX ADMIN — FAMOTIDINE 20 MG: 20 TABLET, FILM COATED ORAL at 08:21

## 2017-07-19 RX ADMIN — CLOPIDOGREL 75 MG: 75 TABLET, FILM COATED ORAL at 08:23

## 2017-07-19 RX ADMIN — AMANTADINE HYDROCHLORIDE 100 MG: 100 CAPSULE ORAL at 08:23

## 2017-07-19 RX ADMIN — ONDANSETRON 4 MG: 4 TABLET, FILM COATED ORAL at 06:27

## 2017-07-19 RX ADMIN — POLYETHYLENE GLYCOL 3350 17 G: 17 POWDER, FOR SOLUTION ORAL at 18:49

## 2017-07-19 RX ADMIN — NYSTATIN 1 APPLICATION: 100000 POWDER TOPICAL at 08:24

## 2017-07-19 RX ADMIN — VITAMIN D, TAB 1000IU (100/BT) 1000 UNITS: 25 TAB at 18:49

## 2017-07-20 PROCEDURE — 97532: CPT

## 2017-07-20 PROCEDURE — 97535 SELF CARE MNGMENT TRAINING: CPT

## 2017-07-20 PROCEDURE — 97110 THERAPEUTIC EXERCISES: CPT

## 2017-07-20 RX ORDER — BISACODYL 10 MG
10 SUPPOSITORY, RECTAL RECTAL DAILY PRN
Status: DISCONTINUED | OUTPATIENT
Start: 2017-07-20 | End: 2017-07-28 | Stop reason: HOSPADM

## 2017-07-20 RX ADMIN — PHENYTOIN SODIUM 200 MG: 100 CAPSULE, EXTENDED RELEASE ORAL at 00:10

## 2017-07-20 RX ADMIN — ONDANSETRON 4 MG: 4 TABLET, FILM COATED ORAL at 06:55

## 2017-07-20 RX ADMIN — VITAMIN D, TAB 1000IU (100/BT) 1000 UNITS: 25 TAB at 09:52

## 2017-07-20 RX ADMIN — TAMSULOSIN HYDROCHLORIDE 0.8 MG: 0.4 CAPSULE ORAL at 09:52

## 2017-07-20 RX ADMIN — ASPIRIN 325 MG: 325 TABLET ORAL at 09:52

## 2017-07-20 RX ADMIN — CLOPIDOGREL 75 MG: 75 TABLET, FILM COATED ORAL at 09:52

## 2017-07-20 RX ADMIN — NYSTATIN: 100000 POWDER TOPICAL at 22:09

## 2017-07-20 RX ADMIN — AMANTADINE HYDROCHLORIDE 100 MG: 100 CAPSULE ORAL at 09:52

## 2017-07-20 RX ADMIN — FAMOTIDINE 20 MG: 20 TABLET, FILM COATED ORAL at 09:52

## 2017-07-20 RX ADMIN — BISACODYL 10 MG: 10 SUPPOSITORY RECTAL at 15:51

## 2017-07-20 RX ADMIN — PHENYTOIN SODIUM 200 MG: 100 CAPSULE, EXTENDED RELEASE ORAL at 21:54

## 2017-07-20 RX ADMIN — AMANTADINE HYDROCHLORIDE 100 MG: 100 CAPSULE ORAL at 12:33

## 2017-07-20 RX ADMIN — PHENYTOIN SODIUM 200 MG: 100 CAPSULE, EXTENDED RELEASE ORAL at 09:52

## 2017-07-20 RX ADMIN — VITAMIN D, TAB 1000IU (100/BT) 1000 UNITS: 25 TAB at 17:44

## 2017-07-21 PROCEDURE — 97110 THERAPEUTIC EXERCISES: CPT

## 2017-07-21 PROCEDURE — 97535 SELF CARE MNGMENT TRAINING: CPT

## 2017-07-21 PROCEDURE — 97532: CPT

## 2017-07-21 RX ADMIN — CLOPIDOGREL 75 MG: 75 TABLET, FILM COATED ORAL at 08:24

## 2017-07-21 RX ADMIN — ONDANSETRON 4 MG: 4 TABLET, FILM COATED ORAL at 06:46

## 2017-07-21 RX ADMIN — NYSTATIN: 100000 POWDER TOPICAL at 08:25

## 2017-07-21 RX ADMIN — FAMOTIDINE 20 MG: 20 TABLET, FILM COATED ORAL at 08:24

## 2017-07-21 RX ADMIN — TAMSULOSIN HYDROCHLORIDE 0.8 MG: 0.4 CAPSULE ORAL at 08:24

## 2017-07-21 RX ADMIN — VITAMIN D, TAB 1000IU (100/BT) 1000 UNITS: 25 TAB at 08:23

## 2017-07-21 RX ADMIN — NYSTATIN: 100000 POWDER TOPICAL at 21:12

## 2017-07-21 RX ADMIN — PHENYTOIN SODIUM 200 MG: 100 CAPSULE, EXTENDED RELEASE ORAL at 21:12

## 2017-07-21 RX ADMIN — PHENYTOIN SODIUM 200 MG: 100 CAPSULE, EXTENDED RELEASE ORAL at 08:24

## 2017-07-21 RX ADMIN — VITAMIN D, TAB 1000IU (100/BT) 1000 UNITS: 25 TAB at 18:06

## 2017-07-21 RX ADMIN — ASPIRIN 325 MG: 325 TABLET ORAL at 08:24

## 2017-07-21 RX ADMIN — AMANTADINE HYDROCHLORIDE 100 MG: 100 CAPSULE ORAL at 08:24

## 2017-07-21 RX ADMIN — ACETAMINOPHEN 650 MG: 325 TABLET ORAL at 08:22

## 2017-07-21 RX ADMIN — AMANTADINE HYDROCHLORIDE 100 MG: 100 CAPSULE ORAL at 13:14

## 2017-07-22 PROCEDURE — 97110 THERAPEUTIC EXERCISES: CPT

## 2017-07-22 RX ADMIN — AMANTADINE HYDROCHLORIDE 100 MG: 100 CAPSULE ORAL at 11:20

## 2017-07-22 RX ADMIN — VITAMIN D, TAB 1000IU (100/BT) 1000 UNITS: 25 TAB at 17:56

## 2017-07-22 RX ADMIN — NYSTATIN 1 APPLICATION: 100000 POWDER TOPICAL at 08:40

## 2017-07-22 RX ADMIN — DOCUSATE SODIUM 100 MG: 100 CAPSULE, LIQUID FILLED ORAL at 11:20

## 2017-07-22 RX ADMIN — ASPIRIN 325 MG: 325 TABLET ORAL at 08:38

## 2017-07-22 RX ADMIN — PHENYTOIN SODIUM 200 MG: 100 CAPSULE, EXTENDED RELEASE ORAL at 20:30

## 2017-07-22 RX ADMIN — NYSTATIN: 100000 POWDER TOPICAL at 20:30

## 2017-07-22 RX ADMIN — FAMOTIDINE 20 MG: 20 TABLET, FILM COATED ORAL at 08:38

## 2017-07-22 RX ADMIN — TAMSULOSIN HYDROCHLORIDE 0.8 MG: 0.4 CAPSULE ORAL at 08:38

## 2017-07-22 RX ADMIN — ACETAMINOPHEN 650 MG: 325 TABLET ORAL at 08:44

## 2017-07-22 RX ADMIN — POLYETHYLENE GLYCOL 3350 17 G: 17 POWDER, FOR SOLUTION ORAL at 11:20

## 2017-07-22 RX ADMIN — CLOPIDOGREL 75 MG: 75 TABLET, FILM COATED ORAL at 08:38

## 2017-07-22 RX ADMIN — AMANTADINE HYDROCHLORIDE 100 MG: 100 CAPSULE ORAL at 08:38

## 2017-07-22 RX ADMIN — VITAMIN D, TAB 1000IU (100/BT) 1000 UNITS: 25 TAB at 08:38

## 2017-07-22 RX ADMIN — PHENYTOIN SODIUM 200 MG: 100 CAPSULE, EXTENDED RELEASE ORAL at 08:38

## 2017-07-23 RX ADMIN — FAMOTIDINE 20 MG: 20 TABLET, FILM COATED ORAL at 08:02

## 2017-07-23 RX ADMIN — NYSTATIN: 100000 POWDER TOPICAL at 21:02

## 2017-07-23 RX ADMIN — PHENYTOIN SODIUM 200 MG: 100 CAPSULE, EXTENDED RELEASE ORAL at 21:02

## 2017-07-23 RX ADMIN — CLOPIDOGREL 75 MG: 75 TABLET, FILM COATED ORAL at 08:02

## 2017-07-23 RX ADMIN — VITAMIN D, TAB 1000IU (100/BT) 1000 UNITS: 25 TAB at 08:02

## 2017-07-23 RX ADMIN — TAMSULOSIN HYDROCHLORIDE 0.8 MG: 0.4 CAPSULE ORAL at 08:02

## 2017-07-23 RX ADMIN — PHENYTOIN SODIUM 200 MG: 100 CAPSULE, EXTENDED RELEASE ORAL at 08:02

## 2017-07-23 RX ADMIN — AMANTADINE HYDROCHLORIDE 100 MG: 100 CAPSULE ORAL at 11:55

## 2017-07-23 RX ADMIN — ASPIRIN 325 MG: 325 TABLET ORAL at 08:02

## 2017-07-23 RX ADMIN — ACETAMINOPHEN 650 MG: 325 TABLET ORAL at 07:40

## 2017-07-23 RX ADMIN — ONDANSETRON 4 MG: 4 TABLET, FILM COATED ORAL at 05:36

## 2017-07-23 RX ADMIN — NYSTATIN: 100000 POWDER TOPICAL at 08:02

## 2017-07-23 RX ADMIN — VITAMIN D, TAB 1000IU (100/BT) 1000 UNITS: 25 TAB at 17:01

## 2017-07-23 RX ADMIN — AMANTADINE HYDROCHLORIDE 100 MG: 100 CAPSULE ORAL at 08:02

## 2017-07-24 ENCOUNTER — TELEPHONE (OUTPATIENT)
Dept: NEUROLOGY | Facility: CLINIC | Age: 57
End: 2017-07-24

## 2017-07-24 LAB
ALBUMIN SERPL-MCNC: 3.6 G/DL (ref 3.5–5.2)
ALBUMIN/GLOB SERPL: 1.3 G/DL
ALP SERPL-CCNC: 89 U/L (ref 39–117)
ALT SERPL W P-5'-P-CCNC: 32 U/L (ref 1–41)
ANION GAP SERPL CALCULATED.3IONS-SCNC: 11.8 MMOL/L
AST SERPL-CCNC: 11 U/L (ref 1–40)
BASOPHILS # BLD AUTO: 0.1 10*3/MM3 (ref 0–0.2)
BASOPHILS NFR BLD AUTO: 1.5 % (ref 0–1.5)
BILIRUB SERPL-MCNC: <0.2 MG/DL (ref 0.1–1.2)
BUN BLD-MCNC: 18 MG/DL (ref 6–20)
BUN/CREAT SERPL: 21.2 (ref 7–25)
CALCIUM SPEC-SCNC: 9.3 MG/DL (ref 8.6–10.5)
CHLORIDE SERPL-SCNC: 99 MMOL/L (ref 98–107)
CO2 SERPL-SCNC: 29.2 MMOL/L (ref 22–29)
CREAT BLD-MCNC: 0.85 MG/DL (ref 0.76–1.27)
DEPRECATED RDW RBC AUTO: 60.1 FL (ref 37–54)
EOSINOPHIL # BLD AUTO: 0.42 10*3/MM3 (ref 0–0.7)
EOSINOPHIL NFR BLD AUTO: 6.2 % (ref 0.3–6.2)
ERYTHROCYTE [DISTWIDTH] IN BLOOD BY AUTOMATED COUNT: 15.2 % (ref 11.5–14.5)
FOLATE SERPL-MCNC: 12.44 NG/ML (ref 4.78–24.2)
GFR SERPL CREATININE-BSD FRML MDRD: 93 ML/MIN/1.73
GLOBULIN UR ELPH-MCNC: 2.8 GM/DL
GLUCOSE BLD-MCNC: 108 MG/DL (ref 65–99)
HCT VFR BLD AUTO: 41.1 % (ref 40.4–52.2)
HGB BLD-MCNC: 12.9 G/DL (ref 13.7–17.6)
IMM GRANULOCYTES # BLD: 0.04 10*3/MM3 (ref 0–0.03)
IMM GRANULOCYTES NFR BLD: 0.6 % (ref 0–0.5)
LYMPHOCYTES # BLD AUTO: 1.77 10*3/MM3 (ref 0.9–4.8)
LYMPHOCYTES NFR BLD AUTO: 26 % (ref 19.6–45.3)
MCH RBC QN AUTO: 33.9 PG (ref 27–32.7)
MCHC RBC AUTO-ENTMCNC: 31.4 G/DL (ref 32.6–36.4)
MCV RBC AUTO: 108.2 FL (ref 79.8–96.2)
MONOCYTES # BLD AUTO: 0.59 10*3/MM3 (ref 0.2–1.2)
MONOCYTES NFR BLD AUTO: 8.7 % (ref 5–12)
NEUTROPHILS # BLD AUTO: 3.89 10*3/MM3 (ref 1.9–8.1)
NEUTROPHILS NFR BLD AUTO: 57 % (ref 42.7–76)
PHENYTOIN SERPL-MCNC: 10.2 MCG/ML (ref 10–20)
PLATELET # BLD AUTO: 315 10*3/MM3 (ref 140–500)
PMV BLD AUTO: 9.6 FL (ref 6–12)
POTASSIUM BLD-SCNC: 4.3 MMOL/L (ref 3.5–5.2)
PROT SERPL-MCNC: 6.4 G/DL (ref 6–8.5)
RBC # BLD AUTO: 3.8 10*6/MM3 (ref 4.6–6)
SODIUM BLD-SCNC: 140 MMOL/L (ref 136–145)
VIT B12 BLD-MCNC: 591 PG/ML (ref 211–946)
WBC NRBC COR # BLD: 6.81 10*3/MM3 (ref 4.5–10.7)

## 2017-07-24 PROCEDURE — 82746 ASSAY OF FOLIC ACID SERUM: CPT | Performed by: PHYSICAL MEDICINE & REHABILITATION

## 2017-07-24 PROCEDURE — 82607 VITAMIN B-12: CPT | Performed by: PHYSICAL MEDICINE & REHABILITATION

## 2017-07-24 PROCEDURE — 97535 SELF CARE MNGMENT TRAINING: CPT

## 2017-07-24 PROCEDURE — 85025 COMPLETE CBC W/AUTO DIFF WBC: CPT | Performed by: PHYSICAL MEDICINE & REHABILITATION

## 2017-07-24 PROCEDURE — 80053 COMPREHEN METABOLIC PANEL: CPT | Performed by: PHYSICAL MEDICINE & REHABILITATION

## 2017-07-24 PROCEDURE — 97110 THERAPEUTIC EXERCISES: CPT | Performed by: PHYSICAL THERAPIST

## 2017-07-24 PROCEDURE — 97532: CPT

## 2017-07-24 PROCEDURE — 97110 THERAPEUTIC EXERCISES: CPT

## 2017-07-24 PROCEDURE — 80185 ASSAY OF PHENYTOIN TOTAL: CPT | Performed by: PHYSICAL MEDICINE & REHABILITATION

## 2017-07-24 RX ORDER — LACOSAMIDE 100 MG/1
100 TABLET ORAL EVERY 12 HOURS SCHEDULED
Status: DISCONTINUED | OUTPATIENT
Start: 2017-07-31 | End: 2017-07-28 | Stop reason: HOSPADM

## 2017-07-24 RX ORDER — LACOSAMIDE 50 MG/1
50 TABLET ORAL EVERY 12 HOURS SCHEDULED
Status: DISCONTINUED | OUTPATIENT
Start: 2017-07-24 | End: 2017-07-28 | Stop reason: HOSPADM

## 2017-07-24 RX ADMIN — NYSTATIN 1 APPLICATION: 100000 POWDER TOPICAL at 21:52

## 2017-07-24 RX ADMIN — AMANTADINE HYDROCHLORIDE 100 MG: 100 CAPSULE ORAL at 11:45

## 2017-07-24 RX ADMIN — ONDANSETRON 4 MG: 4 TABLET, FILM COATED ORAL at 06:14

## 2017-07-24 RX ADMIN — LACOSAMIDE 50 MG: 50 TABLET, FILM COATED ORAL at 21:52

## 2017-07-24 RX ADMIN — TAMSULOSIN HYDROCHLORIDE 0.8 MG: 0.4 CAPSULE ORAL at 07:33

## 2017-07-24 RX ADMIN — NYSTATIN: 100000 POWDER TOPICAL at 08:00

## 2017-07-24 RX ADMIN — CLOPIDOGREL 75 MG: 75 TABLET, FILM COATED ORAL at 07:33

## 2017-07-24 RX ADMIN — PHENYTOIN SODIUM 200 MG: 100 CAPSULE, EXTENDED RELEASE ORAL at 07:33

## 2017-07-24 RX ADMIN — ASPIRIN 325 MG: 325 TABLET ORAL at 07:33

## 2017-07-24 RX ADMIN — AMANTADINE HYDROCHLORIDE 100 MG: 100 CAPSULE ORAL at 07:32

## 2017-07-24 RX ADMIN — FAMOTIDINE 20 MG: 20 TABLET, FILM COATED ORAL at 07:33

## 2017-07-24 RX ADMIN — VITAMIN D, TAB 1000IU (100/BT) 1000 UNITS: 25 TAB at 07:33

## 2017-07-24 RX ADMIN — VITAMIN D, TAB 1000IU (100/BT) 1000 UNITS: 25 TAB at 17:43

## 2017-07-24 NOTE — TELEPHONE ENCOUNTER
I S/W Ms. Figueroa, guardian for Rafita Davis.  He is presently in [facility].  At the last conference Dr. Baker talked of moving him to a brain injury facility such as [facility] or a facility in [place].  I let Ms. Figueroa know that Dr. Key recommended an EEG 3 months after discharge.  Ms. Figueroa said he most likely will move closer to home after discharge to [facility].  So possible a return to [place] for F/U will not be feasible.  I asked her to call if he wont be coming back to [place].  JOCELINE Valentin RN

## 2017-07-25 PROCEDURE — 97110 THERAPEUTIC EXERCISES: CPT

## 2017-07-25 PROCEDURE — 97532: CPT

## 2017-07-25 PROCEDURE — 97535 SELF CARE MNGMENT TRAINING: CPT

## 2017-07-25 RX ADMIN — NYSTATIN 1 APPLICATION: 100000 POWDER TOPICAL at 08:14

## 2017-07-25 RX ADMIN — ASPIRIN 325 MG: 325 TABLET ORAL at 08:10

## 2017-07-25 RX ADMIN — AMANTADINE HYDROCHLORIDE 100 MG: 100 CAPSULE ORAL at 08:10

## 2017-07-25 RX ADMIN — CLOPIDOGREL 75 MG: 75 TABLET, FILM COATED ORAL at 08:10

## 2017-07-25 RX ADMIN — AMANTADINE HYDROCHLORIDE 100 MG: 100 CAPSULE ORAL at 12:01

## 2017-07-25 RX ADMIN — ONDANSETRON 4 MG: 4 TABLET, FILM COATED ORAL at 06:10

## 2017-07-25 RX ADMIN — VITAMIN D, TAB 1000IU (100/BT) 1000 UNITS: 25 TAB at 17:09

## 2017-07-25 RX ADMIN — FAMOTIDINE 20 MG: 20 TABLET, FILM COATED ORAL at 08:10

## 2017-07-25 RX ADMIN — TAMSULOSIN HYDROCHLORIDE 0.8 MG: 0.4 CAPSULE ORAL at 08:10

## 2017-07-25 RX ADMIN — LACOSAMIDE 50 MG: 50 TABLET, FILM COATED ORAL at 20:08

## 2017-07-25 RX ADMIN — LACOSAMIDE 50 MG: 50 TABLET, FILM COATED ORAL at 08:10

## 2017-07-25 RX ADMIN — NYSTATIN: 100000 POWDER TOPICAL at 20:08

## 2017-07-25 RX ADMIN — VITAMIN D, TAB 1000IU (100/BT) 1000 UNITS: 25 TAB at 08:10

## 2017-07-26 ENCOUNTER — INPATIENT HOSPITAL (OUTPATIENT)
Dept: URBAN - METROPOLITAN AREA HOSPITAL 113 | Facility: HOSPITAL | Age: 57
End: 2017-07-26
Payer: MEDICAID

## 2017-07-26 ENCOUNTER — APPOINTMENT (OUTPATIENT)
Dept: CT IMAGING | Facility: HOSPITAL | Age: 57
End: 2017-07-26

## 2017-07-26 DIAGNOSIS — R11.2 NAUSEA WITH VOMITING, UNSPECIFIED: ICD-10-CM

## 2017-07-26 DIAGNOSIS — Z87.820 PERSONAL HISTORY OF TRAUMATIC BRAIN INJURY: ICD-10-CM

## 2017-07-26 PROCEDURE — 99222 1ST HOSP IP/OBS MODERATE 55: CPT

## 2017-07-26 PROCEDURE — 97110 THERAPEUTIC EXERCISES: CPT

## 2017-07-26 PROCEDURE — 97532: CPT

## 2017-07-26 PROCEDURE — 97535 SELF CARE MNGMENT TRAINING: CPT

## 2017-07-26 PROCEDURE — 97110 THERAPEUTIC EXERCISES: CPT | Performed by: PHYSICAL THERAPIST

## 2017-07-26 PROCEDURE — 70450 CT HEAD/BRAIN W/O DYE: CPT

## 2017-07-26 RX ORDER — SUCRALFATE 1 G/1
1 TABLET ORAL
Status: DISCONTINUED | OUTPATIENT
Start: 2017-07-26 | End: 2017-07-28 | Stop reason: HOSPADM

## 2017-07-26 RX ORDER — PANTOPRAZOLE SODIUM 40 MG/1
40 TABLET, DELAYED RELEASE ORAL
Status: DISCONTINUED | OUTPATIENT
Start: 2017-07-26 | End: 2017-07-28 | Stop reason: HOSPADM

## 2017-07-26 RX ADMIN — VITAMIN D, TAB 1000IU (100/BT) 1000 UNITS: 25 TAB at 17:28

## 2017-07-26 RX ADMIN — SUCRALFATE 1 G: 1 TABLET ORAL at 20:12

## 2017-07-26 RX ADMIN — CLOPIDOGREL 75 MG: 75 TABLET, FILM COATED ORAL at 07:36

## 2017-07-26 RX ADMIN — LACOSAMIDE 50 MG: 50 TABLET, FILM COATED ORAL at 20:12

## 2017-07-26 RX ADMIN — LACOSAMIDE 50 MG: 50 TABLET, FILM COATED ORAL at 07:37

## 2017-07-26 RX ADMIN — AMANTADINE HYDROCHLORIDE 100 MG: 100 CAPSULE ORAL at 12:08

## 2017-07-26 RX ADMIN — ONDANSETRON 4 MG: 4 TABLET, FILM COATED ORAL at 05:45

## 2017-07-26 RX ADMIN — ACETAMINOPHEN 650 MG: 325 TABLET ORAL at 20:13

## 2017-07-26 RX ADMIN — ASPIRIN 325 MG: 325 TABLET ORAL at 07:36

## 2017-07-26 RX ADMIN — AMANTADINE HYDROCHLORIDE 100 MG: 100 CAPSULE ORAL at 07:36

## 2017-07-26 RX ADMIN — FAMOTIDINE 20 MG: 20 TABLET, FILM COATED ORAL at 07:36

## 2017-07-26 RX ADMIN — PANTOPRAZOLE SODIUM 40 MG: 40 TABLET, DELAYED RELEASE ORAL at 20:12

## 2017-07-26 RX ADMIN — NYSTATIN: 100000 POWDER TOPICAL at 08:59

## 2017-07-26 RX ADMIN — TAMSULOSIN HYDROCHLORIDE 0.8 MG: 0.4 CAPSULE ORAL at 07:36

## 2017-07-26 RX ADMIN — ACETAMINOPHEN 650 MG: 325 TABLET ORAL at 07:36

## 2017-07-26 RX ADMIN — NYSTATIN: 100000 POWDER TOPICAL at 20:12

## 2017-07-26 RX ADMIN — VITAMIN D, TAB 1000IU (100/BT) 1000 UNITS: 25 TAB at 07:36

## 2017-07-27 ENCOUNTER — INPATIENT HOSPITAL (OUTPATIENT)
Dept: URBAN - METROPOLITAN AREA HOSPITAL 113 | Facility: HOSPITAL | Age: 57
End: 2017-07-27
Payer: MEDICAID

## 2017-07-27 DIAGNOSIS — Z87.820 PERSONAL HISTORY OF TRAUMATIC BRAIN INJURY: ICD-10-CM

## 2017-07-27 DIAGNOSIS — R11.2 NAUSEA WITH VOMITING, UNSPECIFIED: ICD-10-CM

## 2017-07-27 PROCEDURE — 97532: CPT

## 2017-07-27 PROCEDURE — 97535 SELF CARE MNGMENT TRAINING: CPT

## 2017-07-27 PROCEDURE — 99231 SBSQ HOSP IP/OBS SF/LOW 25: CPT

## 2017-07-27 PROCEDURE — 97110 THERAPEUTIC EXERCISES: CPT

## 2017-07-27 RX ADMIN — SUCRALFATE 1 G: 1 TABLET ORAL at 17:09

## 2017-07-27 RX ADMIN — ACETAMINOPHEN 650 MG: 325 TABLET ORAL at 05:40

## 2017-07-27 RX ADMIN — ACETAMINOPHEN 650 MG: 325 TABLET ORAL at 20:20

## 2017-07-27 RX ADMIN — SUCRALFATE 1 G: 1 TABLET ORAL at 11:02

## 2017-07-27 RX ADMIN — VITAMIN D, TAB 1000IU (100/BT) 1000 UNITS: 25 TAB at 08:17

## 2017-07-27 RX ADMIN — AMANTADINE HYDROCHLORIDE 100 MG: 100 CAPSULE ORAL at 11:45

## 2017-07-27 RX ADMIN — POLYETHYLENE GLYCOL 3350 17 G: 17 POWDER, FOR SOLUTION ORAL at 09:32

## 2017-07-27 RX ADMIN — DOCUSATE SODIUM 100 MG: 100 CAPSULE, LIQUID FILLED ORAL at 09:32

## 2017-07-27 RX ADMIN — LACOSAMIDE 50 MG: 50 TABLET, FILM COATED ORAL at 20:20

## 2017-07-27 RX ADMIN — CLOPIDOGREL 75 MG: 75 TABLET, FILM COATED ORAL at 08:17

## 2017-07-27 RX ADMIN — PANTOPRAZOLE SODIUM 40 MG: 40 TABLET, DELAYED RELEASE ORAL at 17:09

## 2017-07-27 RX ADMIN — SUCRALFATE 1 G: 1 TABLET ORAL at 20:20

## 2017-07-27 RX ADMIN — LACOSAMIDE 50 MG: 50 TABLET, FILM COATED ORAL at 08:17

## 2017-07-27 RX ADMIN — ONDANSETRON 4 MG: 4 TABLET, FILM COATED ORAL at 05:40

## 2017-07-27 RX ADMIN — TAMSULOSIN HYDROCHLORIDE 0.8 MG: 0.4 CAPSULE ORAL at 08:16

## 2017-07-27 RX ADMIN — SUCRALFATE 1 G: 1 TABLET ORAL at 06:48

## 2017-07-27 RX ADMIN — VITAMIN D, TAB 1000IU (100/BT) 1000 UNITS: 25 TAB at 17:09

## 2017-07-27 RX ADMIN — ASPIRIN 325 MG: 325 TABLET ORAL at 08:17

## 2017-07-27 RX ADMIN — PANTOPRAZOLE SODIUM 40 MG: 40 TABLET, DELAYED RELEASE ORAL at 05:40

## 2017-07-27 RX ADMIN — NYSTATIN: 100000 POWDER TOPICAL at 20:20

## 2017-07-27 RX ADMIN — AMANTADINE HYDROCHLORIDE 100 MG: 100 CAPSULE ORAL at 08:17

## 2017-07-28 VITALS
SYSTOLIC BLOOD PRESSURE: 108 MMHG | HEIGHT: 70 IN | HEART RATE: 64 BPM | RESPIRATION RATE: 18 BRPM | WEIGHT: 128.8 LBS | BODY MASS INDEX: 18.44 KG/M2 | TEMPERATURE: 97.5 F | OXYGEN SATURATION: 97 % | DIASTOLIC BLOOD PRESSURE: 63 MMHG

## 2017-07-28 LAB
ALBUMIN SERPL-MCNC: 3.5 G/DL (ref 3.5–5.2)
ALBUMIN/GLOB SERPL: 1.5 G/DL
ALP SERPL-CCNC: 84 U/L (ref 39–117)
ALT SERPL W P-5'-P-CCNC: 39 U/L (ref 1–41)
ANION GAP SERPL CALCULATED.3IONS-SCNC: 8.2 MMOL/L
AST SERPL-CCNC: 18 U/L (ref 1–40)
BILIRUB SERPL-MCNC: <0.2 MG/DL (ref 0.1–1.2)
BUN BLD-MCNC: 24 MG/DL (ref 6–20)
BUN/CREAT SERPL: 27.9 (ref 7–25)
CALCIUM SPEC-SCNC: 9 MG/DL (ref 8.6–10.5)
CHLORIDE SERPL-SCNC: 103 MMOL/L (ref 98–107)
CO2 SERPL-SCNC: 29.8 MMOL/L (ref 22–29)
CREAT BLD-MCNC: 0.86 MG/DL (ref 0.76–1.27)
GFR SERPL CREATININE-BSD FRML MDRD: 91 ML/MIN/1.73
GLOBULIN UR ELPH-MCNC: 2.4 GM/DL
GLUCOSE BLD-MCNC: 121 MG/DL (ref 65–99)
POTASSIUM BLD-SCNC: 4.1 MMOL/L (ref 3.5–5.2)
PROT SERPL-MCNC: 5.9 G/DL (ref 6–8.5)
SODIUM BLD-SCNC: 141 MMOL/L (ref 136–145)

## 2017-07-28 PROCEDURE — 97532: CPT

## 2017-07-28 PROCEDURE — 97112 NEUROMUSCULAR REEDUCATION: CPT | Performed by: OCCUPATIONAL THERAPIST

## 2017-07-28 PROCEDURE — 97110 THERAPEUTIC EXERCISES: CPT

## 2017-07-28 PROCEDURE — 97535 SELF CARE MNGMENT TRAINING: CPT | Performed by: OCCUPATIONAL THERAPIST

## 2017-07-28 PROCEDURE — 97110 THERAPEUTIC EXERCISES: CPT | Performed by: OCCUPATIONAL THERAPIST

## 2017-07-28 PROCEDURE — 80053 COMPREHEN METABOLIC PANEL: CPT | Performed by: INTERNAL MEDICINE

## 2017-07-28 RX ORDER — PANTOPRAZOLE SODIUM 40 MG/1
40 TABLET, DELAYED RELEASE ORAL
Start: 2017-07-28 | End: 2022-06-08

## 2017-07-28 RX ORDER — POLYETHYLENE GLYCOL 3350 17 G/17G
17 POWDER, FOR SOLUTION ORAL DAILY PRN
Start: 2017-07-28 | End: 2022-06-29

## 2017-07-28 RX ORDER — AMANTADINE HYDROCHLORIDE 100 MG/1
100 CAPSULE, GELATIN COATED ORAL 2 TIMES DAILY
Status: ON HOLD
Start: 2017-07-28 | End: 2022-11-09

## 2017-07-28 RX ORDER — LACOSAMIDE 100 MG/1
100 TABLET ORAL EVERY 12 HOURS SCHEDULED
Qty: 60 TABLET
Start: 2017-07-31 | End: 2022-06-29

## 2017-07-28 RX ORDER — ASPIRIN 325 MG
325 TABLET ORAL DAILY
Start: 2017-07-28 | End: 2022-06-29

## 2017-07-28 RX ORDER — ONDANSETRON 4 MG/1
4 TABLET, FILM COATED ORAL EVERY 24 HOURS
Refills: 0
Start: 2017-07-28 | End: 2022-06-29

## 2017-07-28 RX ORDER — LACOSAMIDE 50 MG/1
50 TABLET ORAL EVERY 12 HOURS SCHEDULED
Qty: 60 TABLET
Start: 2017-07-28 | End: 2017-07-30

## 2017-07-28 RX ORDER — TAMSULOSIN HYDROCHLORIDE 0.4 MG/1
0.8 CAPSULE ORAL DAILY
Qty: 30 CAPSULE
Start: 2017-07-28 | End: 2022-06-29

## 2017-07-28 RX ORDER — ACETAMINOPHEN 325 MG/1
650 TABLET ORAL EVERY 6 HOURS PRN
Refills: 0
Start: 2017-07-28 | End: 2022-06-08 | Stop reason: SDUPTHER

## 2017-07-28 RX ORDER — PSEUDOEPHEDRINE HCL 30 MG
100 TABLET ORAL 2 TIMES DAILY PRN
Refills: 0
Start: 2017-07-28 | End: 2022-06-29

## 2017-07-28 RX ORDER — BISACODYL 10 MG
10 SUPPOSITORY, RECTAL RECTAL DAILY PRN
Refills: 0
Start: 2017-07-28 | End: 2022-06-29

## 2017-07-28 RX ORDER — NYSTATIN 100000 [USP'U]/G
POWDER TOPICAL EVERY 12 HOURS SCHEDULED
Refills: 0
Start: 2017-07-28 | End: 2022-06-29

## 2017-07-28 RX ORDER — SUCRALFATE 1 G/1
1 TABLET ORAL
Start: 2017-07-28 | End: 2022-06-29

## 2017-07-28 RX ORDER — CLOPIDOGREL BISULFATE 75 MG/1
75 TABLET ORAL DAILY
Qty: 30 TABLET
Start: 2017-07-28

## 2017-07-28 RX ADMIN — AMANTADINE HYDROCHLORIDE 100 MG: 100 CAPSULE ORAL at 11:42

## 2017-07-28 RX ADMIN — ONDANSETRON 4 MG: 4 TABLET, FILM COATED ORAL at 05:51

## 2017-07-28 RX ADMIN — PANTOPRAZOLE SODIUM 40 MG: 40 TABLET, DELAYED RELEASE ORAL at 05:51

## 2017-07-28 RX ADMIN — ACETAMINOPHEN 650 MG: 325 TABLET ORAL at 05:51

## 2017-07-28 RX ADMIN — AMANTADINE HYDROCHLORIDE 100 MG: 100 CAPSULE ORAL at 08:33

## 2017-07-28 RX ADMIN — VITAMIN D, TAB 1000IU (100/BT) 1000 UNITS: 25 TAB at 08:33

## 2017-07-28 RX ADMIN — ASPIRIN 325 MG: 325 TABLET ORAL at 08:33

## 2017-07-28 RX ADMIN — SUCRALFATE 1 G: 1 TABLET ORAL at 11:42

## 2017-07-28 RX ADMIN — CLOPIDOGREL 75 MG: 75 TABLET, FILM COATED ORAL at 08:33

## 2017-07-28 RX ADMIN — TAMSULOSIN HYDROCHLORIDE 0.8 MG: 0.4 CAPSULE ORAL at 08:33

## 2017-07-28 RX ADMIN — LACOSAMIDE 50 MG: 50 TABLET, FILM COATED ORAL at 08:33

## 2017-07-28 RX ADMIN — SUCRALFATE 1 G: 1 TABLET ORAL at 05:51

## 2017-07-28 RX ADMIN — NYSTATIN: 100000 POWDER TOPICAL at 08:36

## 2017-07-31 LAB
H PYLORI IGA SER IA-ACNC: <9 UNITS (ref 0–8.9)
H PYLORI IGG SER IA-ACNC: <0.9 U/ML (ref 0–0.8)

## 2017-08-09 NOTE — PROGRESS NOTES
PPS CMG Coordinator  Inpatient Rehabilitation Discharge    Mode of Locomotion: Walking.    Discharge Against Medical Advice:  No.  Discharge Information  Patient Discharged Alive:  Yes  Discharge Destination/Living Setting: Skilled Nursing Facility  Diagnosis for Interruption/Death: ICD    Impairment Group: Brain Dysfunction: 02.1 Non-traumatic    Comorbidities: ICD    Complications: ICD      MATILDE Bladder Accidents: 0  - Accidents.  Patient used medications/device this  shift.  2017 3:10:00 PM  Bladder Score = 6. Patient has not had an accident, but uses a  device/medication.  MATILDE Bowel Accident: 0  - Accidents.  Patient used medications/device this shift.   2017 9:59:00 AM  Bowel Score = 4.  Two (2) bowel accidents.      QUALITY INDICATORS  Health Conditions: Fall(s) Since Admission:  No  Section M. Skin Conditions Discharge:  Unhealed Pressure Ulcer(s) at Stage 1 or  Higher:  Yes.    . Current Number of Unhealed Pressure Ulcers    Number of Unhealed Stage 1: 1  Number of Unhealed Stage 2: 0  Number of Unhealed Stage 3: 0  Number of Unhealed Stage 4: 0  Number of Unhealed Unstageable Due to Non-removable Dressin  Number of Unhealed Unstageable Due to Slough/Eschar: 0  Number of Unhealed Unstageable Due to Suspected Deep Tissue Injury: 0    . Worsening in Pressure Ulcer Status Since Admission:    Number of Worsening Stage 2: 0  Number of Worsening Stage 3: 0  Number of Worsening Stage 4: 0  Number of Worsening Unstageable Due to Non-removable Dressin  Number of Worsening Unstageable Due to Slough/Eschar: 0  Number of Worsening Unstageable Due to Suspected Deep Tissue Injury: 0    . Healed Pressure Ulcer(s):    Number of Healed Stage 1: 1  Number of Healed Stage 2: 0  Number of Healed Stage 3: 0  Number of Healed Stage 4: 0    . Influenza Vaccine - Discharge  Received in this facility for this year's influenza vaccination season:  No.  Influenza Vaccine Not Received Due To:  Patient not in this facility during this  year's influenza vaccination season.    Date Influenza Vaccine Received (if applicable)  Text Entry    Signed by: Taqueria Gaffney RN

## 2017-09-14 ENCOUNTER — OFFICE (OUTPATIENT)
Dept: URBAN - METROPOLITAN AREA OTHER 6 | Facility: OTHER | Age: 57
End: 2017-09-14
Payer: MEDICAID

## 2017-09-14 VITALS — DIASTOLIC BLOOD PRESSURE: 70 MMHG | WEIGHT: 128 LBS | HEART RATE: 76 BPM | SYSTOLIC BLOOD PRESSURE: 104 MMHG

## 2017-09-14 DIAGNOSIS — Z93.1 GASTROSTOMY STATUS: ICD-10-CM

## 2017-09-14 PROCEDURE — 99202 OFFICE O/P NEW SF 15 MIN: CPT

## 2017-10-11 ENCOUNTER — HOSPITAL ENCOUNTER (EMERGENCY)
Facility: HOSPITAL | Age: 57
Discharge: HOME | End: 2017-10-11
Attending: EMERGENCY MEDICINE | Admitting: EMERGENCY MEDICINE

## 2017-10-11 ENCOUNTER — APPOINTMENT (OUTPATIENT)
Dept: CT IMAGING | Facility: HOSPITAL | Age: 57
End: 2017-10-11

## 2017-10-11 VITALS
TEMPERATURE: 98.7 F | OXYGEN SATURATION: 98 % | WEIGHT: 145 LBS | HEART RATE: 64 BPM | DIASTOLIC BLOOD PRESSURE: 87 MMHG | RESPIRATION RATE: 16 BRPM | BODY MASS INDEX: 21.98 KG/M2 | SYSTOLIC BLOOD PRESSURE: 131 MMHG | HEIGHT: 68 IN

## 2017-10-11 DIAGNOSIS — F43.24 ADJUSTMENT DISORDER WITH DISTURBANCE OF CONDUCT: Primary | ICD-10-CM

## 2017-10-11 LAB
BILIRUB UR QL STRIP: NEGATIVE
CLARITY UR: CLEAR
COLOR UR: YELLOW
GLUCOSE UR STRIP-MCNC: NEGATIVE MG/DL
HGB UR QL STRIP.AUTO: NEGATIVE
KETONES UR QL STRIP: NEGATIVE
LEUKOCYTE ESTERASE UR QL STRIP.AUTO: NEGATIVE
NITRITE UR QL STRIP: NEGATIVE
PH UR STRIP.AUTO: 6.5 [PH] (ref 5–8)
PROT UR QL STRIP: NEGATIVE
SP GR UR STRIP: 1.02 (ref 1–1.03)
UROBILINOGEN UR QL STRIP: NORMAL

## 2017-10-11 PROCEDURE — 99283 EMERGENCY DEPT VISIT LOW MDM: CPT

## 2017-10-11 PROCEDURE — 70450 CT HEAD/BRAIN W/O DYE: CPT

## 2017-10-11 PROCEDURE — 81003 URINALYSIS AUTO W/O SCOPE: CPT | Performed by: NURSE PRACTITIONER

## 2017-10-11 PROCEDURE — 90791 PSYCH DIAGNOSTIC EVALUATION: CPT

## 2017-10-11 NOTE — ED TRIAGE NOTES
Lives at [facility]  patti, patient has behavior problem due to it. Tonight he had episode of behavior disturbance, staff wanted him to be evaluated.

## 2017-10-11 NOTE — DISCHARGE INSTRUCTIONS
Continue current home medications  Follow up with pmd as needed  Return to er for mental status changes, fever, chills, vomiting, chest pain, shortness of air, or any new or worsening symptoms

## 2017-10-11 NOTE — ED PROVIDER NOTES
EMERGENCY DEPARTMENT ENCOUNTER    CHIEF COMPLAINT  Chief Complaint: Psychiatric evaluation   History given by:patient   History limited by:TRUDY  Room Number: 05/05  PMD: Jaz Devlin DO      HPI:  Pt is a 57 y.o. male who presents for a psychiatric evaluation. Pt arrives to the ED via EMS from [facility]. Pt has a past history of a stroke, and at baseline has a behavior problem due to the stroke. Facility reports that tonight, the pt has a behavioral disturbance, and they would like him to be evaluated. On arrival to the ED, pt states that he is unsure why he is in the ED and is confused. He ha   Past Medical History of a stroke.     Duration: unknown  Location:psych  Radiation:none  Quality:behavioral disturbance  Intensity/Severity:moderate  Progression:unknown   Associated Symptoms:unknown  Aggravating Factors:unknown  Alleviating Factors:unknown  Previous Episodes:hx of a stroke and behavioral disturbance  Treatment before arrival:none    PAST MEDICAL HISTORY  Active Ambulatory Problems     Diagnosis Date Noted   • Subarachnoid bleed 06/21/2017   • Subarachnoid hemorrhage 06/21/2017   • Oropharyngeal dysphagia 06/21/2017   • Seizure 06/21/2017   • Protein-calorie malnutrition, moderate 06/21/2017   • Urinary retention 06/26/2017     Resolved Ambulatory Problems     Diagnosis Date Noted   • Somnolence 06/21/2017     Past Medical History:   Diagnosis Date   • Failure to thrive in adult    • Injury of back    • Kidney stones    • Seizures    • Stroke    • Urine retention        PAST SURGICAL HISTORY  Past Surgical History:   Procedure Laterality Date   • BACK SURGERY     • BRAIN SURGERY     • KIDNEY STONE SURGERY     •  SHUNT INSERTION         FAMILY HISTORY  History reviewed. No pertinent family history.    SOCIAL HISTORY  Social History     Social History   • Marital status:      Spouse name: N/A   • Number of children: N/A   • Years of education: N/A     Occupational History   • Not on  file.     Social History Main Topics   • Smoking status: Current Every Day Smoker     Packs/day: 2.00   • Smokeless tobacco: Not on file   • Alcohol use No   • Drug use: Yes     Special: Marijuana   • Sexual activity: Defer     Other Topics Concern   • Not on file     Social History Narrative   • No narrative on file         ALLERGIES  Review of patient's allergies indicates no known allergies.    REVIEW OF SYSTEMS  Review of Systems   Unable to perform ROS: Mental status change       PHYSICAL EXAM  ED Triage Vitals   Temp Heart Rate Resp BP SpO2   10/11/17 0001 10/11/17 0001 10/11/17 0001 10/11/17 0001 10/11/17 0001   98.4 °F (36.9 °C) 74 18 133/80 98 %     Physical Exam   Constitutional: He is well-developed, well-nourished, and in no distress. No distress.   HENT:   Head: Atraumatic.   Mouth/Throat: Mucous membranes are normal.   Eyes: Conjunctivae are normal. Pupils are equal, round, and reactive to light. No scleral icterus.   Neck: Normal range of motion.   Cardiovascular: Normal rate, regular rhythm and normal heart sounds.    Pulmonary/Chest: Effort normal and breath sounds normal.   Musculoskeletal: Normal range of motion.   Neurological: He is alert.   Pleasantly confused.    Skin: Skin is warm and dry.   Nursing note and vitals reviewed.      LAB RESULTS  Recent Results (from the past 24 hour(s))   Urinalysis With / Culture If Indicated - Urine, Clean Catch    Collection Time: 10/11/17  2:12 AM   Result Value Ref Range    Color, UA Yellow Yellow, Straw    Appearance, UA Clear Clear    pH, UA 6.5 5.0 - 8.0    Specific Gravity, UA 1.018 1.005 - 1.030    Glucose, UA Negative Negative    Ketones, UA Negative Negative    Bilirubin, UA Negative Negative    Blood, UA Negative Negative    Protein, UA Negative Negative    Leuk Esterase, UA Negative Negative    Nitrite, UA Negative Negative    Urobilinogen, UA 0.2 E.U./dL 0.2 - 1.0 E.U./dL       I ordered the above labs and reviewed the results    RADIOLOGY  CT  "Head Without Contrast   Preliminary Result   Limited study. No definite acute intracranial pathology. Overall no   significant change.                      I ordered the above noted radiological studies and reviewed the images on the PACS system.      PROGRESS AND CONSULTS    01:06  [facility] notified about pt and decline baseline labs at this time.     01:47  [facility] RN provides additional hx obtained from the pt's facility. Pt has been increasingly aggressive for the past week, but is confused at baseline. Staff reports concern that the pt may have UTI. Will order UA and CT head.     02:24  Reviewed pt's history and workup with Dr. Crocker.  At bedside evaluation, they agree with the plan of care.     02:41  CT head shows NAD, UA is negative. Pt has been cleared for discharge by [facility]. Report will be called to facility by RN. Pt will be discharged.    DIAGNOSIS  Final diagnoses:   Adjustment disorder with disturbance of conduct       FOLLOW UP   Jaz Devlin,   [address] [place], [place] [address] [phone]    Call in 1 day          COURSE & MEDICAL DECISION MAKING  Pertinent Labs and Imaging studies that were ordered and reviewed are noted above.     MEDICATIONS GIVEN IN ER  Medications - No data to display    /80  Pulse 74  Temp 98.4 °F (36.9 °C) (Tympanic)   Resp 18  Ht 68\" (172.7 cm)  Wt 145 lb (65.8 kg)  SpO2 98%  BMI 22.05 kg/m2      I personally reviewed the past medical history, past surgical history, social history, family history, current medications and allergies as they appear in this chart.  The scribe's note accurately reflects the work and decisions made by me.     Documentation assistance provided by evette Hemphill for KALEE Fowler on 10/11/2017 at 12:13 AM. Information recorded by the marvelibshawna was done at my direction and has been verified and validated by me.           Mary Hemphill  10/11/17 9671       ARI Alejandre  10/12/17 5244    "
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
## 2017-12-14 ENCOUNTER — OFFICE (OUTPATIENT)
Dept: URBAN - METROPOLITAN AREA OTHER 6 | Facility: OTHER | Age: 57
End: 2017-12-14

## 2017-12-14 VITALS — WEIGHT: 146 LBS | HEART RATE: 64 BPM | DIASTOLIC BLOOD PRESSURE: 70 MMHG | SYSTOLIC BLOOD PRESSURE: 110 MMHG

## 2017-12-14 DIAGNOSIS — Z93.1 GASTROSTOMY STATUS: ICD-10-CM

## 2017-12-14 DIAGNOSIS — I67.1 CEREBRAL ANEURYSM, NONRUPTURED: ICD-10-CM

## 2017-12-14 PROCEDURE — 99212 OFFICE O/P EST SF 10 MIN: CPT

## 2018-02-08 ENCOUNTER — OFFICE (OUTPATIENT)
Dept: URBAN - METROPOLITAN AREA OTHER 6 | Facility: OTHER | Age: 58
End: 2018-02-08
Payer: MEDICAID

## 2018-02-08 VITALS
DIASTOLIC BLOOD PRESSURE: 72 MMHG | HEIGHT: 68 IN | HEART RATE: 80 BPM | SYSTOLIC BLOOD PRESSURE: 106 MMHG | WEIGHT: 154 LBS

## 2018-02-08 DIAGNOSIS — Z43.1 ENCOUNTER FOR ATTENTION TO GASTROSTOMY: ICD-10-CM

## 2018-02-08 DIAGNOSIS — R42 DIZZINESS AND GIDDINESS: ICD-10-CM

## 2018-02-08 PROCEDURE — 99213 OFFICE O/P EST LOW 20 MIN: CPT

## 2022-06-03 ENCOUNTER — OFFICE VISIT (OUTPATIENT)
Dept: FAMILY MEDICINE CLINIC | Facility: CLINIC | Age: 62
End: 2022-06-03

## 2022-06-03 VITALS
RESPIRATION RATE: 18 BRPM | OXYGEN SATURATION: 97 % | HEART RATE: 61 BPM | SYSTOLIC BLOOD PRESSURE: 110 MMHG | TEMPERATURE: 97.1 F | BODY MASS INDEX: 21.98 KG/M2 | HEIGHT: 68 IN | DIASTOLIC BLOOD PRESSURE: 70 MMHG | WEIGHT: 145 LBS

## 2022-06-03 DIAGNOSIS — J06.9 ACUTE URI: Primary | ICD-10-CM

## 2022-06-03 DIAGNOSIS — Z12.11 COLON CANCER SCREENING: ICD-10-CM

## 2022-06-03 DIAGNOSIS — R05.9 COUGH: ICD-10-CM

## 2022-06-03 DIAGNOSIS — H65.01 RIGHT ACUTE SEROUS OTITIS MEDIA, RECURRENCE NOT SPECIFIED: ICD-10-CM

## 2022-06-03 PROBLEM — G91.0 HYDROCEPHALUS, COMMUNICATING: Status: ACTIVE | Noted: 2017-06-03

## 2022-06-03 PROBLEM — I60.4 SUBARACHNOID HEMORRHAGE FROM BASILAR ARTERY ANEURYSM: Status: ACTIVE | Noted: 2017-06-03

## 2022-06-03 PROBLEM — R56.1 SEIZURE AFTER HEAD INJURY: Status: ACTIVE | Noted: 2017-06-21

## 2022-06-03 PROBLEM — F31.9 BIPOLAR DISORDER: Status: ACTIVE | Noted: 2019-09-03

## 2022-06-03 PROBLEM — G25.0 ESSENTIAL TREMOR: Status: ACTIVE | Noted: 2020-08-25

## 2022-06-03 PROBLEM — Z98.2 S/P VENTRICULOPERITONEAL SHUNT: Status: ACTIVE | Noted: 2017-06-16

## 2022-06-03 PROBLEM — N21.0 BLADDER STONE: Status: ACTIVE | Noted: 2017-06-26

## 2022-06-03 PROBLEM — K21.9 GASTROESOPHAGEAL REFLUX DISEASE WITHOUT ESOPHAGITIS: Status: ACTIVE | Noted: 2020-05-12

## 2022-06-03 PROBLEM — F17.210 CIGARETTE SMOKER: Status: ACTIVE | Noted: 2019-05-31

## 2022-06-03 PROBLEM — R25.1 TREMOR: Status: ACTIVE | Noted: 2021-06-11

## 2022-06-03 PROBLEM — R51.9 HEADACHE: Status: ACTIVE | Noted: 2018-09-03

## 2022-06-03 PROBLEM — N20.0 KIDNEY STONES: Status: ACTIVE | Noted: 2019-05-31

## 2022-06-03 PROBLEM — R41.3 MEMORY IMPAIRMENT: Status: ACTIVE | Noted: 2018-10-29

## 2022-06-03 PROBLEM — R00.1 BRADYCARDIA: Status: ACTIVE | Noted: 2017-06-03

## 2022-06-03 PROBLEM — I62.9 INTRACRANIAL HEMORRHAGE: Status: ACTIVE | Noted: 2017-06-03

## 2022-06-03 PROBLEM — F02.818 DEMENTIA ASSOCIATED WITH OTHER UNDERLYING DISEASE WITH BEHAVIORAL DISTURBANCE: Status: ACTIVE | Noted: 2021-06-11

## 2022-06-03 PROBLEM — G47.00 PERSISTENT INSOMNIA: Status: ACTIVE | Noted: 2020-08-25

## 2022-06-03 PROBLEM — K64.4 EXTERNAL HEMORRHOIDS: Status: ACTIVE | Noted: 2021-05-27

## 2022-06-03 PROBLEM — F33.9 RECURRENT MAJOR DEPRESSION: Status: ACTIVE | Noted: 2018-09-12

## 2022-06-03 LAB
EXPIRATION DATE: NORMAL
FLUAV AG NPH QL: NEGATIVE
FLUBV AG NPH QL: NEGATIVE
INTERNAL CONTROL: NORMAL
Lab: NORMAL

## 2022-06-03 PROCEDURE — 87804 INFLUENZA ASSAY W/OPTIC: CPT | Performed by: NURSE PRACTITIONER

## 2022-06-03 PROCEDURE — 99203 OFFICE O/P NEW LOW 30 MIN: CPT | Performed by: NURSE PRACTITIONER

## 2022-06-03 RX ORDER — GUAIFENESIN DEXTROMETHORPHAN HYDROBROMIDE ORAL SOLUTION 10; 100 MG/5ML; MG/5ML
SOLUTION ORAL
COMMUNITY
End: 2022-07-07

## 2022-06-03 RX ORDER — DIPHENOXYLATE HYDROCHLORIDE AND ATROPINE SULFATE 2.5; .025 MG/1; MG/1
TABLET ORAL DAILY
COMMUNITY

## 2022-06-03 RX ORDER — OMEPRAZOLE 40 MG/1
40 CAPSULE, DELAYED RELEASE ORAL DAILY
COMMUNITY
End: 2022-06-08

## 2022-06-03 RX ORDER — DIPHENOXYLATE HYDROCHLORIDE AND ATROPINE SULFATE 2.5; .025 MG/1; MG/1
TABLET ORAL
COMMUNITY
End: 2022-06-15 | Stop reason: SDUPTHER

## 2022-06-03 RX ORDER — DIAPER,BRIEF,INFANT-TODD,DISP
1 EACH MISCELLANEOUS 2 TIMES DAILY PRN
COMMUNITY

## 2022-06-03 RX ORDER — FINASTERIDE 5 MG/1
5 TABLET, FILM COATED ORAL DAILY
COMMUNITY
End: 2022-06-08

## 2022-06-03 RX ORDER — ARIPIPRAZOLE 15 MG/1
15 TABLET ORAL DAILY
COMMUNITY

## 2022-06-03 RX ORDER — FLUTICASONE PROPIONATE 50 MCG
2 SPRAY, SUSPENSION (ML) NASAL DAILY
Qty: 16 G | Refills: 0 | Status: SHIPPED | OUTPATIENT
Start: 2022-06-03 | End: 2022-08-04

## 2022-06-03 RX ORDER — BENZTROPINE MESYLATE 0.5 MG/1
TABLET ORAL
COMMUNITY
End: 2022-06-15 | Stop reason: SDUPTHER

## 2022-06-03 RX ORDER — BENZONATATE 100 MG/1
100 CAPSULE ORAL 3 TIMES DAILY PRN
Qty: 30 CAPSULE | Refills: 0 | Status: SHIPPED | OUTPATIENT
Start: 2022-06-03 | End: 2022-06-15

## 2022-06-03 RX ORDER — RIVASTIGMINE TARTRATE 1.5 MG/1
1.5 CAPSULE ORAL 2 TIMES DAILY
COMMUNITY

## 2022-06-03 RX ORDER — LEVOTHYROXINE SODIUM 0.05 MG/1
50 TABLET ORAL DAILY
COMMUNITY

## 2022-06-03 RX ORDER — PHENOL 1.4 %
AEROSOL, SPRAY (ML) MUCOUS MEMBRANE
COMMUNITY
End: 2022-06-08

## 2022-06-03 RX ORDER — AZELASTINE 1 MG/ML
1 SPRAY, METERED NASAL 2 TIMES DAILY
Qty: 1 EACH | Refills: 0 | Status: SHIPPED | OUTPATIENT
Start: 2022-06-03 | End: 2022-06-17

## 2022-06-03 RX ORDER — BENZTROPINE MESYLATE 0.5 MG/1
TABLET ORAL
COMMUNITY
Start: 2022-05-26 | End: 2022-06-29

## 2022-06-03 RX ORDER — PREDNISONE 20 MG/1
20 TABLET ORAL 2 TIMES DAILY
Qty: 6 TABLET | Refills: 0 | Status: SHIPPED | OUTPATIENT
Start: 2022-06-03 | End: 2022-06-06

## 2022-06-03 RX ORDER — ASPIRIN 81 MG/1
81 TABLET, CHEWABLE ORAL DAILY
COMMUNITY
End: 2022-06-29

## 2022-06-03 RX ORDER — LANOLIN ALCOHOL/MO/W.PET/CERES
400 CREAM (GRAM) TOPICAL DAILY
COMMUNITY

## 2022-06-03 RX ORDER — ACETAMINOPHEN 500 MG
TABLET ORAL
COMMUNITY

## 2022-06-03 RX ORDER — PROPRANOLOL HYDROCHLORIDE 20 MG/1
20 TABLET ORAL 2 TIMES DAILY
COMMUNITY
End: 2022-07-08

## 2022-06-03 NOTE — PROGRESS NOTES
"Florence Davis is a 62 y.o.. male.     Pt here today to become established. Pt here with an LPN from his living facility, [facility] (a memory care facility).    Pt sees Dr. Alexandre-movement clinic; Dr. Holloway-physiatrist (rehab).    Cough  This is a new problem. Episode onset: 1 week. The problem has been unchanged. The cough is non-productive. Associated symptoms include shortness of breath (history of, not new, no changes). Pertinent negatives include no ear congestion, ear pain, fever, headaches, nasal congestion, postnasal drip, rhinorrhea or sore throat.       The following portions of the patient's history were reviewed and updated as appropriate: allergies, current medications, past family history, past medical history, past social history, past surgical history and problem list.    Past Medical History:   Diagnosis Date   • Failure to thrive in adult    • Injury of back    • Kidney stones    • Seizures (HCC)    • Stroke (HCC)    • Urine retention        Past Surgical History:   Procedure Laterality Date   • BACK SURGERY     • BRAIN SURGERY     • KIDNEY STONE SURGERY     • URETEROSCOPY LASER LITHOTRIPSY WITH STENT INSERTION N/A 04/28/2020   •  SHUNT INSERTION         Review of Systems   Constitutional: Negative.  Negative for fever.   HENT: Negative.  Negative for congestion, ear pain, postnasal drip, rhinorrhea and sore throat.    Respiratory: Positive for cough and shortness of breath (history of, not new, no changes).    Cardiovascular: Negative.    Gastrointestinal: Negative for diarrhea, nausea and vomiting.   Genitourinary: Negative.    Neurological: Negative for headaches.       No Known Allergies    Objective     Vitals:    06/03/22 1003   BP: 110/70   BP Location: Left arm   Patient Position: Sitting   Cuff Size: Adult   Pulse: 61   Resp: 18   Temp: 97.1 °F (36.2 °C)   TempSrc: Temporal   SpO2: 97%   Weight: 65.8 kg (145 lb)   Height: 172.7 cm (67.99\")     Body mass index is " 22.05 kg/m².    Physical Exam  Vitals reviewed.   HENT:      Head: Normocephalic.      Right Ear: A middle ear effusion (clear fluid) is present. Tympanic membrane is not erythematous.      Left Ear: Tympanic membrane normal.  No middle ear effusion. Tympanic membrane is not erythematous.      Nose: Nose normal.      Mouth/Throat:      Mouth: Mucous membranes are moist.      Pharynx: Oropharyngeal exudate (clear pnd) present. No pharyngeal swelling or posterior oropharyngeal erythema.   Eyes:      Pupils: Pupils are equal, round, and reactive to light.   Cardiovascular:      Rate and Rhythm: Normal rate and regular rhythm.   Pulmonary:      Effort: Pulmonary effort is normal. No accessory muscle usage or respiratory distress.      Breath sounds: Normal breath sounds. No stridor. No wheezing, rhonchi or rales.      Comments: Clear/diminished to bases  Lymphadenopathy:      Cervical: No cervical adenopathy.   Neurological:      Mental Status: He is alert.           Current Outpatient Medications:   •  acetaminophen (TYLENOL) 500 MG tablet, acetaminophen 500 mg tablet  TAKE TWO TABLETS BY MOUTH EVERY 6 HOURS AS NEEDED FOR PAIN. MAX SIX TABLETS EVERY 24 HOURS, Disp: , Rfl:   •  ARIPiprazole (ABILIFY) 15 MG tablet, Take 15 mg by mouth Daily., Disp: , Rfl:   •  aspirin 81 MG chewable tablet, Chew 81 mg Daily., Disp: , Rfl:   •  benzonatate (Tessalon Perles) 100 MG capsule, Take 1 capsule by mouth 3 (Three) Times a Day As Needed for Cough., Disp: 30 capsule, Rfl: 0  •  benztropine (COGENTIN) 0.5 MG tablet, benztropine 0.5 mg tablet  TAKE ONE TABLET BY MOUTH TWICE DAILY FOR FOURTEEN DAYS THEN ONE TABLET EVERY MORNING FOR FOURTEEN DAYS THEN DISCONTINUE, Disp: , Rfl:   •  benztropine (COGENTIN) 0.5 MG tablet, TAKE ONE TABLET BY MOUTH TWICE DAILY FOR FOURTEEN DAYS THEN ONE TABLET EVERY MORNING FOR FOURTEEN DAYS THEN DISCONTINUE, Disp: , Rfl:   •  Carboxymethylcellul-Glycerin 0.5-0.9 % solution, Refresh Optive 0.5 %-0.9 % eye  drops  ONE DROP THREE TIMES DAILY IN EACH EYE, Disp: , Rfl:   •  cholecalciferol 1000 UNITS tablet, 1,000 Units by Per G Tube route 2 (Two) Times a Day., Disp: , Rfl:   •  clopidogrel (PLAVIX) 75 MG tablet, 1 tablet by Per G Tube route Daily., Disp: 30 tablet, Rfl:   •  folic acid (FOLVITE) 400 MCG tablet, Take 400 mcg by mouth Daily., Disp: , Rfl:   •  hydrocortisone 1 % ointment, Apply 1 application topically to the appropriate area as directed 2 (Two) Times a Day As Needed., Disp: , Rfl:   •  levothyroxine (SYNTHROID, LEVOTHROID) 50 MCG tablet, Take 50 mcg by mouth Daily., Disp: , Rfl:   •  multivitamin (THERAGRAN) tablet tablet, Take  by mouth Daily., Disp: , Rfl:   •  multivitamin (THERAGRAN) tablet tablet, One Daily Multivitamin tablet  TAKE ONE TABLET BY MOUTH DAILY, Disp: , Rfl:   •  nystatin (MYCOSTATIN) 139048 UNIT/GM powder, Apply  topically Every 12 (Twelve) Hours., Disp: , Rfl: 0  •  ondansetron (ZOFRAN) 4 MG tablet, Take 1 tablet by mouth Daily., Disp: , Rfl: 0  •  propranolol (INDERAL) 20 MG tablet, Take 20 mg by mouth 2 (Two) Times a Day., Disp: , Rfl:   •  rivastigmine (EXELON) 1.5 MG capsule, Take 1.5 mg by mouth 2 (Two) Times a Day., Disp: , Rfl:   •  sucralfate (CARAFATE) 1 G tablet, Take 1 tablet by mouth 4 (Four) Times a Day Before Meals & at Bedtime., Disp: , Rfl:   •  tamsulosin (FLOMAX) 0.4 MG capsule 24 hr capsule, Take 2 capsules by mouth Daily., Disp: 30 capsule, Rfl:   •  acetaminophen (TYLENOL) 325 MG tablet, 2 tablets by Per G Tube route Every 6 (Six) Hours As Needed for Mild Pain (1-3)., Disp: , Rfl: 0  •  amantadine (SYMMETREL) 100 MG capsule, Take 1 capsule by mouth 2 (Two) Times a Day. At 8 AM and 12 Noon for neurostimulation, Disp: , Rfl:   •  aspirin 325 MG tablet, 1 tablet by Per G Tube route Daily., Disp: , Rfl:   •  azelastine (ASTELIN) 0.1 % nasal spray, 1 spray into the nostril(s) as directed by provider 2 (Two) Times a Day for 14 days. Use in each nostril as directed, Disp:  1 each, Rfl: 0  •  bisacodyl (DULCOLAX) 10 MG suppository, Insert 1 suppository into the rectum Daily As Needed for Constipation., Disp: , Rfl: 0  •  Cyanocobalamin 500 MCG sublingual tablet, cyanocobalamin (vit B-12) 500 mcg disintegrating tablet,sublingual  PLACE ONE TABLET UNDER THE TONGUE EVERY DAY, Disp: , Rfl:   •  dextromethorphan-guaifenesin (ROBITUSSIN-DM)  MG/5ML liquid, Jazmin-Tussin DM 10 mg-100 mg/5 mL oral liquid  TAKE TEN ML EVERY 4 HOURS BY MOUTH AS NEEDED, Disp: , Rfl:   •  divalproex (DEPAKOTE) 500 MG DR tablet, divalproex 500 mg tablet,delayed release, Disp: , Rfl:   •  docusate sodium 100 MG capsule, Take 100 mg by mouth 2 (Two) Times a Day As Needed for Constipation., Disp: , Rfl: 0  •  escitalopram (LEXAPRO) 10 MG tablet, Take 10 mg by mouth Daily., Disp: , Rfl:   •  esomeprazole (nexIUM) 40 MG capsule, esomeprazole magnesium 40 mg capsule,delayed release, Disp: , Rfl:   •  finasteride (PROSCAR) 5 MG tablet, finasteride 5 mg tablet  TAKE ONE TABLET BY MOUTH EVERY DAY, Disp: , Rfl:   •  fluticasone (FLONASE) 50 MCG/ACT nasal spray, 2 sprays into the nostril(s) as directed by provider Daily for 14 days., Disp: 16 g, Rfl: 0  •  lacosamide (VIMPAT) 100 MG tablet tablet, Take 1 tablet by mouth Every 12 (Twelve) Hours. Starting July 31 (on 50 mg bid through July 30), Disp: 60 tablet, Rfl:   •  levothyroxine (SYNTHROID, LEVOTHROID) 50 MCG tablet, levothyroxine 50 mcg tablet  TAKE ONE TABLET BY MOUTH EVERY MORNING BEFORE BREAKFAST, Disp: , Rfl:   •  Melatonin 10 MG tablet, melatonin 10 mg tablet  TAKE ONE TABLET BY MOUTH AT BEDTIME, Disp: , Rfl:   •  polyethylene glycol (MIRALAX) packet, Take 17 g by mouth Daily As Needed (constipation)., Disp: , Rfl:     Recent Results (from the past 168 hour(s))   POCT Influenza A/B    Collection Time: 06/03/22 10:36 AM    Specimen: Swab   Result Value Ref Range    Rapid Influenza A Ag Negative Negative    Rapid Influenza B Ag Negative Negative    Internal  Control Passed Passed    Lot Number N/A     Expiration Date N/A    COVID-19,[facility] ROUTINE, NP/OP SWAB IN TRANSPORT MEDIA OR ESWAB 72 HR TAT - Swab, Nasopharynx    Collection Time: 06/03/22 12:48 PM    Specimen: Nasopharynx; Swab   Result Value Ref Range    SARS-CoV-2, GEOFFREY Not Detected Not Detected   SARS-CoV-2, GEOFFREY 2 DAY TAT - ,    Collection Time: 06/03/22 12:48 PM   Result Value Ref Range    [facility] SARS-COV-2, GEOFFREY 2 DAY TAT Performed        Diagnoses and all orders for this visit:    1. Acute URI (Primary)    2. Right acute serous otitis media, recurrence not specified  -     azelastine (ASTELIN) 0.1 % nasal spray; 1 spray into the nostril(s) as directed by provider 2 (Two) Times a Day for 14 days. Use in each nostril as directed  Dispense: 1 each; Refill: 0  -     fluticasone (FLONASE) 50 MCG/ACT nasal spray; 2 sprays into the nostril(s) as directed by provider Daily for 14 days.  Dispense: 16 g; Refill: 0    3. Cough  -     POCT Influenza A/B  -     Cancel: COVID-19,[facility] ROUTINE, NP/OP SWAB IN TRANSPORT MEDIA OR ESWAB 72 HR TAT - Swab, Nasopharynx  -     predniSONE (DELTASONE) 20 MG tablet; Take 1 tablet by mouth 2 (Two) Times a Day for 3 days.  Dispense: 6 tablet; Refill: 0  -     benzonatate (Tessalon Perles) 100 MG capsule; Take 1 capsule by mouth 3 (Three) Times a Day As Needed for Cough.  Dispense: 30 capsule; Refill: 0  -     COVID-19,[facility] ROUTINE, NP/OP SWAB IN TRANSPORT MEDIA OR ESWAB 72 HR TAT - Swab, Nasopharynx    4. Colon cancer screening  -     Ambulatory Referral For Screening Colonoscopy    Other orders  -     SARS-CoV-2, GEOFFREY 2 DAY TAT - ,        Patient Instructions   Drink plenty of fluids-water preferably, eat a heart healthy diet, get plenty of sleep and do warm salt water gargles twice a day until feeling better; pt to stay in quarantine until results of covid19 testing back.      Return for fasting labs and then Annual physical.

## 2022-06-05 LAB
LABCORP SARS-COV-2, NAA 2 DAY TAT: NORMAL
SARS-COV-2 RNA RESP QL NAA+PROBE: NOT DETECTED

## 2022-06-08 DIAGNOSIS — Z00.00 ANNUAL PHYSICAL EXAM: Primary | ICD-10-CM

## 2022-06-08 DIAGNOSIS — Z12.5 PROSTATE CANCER SCREENING: ICD-10-CM

## 2022-06-08 RX ORDER — PHENOL 1.4 %
AEROSOL, SPRAY (ML) MUCOUS MEMBRANE
COMMUNITY

## 2022-06-08 RX ORDER — ESOMEPRAZOLE MAGNESIUM 40 MG/1
CAPSULE, DELAYED RELEASE ORAL
COMMUNITY
End: 2022-06-29

## 2022-06-08 RX ORDER — FINASTERIDE 5 MG/1
TABLET, FILM COATED ORAL
COMMUNITY

## 2022-06-08 RX ORDER — OMEPRAZOLE 40 MG/1
CAPSULE, DELAYED RELEASE ORAL
COMMUNITY
End: 2022-06-08

## 2022-06-08 RX ORDER — LEVOTHYROXINE SODIUM 0.05 MG/1
TABLET ORAL
COMMUNITY
End: 2022-06-15 | Stop reason: SDUPTHER

## 2022-06-08 RX ORDER — ESCITALOPRAM OXALATE 10 MG/1
10 TABLET ORAL DAILY
COMMUNITY
End: 2022-06-29

## 2022-06-08 RX ORDER — DIVALPROEX SODIUM 500 MG/1
TABLET, DELAYED RELEASE ORAL
COMMUNITY
End: 2022-06-29

## 2022-06-08 NOTE — PATIENT INSTRUCTIONS
Drink plenty of fluids-water preferably, eat a heart healthy diet, get plenty of sleep and do warm salt water gargles twice a day until feeling better; pt to stay in quarantine until results of covid19 testing back.

## 2022-06-10 LAB
ALBUMIN SERPL-MCNC: 3.9 G/DL (ref 3.8–4.8)
ALBUMIN/GLOB SERPL: 1.6 {RATIO} (ref 1.2–2.2)
ALP SERPL-CCNC: 70 IU/L (ref 44–121)
ALT SERPL-CCNC: 8 IU/L (ref 0–44)
APPEARANCE UR: CLEAR
AST SERPL-CCNC: 8 IU/L (ref 0–40)
BACTERIA #/AREA URNS HPF: NORMAL /[HPF]
BASOPHILS # BLD AUTO: 0.1 X10E3/UL (ref 0–0.2)
BASOPHILS NFR BLD AUTO: 1 %
BILIRUB SERPL-MCNC: 0.2 MG/DL (ref 0–1.2)
BILIRUB UR QL STRIP: NEGATIVE
BUN SERPL-MCNC: 21 MG/DL (ref 8–27)
BUN/CREAT SERPL: 23 (ref 10–24)
CALCIUM SERPL-MCNC: 9.1 MG/DL (ref 8.6–10.2)
CASTS URNS QL MICRO: NORMAL /LPF
CHLORIDE SERPL-SCNC: 104 MMOL/L (ref 96–106)
CHOLEST SERPL-MCNC: 176 MG/DL (ref 100–199)
CO2 SERPL-SCNC: 25 MMOL/L (ref 20–29)
COLOR UR: YELLOW
CREAT SERPL-MCNC: 0.93 MG/DL (ref 0.76–1.27)
EGFRCR SERPLBLD CKD-EPI 2021: 92 ML/MIN/1.73
EOSINOPHIL # BLD AUTO: 0.2 X10E3/UL (ref 0–0.4)
EOSINOPHIL NFR BLD AUTO: 3 %
EPI CELLS #/AREA URNS HPF: NORMAL /HPF (ref 0–10)
ERYTHROCYTE [DISTWIDTH] IN BLOOD BY AUTOMATED COUNT: 13.3 % (ref 11.6–15.4)
GLOBULIN SER CALC-MCNC: 2.4 G/DL (ref 1.5–4.5)
GLUCOSE SERPL-MCNC: 95 MG/DL (ref 65–99)
GLUCOSE UR QL STRIP: NEGATIVE
HBA1C MFR BLD: 5.7 % (ref 4.8–5.6)
HCT VFR BLD AUTO: 41.7 % (ref 37.5–51)
HDLC SERPL-MCNC: 35 MG/DL
HGB BLD-MCNC: 14 G/DL (ref 13–17.7)
HGB UR QL STRIP: NEGATIVE
IMM GRANULOCYTES # BLD AUTO: 0.1 X10E3/UL (ref 0–0.1)
IMM GRANULOCYTES NFR BLD AUTO: 1 %
KETONES UR QL STRIP: NEGATIVE
LDLC SERPL CALC-MCNC: 112 MG/DL (ref 0–99)
LDLC/HDLC SERPL: 3.2 RATIO (ref 0–3.6)
LEUKOCYTE ESTERASE UR QL STRIP: NEGATIVE
LYMPHOCYTES # BLD AUTO: 1.9 X10E3/UL (ref 0.7–3.1)
LYMPHOCYTES NFR BLD AUTO: 24 %
MCH RBC QN AUTO: 33.2 PG (ref 26.6–33)
MCHC RBC AUTO-ENTMCNC: 33.6 G/DL (ref 31.5–35.7)
MCV RBC AUTO: 99 FL (ref 79–97)
MICRO URNS: NORMAL
MICRO URNS: NORMAL
MONOCYTES # BLD AUTO: 0.6 X10E3/UL (ref 0.1–0.9)
MONOCYTES NFR BLD AUTO: 8 %
NEUTROPHILS # BLD AUTO: 4.9 X10E3/UL (ref 1.4–7)
NEUTROPHILS NFR BLD AUTO: 63 %
NITRITE UR QL STRIP: NEGATIVE
PH UR STRIP: 6.5 [PH] (ref 5–7.5)
PLATELET # BLD AUTO: 350 X10E3/UL (ref 150–450)
POTASSIUM SERPL-SCNC: 4.3 MMOL/L (ref 3.5–5.2)
PROT SERPL-MCNC: 6.3 G/DL (ref 6–8.5)
PROT UR QL STRIP: NORMAL
PSA SERPL-MCNC: 0.5 NG/ML (ref 0–4)
RBC # BLD AUTO: 4.22 X10E6/UL (ref 4.14–5.8)
RBC #/AREA URNS HPF: NORMAL /HPF (ref 0–2)
SODIUM SERPL-SCNC: 142 MMOL/L (ref 134–144)
SP GR UR STRIP: 1.02 (ref 1–1.03)
T4 FREE SERPL-MCNC: 1.49 NG/DL (ref 0.82–1.77)
TRIGL SERPL-MCNC: 165 MG/DL (ref 0–149)
TSH SERPL DL<=0.005 MIU/L-ACNC: 2.16 UIU/ML (ref 0.45–4.5)
UROBILINOGEN UR STRIP-MCNC: 0.2 MG/DL (ref 0.2–1)
VLDLC SERPL CALC-MCNC: 29 MG/DL (ref 5–40)
WBC # BLD AUTO: 7.7 X10E3/UL (ref 3.4–10.8)
WBC #/AREA URNS HPF: NORMAL /HPF (ref 0–5)

## 2022-06-13 ENCOUNTER — PRE-PROCEDURE SCREENING (OUTPATIENT)
Dept: GASTROENTEROLOGY | Facility: CLINIC | Age: 62
End: 2022-06-13

## 2022-06-15 ENCOUNTER — HOSPITAL ENCOUNTER (OUTPATIENT)
Dept: GENERAL RADIOLOGY | Facility: HOSPITAL | Age: 62
Discharge: HOME | End: 2022-06-15
Admitting: NURSE PRACTITIONER

## 2022-06-15 ENCOUNTER — OFFICE VISIT (OUTPATIENT)
Dept: FAMILY MEDICINE CLINIC | Facility: CLINIC | Age: 62
End: 2022-06-15

## 2022-06-15 DIAGNOSIS — R00.1 BRADYCARDIA: ICD-10-CM

## 2022-06-15 DIAGNOSIS — R25.1 TREMOR: ICD-10-CM

## 2022-06-15 DIAGNOSIS — Z12.11 SCREENING FOR COLON CANCER: ICD-10-CM

## 2022-06-15 DIAGNOSIS — R05.9 COUGH: ICD-10-CM

## 2022-06-15 DIAGNOSIS — E78.2 MIXED HYPERLIPIDEMIA: ICD-10-CM

## 2022-06-15 DIAGNOSIS — Z00.00 ANNUAL PHYSICAL EXAM: Primary | ICD-10-CM

## 2022-06-15 DIAGNOSIS — F17.210 CIGARETTE SMOKER: ICD-10-CM

## 2022-06-15 PROCEDURE — 93000 ELECTROCARDIOGRAM COMPLETE: CPT | Performed by: NURSE PRACTITIONER

## 2022-06-15 PROCEDURE — 99396 PREV VISIT EST AGE 40-64: CPT | Performed by: NURSE PRACTITIONER

## 2022-06-15 PROCEDURE — 71046 X-RAY EXAM CHEST 2 VIEWS: CPT

## 2022-06-15 NOTE — PROGRESS NOTES
Subjective   Rafita Davis is a 62 y.o. male. Patient is here today for   Chief Complaint   Patient presents with   • Annual Exam     PT HERE FOR CPE          Vitals:    06/15/22 1343   BP: 110/68   Pulse: (!) 49   Resp: 18   Temp: 97.5 °F (36.4 °C)   SpO2: 98%     Body mass index is 24.41 kg/m².    The following portions of the patient's history were reviewed and updated as appropriate: allergies, current medications, past family history, past medical history, past social history, past surgical history and problem list.    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   • Bipolar affective disorder in remission (HCC)    • Failure to thrive in adult    • Impaired cognition    • Injury of back    • Intracranial hemorrhage (HCC)     non-traumatic   • Kidney stones    • Memory impairment    • Recurrent major depressive disorder, in remission (HCC)    • Seizures (HCC)    • Stroke (HCC)    • Traumatic brain injury (HCC)    • Urine retention       No Known Allergies   Social History     Socioeconomic History   • Marital status:    Tobacco Use   • Smoking status: Current Every Day Smoker     Packs/day: 1.00     Years: 45.00     Pack years: 45.00   • Smokeless tobacco: Never Used   Vaping Use   • Vaping Use: Never used   Substance and Sexual Activity   • Alcohol use: No   • Drug use: Never   • Sexual activity: Defer        Current Outpatient Medications:   •  acetaminophen (TYLENOL) 500 MG tablet, acetaminophen 500 mg tablet  TAKE TWO TABLETS BY MOUTH EVERY 6 HOURS AS NEEDED FOR PAIN. MAX SIX TABLETS EVERY 24 HOURS, Disp: , Rfl:   •  amantadine (SYMMETREL) 100 MG capsule, Take 1 capsule by mouth 2 (Two) Times a Day. At 8 AM and 12 Noon for neurostimulation, Disp: , Rfl:   •  ARIPiprazole (ABILIFY) 15 MG tablet, Take 15 mg by mouth Daily., Disp: ,  Rfl:   •  aspirin 325 MG tablet, 1 tablet by Per G Tube route Daily., Disp: , Rfl:   •  aspirin 81 MG chewable tablet, Chew 81 mg Daily., Disp: , Rfl:   •  azelastine (ASTELIN) 0.1 % nasal spray, 1 spray into the nostril(s) as directed by provider 2 (Two) Times a Day for 14 days. Use in each nostril as directed, Disp: 1 each, Rfl: 0  •  benztropine (COGENTIN) 0.5 MG tablet, TAKE ONE TABLET BY MOUTH TWICE DAILY FOR FOURTEEN DAYS THEN ONE TABLET EVERY MORNING FOR FOURTEEN DAYS THEN DISCONTINUE, Disp: , Rfl:   •  bisacodyl (DULCOLAX) 10 MG suppository, Insert 1 suppository into the rectum Daily As Needed for Constipation., Disp: , Rfl: 0  •  Carboxymethylcellul-Glycerin 0.5-0.9 % solution, Refresh Optive 0.5 %-0.9 % eye drops  ONE DROP THREE TIMES DAILY IN EACH EYE, Disp: , Rfl:   •  cholecalciferol 1000 UNITS tablet, 1,000 Units by Per G Tube route 2 (Two) Times a Day., Disp: , Rfl:   •  clopidogrel (PLAVIX) 75 MG tablet, 1 tablet by Per G Tube route Daily., Disp: 30 tablet, Rfl:   •  Cyanocobalamin 500 MCG sublingual tablet, cyanocobalamin (vit B-12) 500 mcg disintegrating tablet,sublingual  PLACE ONE TABLET UNDER THE TONGUE EVERY DAY, Disp: , Rfl:   •  dextromethorphan-guaifenesin (ROBITUSSIN-DM)  MG/5ML liquid, Jazmin-Tussin DM 10 mg-100 mg/5 mL oral liquid  TAKE TEN ML EVERY 4 HOURS BY MOUTH AS NEEDED, Disp: , Rfl:   •  divalproex (DEPAKOTE) 500 MG DR tablet, divalproex 500 mg tablet,delayed release, Disp: , Rfl:   •  docusate sodium 100 MG capsule, Take 100 mg by mouth 2 (Two) Times a Day As Needed for Constipation., Disp: , Rfl: 0  •  escitalopram (LEXAPRO) 10 MG tablet, Take 10 mg by mouth Daily., Disp: , Rfl:   •  esomeprazole (nexIUM) 40 MG capsule, esomeprazole magnesium 40 mg capsule,delayed release, Disp: , Rfl:   •  finasteride (PROSCAR) 5 MG tablet, finasteride 5 mg tablet  TAKE ONE TABLET BY MOUTH EVERY DAY, Disp: , Rfl:   •  fluticasone (FLONASE) 50 MCG/ACT nasal spray, 2 sprays into the nostril(s)  as directed by provider Daily for 14 days., Disp: 16 g, Rfl: 0  •  folic acid (FOLVITE) 400 MCG tablet, Take 400 mcg by mouth Daily., Disp: , Rfl:   •  hydrocortisone 1 % ointment, Apply 1 application topically to the appropriate area as directed 2 (Two) Times a Day As Needed., Disp: , Rfl:   •  lacosamide (VIMPAT) 100 MG tablet tablet, Take 1 tablet by mouth Every 12 (Twelve) Hours. Starting July 31 (on 50 mg bid through July 30), Disp: 60 tablet, Rfl:   •  levothyroxine (SYNTHROID, LEVOTHROID) 50 MCG tablet, Take 50 mcg by mouth Daily., Disp: , Rfl:   •  Melatonin 10 MG tablet, melatonin 10 mg tablet  TAKE ONE TABLET BY MOUTH AT BEDTIME, Disp: , Rfl:   •  multivitamin (THERAGRAN) tablet tablet, Take  by mouth Daily., Disp: , Rfl:   •  nystatin (MYCOSTATIN) 757427 UNIT/GM powder, Apply  topically Every 12 (Twelve) Hours., Disp: , Rfl: 0  •  ondansetron (ZOFRAN) 4 MG tablet, Take 1 tablet by mouth Daily., Disp: , Rfl: 0  •  polyethylene glycol (MIRALAX) packet, Take 17 g by mouth Daily As Needed (constipation)., Disp: , Rfl:   •  propranolol (INDERAL) 20 MG tablet, Take 20 mg by mouth 2 (Two) Times a Day., Disp: , Rfl:   •  rivastigmine (EXELON) 1.5 MG capsule, Take 1.5 mg by mouth 2 (Two) Times a Day., Disp: , Rfl:   •  sucralfate (CARAFATE) 1 G tablet, Take 1 tablet by mouth 4 (Four) Times a Day Before Meals & at Bedtime., Disp: , Rfl:   •  tamsulosin (FLOMAX) 0.4 MG capsule 24 hr capsule, Take 2 capsules by mouth Daily., Disp: 30 capsule, Rfl:      EKG in office 13:32: Sbrady, vent rate 41, [place] int 150, QRS 78    Objective   Pt here today for annual physical. Pt here today with LEA Mckoy. Leelee stating pt's psychiatrist titrating pt off of his benztropine; was on 2 tabs 0.5 mg, decreasing to 1 tab, and expected to d/c in future. Pt has hearing impairment, is supposed to wear hearing aids and chooses to not wear them. Pt lives at [facility]. Pt has OT, counseling, speech therapy and behavior programming by [facility]. Pt's  is [employer] case management. Pt's ex wife is his Guardian. Pt sees Dr. Quincy Holloway psychiatrist [phone], Tyrone Sandoval and Cande Scott, behavior analyst. Pt stating he listens to music through out day, smokes cigarettes through out day, drinks coffee during day and drinks some water. Pt states he likes to eat at [employer]. Pt stating he walks to pool twice a week.     Rafita ISAAC Susan 62 y.o. male who presents for an Annual Wellness Visit.  he has a history of   Patient Active Problem List   Diagnosis   • Subarachnoid bleed (HCC)   • Subarachnoid hemorrhage (HCC)   • Oropharyngeal dysphagia   • Seizure after head injury (HCC)   • Protein-calorie malnutrition, moderate (HCC)   • Bladder stone   • Essential tremor   • Bipolar disorder (HCC)   • Bradycardia   • Intracranial hemorrhage (HCC)   • Cigarette smoker   • Dementia associated with other underlying disease with behavioral disturbance (HCC)   • External hemorrhoids   • Gastroesophageal reflux disease without esophagitis   • Headache   • Hydrocephalus, communicating (HCC)   • Kidney stones   • Memory impairment   • Persistent insomnia   • Recurrent major depression (HCC)   • S/P ventriculoperitoneal shunt   • 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  he has been doing well with new interval problems.  Labs results discussed in detail with the patient.  Plan to update vaccines if needed today.    Health Habits:  Dental Exam. up to date  Eye Exam. up to date  Exercise: 2 times/week.  Current exercise activities include: walking      Recent Results (from the past 336 hour(s))   POCT Influenza A/B    Collection Time: 06/03/22 10:36 AM    Specimen: Swab   Result Value Ref Range    Rapid Influenza A Ag Negative Negative    Rapid Influenza B Ag Negative Negative    Internal Control Passed Passed    Lot Number N/A     Expiration Date N/A    COVID-19,[facility] ROUTINE, NP/OP SWAB IN  TRANSPORT MEDIA OR ESWAB 72 HR TAT - Swab, Nasopharynx    Collection Time: 06/03/22 12:48 PM    Specimen: Nasopharynx; Swab   Result Value Ref Range    SARS-CoV-2, GEOFFREY Not Detected Not Detected   SARS-CoV-2, GEOFFREY 2 DAY TAT - ,    Collection Time: 06/03/22 12:48 PM   Result Value Ref Range    [facility] SARS-COV-2, GEOFFREY 2 DAY TAT Performed    CBC & Differential    Collection Time: 06/09/22  8:17 AM    Specimen: Blood   Result Value Ref Range    WBC 7.7 3.4 - 10.8 x10E3/uL    RBC 4.22 4.14 - 5.80 x10E6/uL    Hemoglobin 14.0 13.0 - 17.7 g/dL    Hematocrit 41.7 37.5 - 51.0 %    MCV 99 (H) 79 - 97 fL    MCH 33.2 (H) 26.6 - 33.0 pg    MCHC 33.6 31.5 - 35.7 g/dL    RDW 13.3 11.6 - 15.4 %    Platelets 350 150 - 450 x10E3/uL    Neutrophil Rel % 63 Not Estab. %    Lymphocyte Rel % 24 Not Estab. %    Monocyte Rel % 8 Not Estab. %    Eosinophil Rel % 3 Not Estab. %    Basophil Rel % 1 Not Estab. %    Neutrophils Absolute 4.9 1.4 - 7.0 x10E3/uL    Lymphocytes Absolute 1.9 0.7 - 3.1 x10E3/uL    Monocytes Absolute 0.6 0.1 - 0.9 x10E3/uL    Eosinophils Absolute 0.2 0.0 - 0.4 x10E3/uL    Basophils Absolute 0.1 0.0 - 0.2 x10E3/uL    Immature Granulocyte Rel % 1 Not Estab. %    Immature Grans Absolute 0.1 0.0 - 0.1 x10E3/uL   Comprehensive Metabolic Panel    Collection Time: 06/09/22  8:17 AM    Specimen: Blood   Result Value Ref Range    Glucose 95 65 - 99 mg/dL    BUN 21 8 - 27 mg/dL    Creatinine 0.93 0.76 - 1.27 mg/dL    EGFR Result 92 >59 mL/min/1.73    BUN/Creatinine Ratio 23 10 - 24    Sodium 142 134 - 144 mmol/L    Potassium 4.3 3.5 - 5.2 mmol/L    Chloride 104 96 - 106 mmol/L    Total CO2 25 20 - 29 mmol/L    Calcium 9.1 8.6 - 10.2 mg/dL    Total Protein 6.3 6.0 - 8.5 g/dL    Albumin 3.9 3.8 - 4.8 g/dL    Globulin 2.4 1.5 - 4.5 g/dL    A/G Ratio 1.6 1.2 - 2.2    Total Bilirubin 0.2 0.0 - 1.2 mg/dL    Alkaline Phosphatase 70 44 - 121 IU/L    AST (SGOT) 8 0 - 40 IU/L    ALT (SGPT) 8 0 - 44 IU/L   Lipid Panel With LDL / HDL Ratio     Collection Time: 06/09/22  8:17 AM    Specimen: Blood   Result Value Ref Range    Total Cholesterol 176 100 - 199 mg/dL    Triglycerides 165 (H) 0 - 149 mg/dL    HDL Cholesterol 35 (L) >39 mg/dL    VLDL Cholesterol David 29 5 - 40 mg/dL    LDL Chol Calc (NIH) 112 (H) 0 - 99 mg/dL    LDL/HDL RATIO 3.2 0.0 - 3.6 ratio   TSH    Collection Time: 06/09/22  8:17 AM    Specimen: Blood   Result Value Ref Range    TSH 2.160 0.450 - 4.500 uIU/mL   T4, free    Collection Time: 06/09/22  8:17 AM    Specimen: Blood   Result Value Ref Range    Free T4 1.49 0.82 - 1.77 ng/dL   Urinalysis With Microscopic - Urine, Clean Catch    Collection Time: 06/09/22  8:17 AM    Specimen: Urine, Clean Catch   Result Value Ref Range    Specific Gravity, UA 1.016 1.005 - 1.030    pH, UA 6.5 5.0 - 7.5    Color, UA Yellow Yellow    Appearance, UA Clear Clear    Leukocytes, UA Negative Negative    Protein Trace Negative/Trace    Glucose, UA Negative Negative    Ketones Negative Negative    Blood, UA Negative Negative    Bilirubin, UA Negative Negative    Urobilinogen, UA 0.2 0.2 - 1.0 mg/dL    Nitrite, UA Negative Negative    Microscopic Examination Comment     Microscopic Examination See below:    Hemoglobin A1c    Collection Time: 06/09/22  8:17 AM    Specimen: Blood   Result Value Ref Range    Hemoglobin A1C 5.7 (H) 4.8 - 5.6 %   PSA SCREENING    Collection Time: 06/09/22  8:17 AM    Specimen: Blood   Result Value Ref Range    PSA 0.5 0.0 - 4.0 ng/mL   Microscopic Examination -    Collection Time: 06/09/22  8:17 AM   Result Value Ref Range    WBC, UA None seen 0 - 5 /hpf    RBC, UA 0-2 0 - 2 /hpf    Epithelial Cells (non renal) None seen 0 - 10 /hpf    Casts None seen None seen /lpf    Bacteria, UA None seen None seen/Few         Review of Systems   Constitutional: Negative.    HENT: Negative.    Respiratory: Positive for cough (not new, history of, no changes). Negative for shortness of breath.    Cardiovascular: Negative.    Gastrointestinal:  Negative.    Genitourinary: Negative.    Musculoskeletal:        Restless feet   Neurological: Negative.        Physical Exam  Constitutional:       Appearance: Normal appearance. He is well-developed.   HENT:      Head: Normocephalic and atraumatic.      Right Ear: Tympanic membrane normal. Tympanic membrane is not erythematous.      Left Ear: Tympanic membrane normal. Tympanic membrane is not erythematous.      Nose: Nose normal.   Eyes:      Conjunctiva/sclera: Conjunctivae normal.      Pupils: Pupils are equal, round, and reactive to light.   Neck:      Thyroid: No thyromegaly.      Vascular: No carotid bruit.      Trachea: No tracheal deviation.   Cardiovascular:      Rate and Rhythm: Normal rate and regular rhythm.      Pulses: Normal pulses.      Heart sounds: Normal heart sounds. No murmur heard.  Pulmonary:      Effort: No accessory muscle usage or respiratory distress.      Breath sounds: Normal breath sounds. No stridor. No decreased breath sounds, wheezing, rhonchi or rales.   Abdominal:      General: Bowel sounds are normal. There is no distension.      Palpations: Abdomen is soft. Abdomen is not rigid. There is no mass.      Tenderness: There is no abdominal tenderness. There is no guarding or rebound.      Hernia: No hernia is present.   Musculoskeletal:         General: Normal range of motion.      Cervical back: Normal range of motion and neck supple.   Feet:      Comments: Microfilament testing done and wnl  Lymphadenopathy:      Cervical: No cervical adenopathy.   Skin:     General: Skin is warm and dry.      Capillary Refill: Capillary refill takes 2 to 3 seconds.   Neurological:      Mental Status: He is alert.      Cranial Nerves: No dysarthria or facial asymmetry.   Psychiatric:         Mood and Affect: Mood normal.         Speech: Speech normal.         Behavior: Behavior is cooperative.         ASSESSMENT       Problems Addressed this Visit        Cardiac and Vasculature    Bradycardia     Relevant Orders    ECG 12 Lead    Ambulatory Referral to Cardiology       Neuro    Tremor       Tobacco    Cigarette smoker    Relevant Orders    CT Chest Low Dose Wo      Other Visit Diagnoses     Annual physical exam    -  Primary    Mixed hyperlipidemia        Relevant Orders    ECG 12 Lead    Cough        Relevant Orders    XR Chest PA & Lateral (Completed)    Screening for colon cancer        Relevant Orders    Cologuard - Stool, Per Rectum      Diagnoses       Codes Comments    Annual physical exam    -  Primary ICD-10-CM: Z00.00  ICD-9-CM: V70.0     Bradycardia     ICD-10-CM: R00.1  ICD-9-CM: 427.89     Mixed hyperlipidemia     ICD-10-CM: E78.2  ICD-9-CM: 272.2     Tremor     ICD-10-CM: R25.1  ICD-9-CM: 781.0     Cough     ICD-10-CM: R05.9  ICD-9-CM: 786.2     Cigarette smoker     ICD-10-CM: F17.210  ICD-9-CM: 305.1     Screening for colon cancer     ICD-10-CM: Z12.11  ICD-9-CM: V76.51           PLAN    Patient Instructions   Bradycardia: HR 41-49; previously in the 60's. Pt denies any chest pain or soa. EKG shows Sbrady. Will refer to cardiology for further eval.    HLD: diet controlled, continue to monitor; work on eating a heart healthy diet, drink plenty of water throughout day, and exercise 3-5 times a week, for more than 30 minutes at a time     Tremor to bailey. Feet: no tremors noted today during exam, normal sensation to bailey. Feet. Feeling could be due to reduction of pt's benztropine. Continue to monitor.    Cough/smoking: chest xray and low dose CT ordered for further eval.         Return for 6 months fasting labs and follow up.

## 2022-06-17 VITALS
SYSTOLIC BLOOD PRESSURE: 110 MMHG | WEIGHT: 160.5 LBS | RESPIRATION RATE: 18 BRPM | BODY MASS INDEX: 24.32 KG/M2 | HEART RATE: 49 BPM | HEIGHT: 68 IN | TEMPERATURE: 97.5 F | DIASTOLIC BLOOD PRESSURE: 68 MMHG | OXYGEN SATURATION: 98 %

## 2022-06-17 NOTE — PATIENT INSTRUCTIONS
Bradycardia: HR 41-49; previously in the 60's. Pt denies any chest pain or soa. EKG shows Sbrady. Will refer to cardiology for further eval.    HLD: diet controlled, continue to monitor; work on eating a heart healthy diet, drink plenty of water throughout day, and exercise 3-5 times a week, for more than 30 minutes at a time     Tremor to bailey. Feet: no tremors noted today during exam, normal sensation to bailey. Feet. Feeling could be due to reduction of pt's benztropine. Continue to monitor.    Cough/smoking: chest xray and low dose CT ordered for further eval.

## 2022-06-29 ENCOUNTER — OFFICE VISIT (OUTPATIENT)
Dept: CARDIOLOGY | Facility: CLINIC | Age: 62
End: 2022-06-29

## 2022-06-29 VITALS
HEIGHT: 68 IN | DIASTOLIC BLOOD PRESSURE: 64 MMHG | WEIGHT: 157 LBS | BODY MASS INDEX: 23.79 KG/M2 | SYSTOLIC BLOOD PRESSURE: 108 MMHG | HEART RATE: 58 BPM

## 2022-06-29 DIAGNOSIS — T50.905A BRADYCARDIA, DRUG INDUCED: Primary | ICD-10-CM

## 2022-06-29 DIAGNOSIS — R00.1 BRADYCARDIA, DRUG INDUCED: Primary | ICD-10-CM

## 2022-06-29 PROCEDURE — 99204 OFFICE O/P NEW MOD 45 MIN: CPT | Performed by: INTERNAL MEDICINE

## 2022-06-29 RX ORDER — OMEPRAZOLE 40 MG/1
CAPSULE, DELAYED RELEASE ORAL
COMMUNITY
Start: 2022-06-28

## 2022-06-29 NOTE — PROGRESS NOTES
"      CARDIOLOGY    Castillo Pagan MD    ENCOUNTER DATE:  06/29/2022    Rafita Davis / 62 y.o. / male        CHIEF COMPLAINT / REASON FOR OFFICE VISIT     Slow Heart Rate      HISTORY OF PRESENT ILLNESS       HPI  Rafita Davis is a 62 y.o. male who presents today for consultation.  Patient was found to be bradycardic with a heart rate dropping to 41.  With this he was referred to my office for further evaluation.  Patient is in a facility that cares for him.  Patient had suffered a brain injury in the past and this helps with his overall care due to memory issues.  In speaking with him he is actually quite lucid at times and very appropriate.  He is incredibly pleasant.  He denies palpitations shortness of breath chest discomfort syncope or near syncope.  He does say he feels drunk all the time.  He does not have spells of visual changes lightheadedness or intermittent episodes of feeling drunk he says that after he takes his medication and last throughout the day.      The following portions of the patient's history were reviewed and updated as appropriate: allergies, current medications, past family history, past medical history, past social history, past surgical history and problem list.      VITAL SIGNS     Visit Vitals  /64 (BP Location: Left arm)   Pulse 58   Ht 172.7 cm (68\")   Wt 71.2 kg (157 lb)   BMI 23.87 kg/m²         Wt Readings from Last 3 Encounters:   06/29/22 71.2 kg (157 lb)   06/15/22 72.8 kg (160 lb 7.9 oz)   06/03/22 65.8 kg (145 lb)     Body mass index is 23.87 kg/m².      REVIEW OF SYSTEMS   ROS        PHYSICAL EXAMINATION     Vitals reviewed.   Constitutional:       Appearance: Healthy appearance.   Pulmonary:      Effort: Pulmonary effort is normal.   Cardiovascular:      Normal rate. Regular rhythm. Normal S1. Normal S2.      Murmurs: There is no murmur.      No gallop. No click. No rub.   Pulses:     Intact distal pulses.   Edema:     Peripheral edema absent. "   Neurological:      Mental Status: Alert and oriented to person, place and time.           REVIEWED DATA     Procedures    Cardiac Procedures:  1.     Lipid Panel    Lipid Panel 6/9/22   Total Cholesterol 176   Triglycerides 165 (A)   HDL Cholesterol 35 (A)   VLDL Cholesterol 29   LDL Cholesterol  112 (A)   LDL/HDL Ratio 3.2   (A) Abnormal value       Comments are available for some flowsheets but are not being displayed.               ASSESSMENT & PLAN      Diagnosis Plan   1. Bradycardia, drug induced           SUMMARY/DISCUSSION  1. Bradycardia.  In my opinion more likely this is secondary to his propanolol.  He takes propanolol for essential tremors which she does not have essential tremor.  His caregiver who is with him today also has noticed that he is not really had upper extremity tremors.  Due to medicine change he has had restless legs.  He currently does not describe episodes of symptomatic bradycardia.  He is having a strong feeling all the time after he takes his medication and on review of his medications it could easily be from the propanolol.  In light of that I told them to cut his propanolol in half for 1 week and then subsequently stopped it.  Told to watch for tremors we will see what happens with his symptoms and follow back up with him in approximately 6 weeks.  With a normal physical exam and otherwise normal ECG I did not do any further testing at this time.  Of note he has multiple brain aneurysms as well as strokes and he is on Plavix for this.        MEDICATIONS         Discharge Medications          Accurate as of June 29, 2022  1:49 PM. If you have any questions, ask your nurse or doctor.            Continue These Medications      Instructions Start Date   acetaminophen 500 MG tablet  Commonly known as: TYLENOL   acetaminophen 500 mg tablet   TAKE TWO TABLETS BY MOUTH EVERY 6 HOURS AS NEEDED FOR PAIN. MAX SIX TABLETS EVERY 24 HOURS      amantadine 100 MG capsule  Commonly known as:  SYMMETREL   100 mg, Oral, 2 Times Daily, At 8 AM and 12 Noon for neurostimulation      ARIPiprazole 15 MG tablet  Commonly known as: ABILIFY   15 mg, Oral, Daily      aspirin 81 MG chewable tablet   81 mg, Oral, Daily      aspirin 325 MG tablet   325 mg, Per G Tube, Daily      benztropine 0.5 MG tablet  Commonly known as: COGENTIN   TAKE ONE TABLET BY MOUTH TWICE DAILY FOR FOURTEEN DAYS THEN ONE TABLET EVERY MORNING FOR FOURTEEN DAYS THEN DISCONTINUE      bisacodyl 10 MG suppository  Commonly known as: DULCOLAX   10 mg, Rectal, Daily PRN      Carboxymethylcellul-Glycerin 0.5-0.9 % solution   Refresh Optive 0.5 %-0.9 % eye drops   ONE DROP THREE TIMES DAILY IN EACH EYE      cholecalciferol 25 MCG (1000 UT) tablet  Commonly known as: VITAMIN D3   1,000 Units, Per G Tube, 2 Times Daily      clopidogrel 75 MG tablet  Commonly known as: PLAVIX   75 mg, Per G Tube, Daily      Cyanocobalamin 500 MCG sublingual tablet   cyanocobalamin (vit B-12) 500 mcg disintegrating tablet,sublingual   PLACE ONE TABLET UNDER THE TONGUE EVERY DAY      dextromethorphan-guaifenesin  MG/5ML liquid  Commonly known as: ROBITUSSIN-DM   Jazmin-Tussin DM 10 mg-100 mg/5 mL oral liquid   TAKE TEN ML EVERY 4 HOURS BY MOUTH AS NEEDED      divalproex 500 MG DR tablet  Commonly known as: DEPAKOTE   divalproex 500 mg tablet,delayed release      docusate sodium 100 MG capsule   100 mg, Oral, 2 Times Daily PRN      escitalopram 10 MG tablet  Commonly known as: LEXAPRO   10 mg, Oral, Daily      esomeprazole 40 MG capsule  Commonly known as: nexIUM   esomeprazole magnesium 40 mg capsule,delayed release      finasteride 5 MG tablet  Commonly known as: PROSCAR   finasteride 5 mg tablet   TAKE ONE TABLET BY MOUTH EVERY DAY      fluticasone 50 MCG/ACT nasal spray  Commonly known as: FLONASE   2 sprays, Nasal, Daily      folic acid 400 MCG tablet  Commonly known as: FOLVITE   400 mcg, Oral, Daily      hydrocortisone 1 % ointment   1 application, Topical, 2  Times Daily PRN      lacosamide 100 MG tablet tablet  Commonly known as: VIMPAT   100 mg, Oral, Every 12 Hours Scheduled, Starting July 31 (on 50 mg bid through July 30)      levothyroxine 50 MCG tablet  Commonly known as: SYNTHROID, LEVOTHROID   50 mcg, Oral, Daily      Melatonin 10 MG tablet   melatonin 10 mg tablet   TAKE ONE TABLET BY MOUTH AT BEDTIME      multivitamin tablet tablet   Oral, Daily      nystatin [id number] UNIT/GM powder  Commonly known as: MYCOSTATIN   Topical, Every 12 Hours Scheduled      omeprazole 40 MG capsule  Commonly known as: priLOSEC   No dose, route, or frequency recorded.      ondansetron 4 MG tablet  Commonly known as: ZOFRAN   4 mg, Oral, Every 24 Hours      polyethylene glycol packet  Commonly known as: MIRALAX   17 g, Oral, Daily PRN      propranolol 20 MG tablet  Commonly known as: INDERAL   20 mg, Oral, 2 Times Daily      rivastigmine 1.5 MG capsule  Commonly known as: EXELON   1.5 mg, Oral, 2 Times Daily      sucralfate 1 g tablet  Commonly known as: CARAFATE   1 g, Oral, 4 Times Daily Before Meals & Nightly      tamsulosin 0.4 MG capsule 24 hr capsule  Commonly known as: FLOMAX   0.8 mg, Oral, Daily                 **Dragon Disclaimer:   Much of this encounter note is an electronic transcription/translation of spoken language to printed text. The electronic translation of spoken language may permit erroneous, or at times, nonsensical words or phrases to be inadvertently transcribed. Although I have reviewed the note for such errors, some may still exist.

## 2022-07-07 ENCOUNTER — TELEPHONE (OUTPATIENT)
Dept: CARDIOLOGY | Facility: CLINIC | Age: 62
End: 2022-07-07

## 2022-07-07 ENCOUNTER — OFFICE VISIT (OUTPATIENT)
Dept: FAMILY MEDICINE CLINIC | Facility: CLINIC | Age: 62
End: 2022-07-07

## 2022-07-07 VITALS
RESPIRATION RATE: 18 BRPM | TEMPERATURE: 96.6 F | DIASTOLIC BLOOD PRESSURE: 70 MMHG | HEIGHT: 68 IN | BODY MASS INDEX: 23.01 KG/M2 | HEART RATE: 51 BPM | WEIGHT: 151.8 LBS | OXYGEN SATURATION: 98 % | SYSTOLIC BLOOD PRESSURE: 122 MMHG

## 2022-07-07 DIAGNOSIS — G25.81 RESTLESS LEG SYNDROME: Primary | ICD-10-CM

## 2022-07-07 PROCEDURE — 99213 OFFICE O/P EST LOW 20 MIN: CPT | Performed by: NURSE PRACTITIONER

## 2022-07-07 RX ORDER — BUPROPION HYDROCHLORIDE 150 MG/1
150 TABLET, EXTENDED RELEASE ORAL DAILY
Qty: 90 TABLET | Refills: 0 | Status: SHIPPED | OUTPATIENT
Start: 2022-07-07 | End: 2022-08-04 | Stop reason: SDUPTHER

## 2022-07-07 NOTE — PATIENT INSTRUCTIONS
Hold off stopping propranolol for 2 more weeks  Start wellbutrin  Discussed stretching and exercises to help with RLS  Pt and caregiver verbalized understanding.

## 2022-07-07 NOTE — TELEPHONE ENCOUNTER
Leelee RN @ [facility] left msg saying the pt needs a new RX for the Propranolol reflecting the change that Dr. Pagan mentioned at LOV 06/29/22.    She said it needs to go to [facility]-in chart.    SUMMARY/DISCUSSION  06/29/22  1. Bradycardia.  In my opinion more likely this is secondary to his propanolol.  He takes propanolol for essential tremors which she does not have essential tremor.  His caregiver who is with him today also has noticed that he is not really had upper extremity tremors.  Due to medicine change he has had restless legs.  He currently does not describe episodes of symptomatic bradycardia.  He is having a strong feeling all the time after he takes his medication and on review of his medications it could easily be from the propanolol.  In light of that I told them to cut his propanolol in half for 1 week and then subsequently stopped it.  Told to watch for tremors we will see what happens with his symptoms and follow back up with him in approximately 6 weeks.  With a normal physical exam and otherwise normal ECG I did not do any further testing at this time.  Of note he has multiple brain aneurysms as well as strokes and he is on Plavix for this.

## 2022-07-07 NOTE — PROGRESS NOTES
Subjective     Rafita Davis is a 62 y.o.. male.     Pt here today for c/o restless leg syndrome. Pt here today with LEA Mckoy. Pt with past medical problems of tremors and was on benztropine and titrated off due to side effects. Pt stating his ankles and feet mostly involved and he just can't seem to stop moving them through out the day. Pt stating he stands or squat to find relief. Pt stating he has the restless leg issues during daytime and night time. Pt stating his feet ache due to him standing all day. Pt stating the restlessness worsens with sitting. Pt currently on propranolol 10 mg twice a day to decrease to stopping due to lower HR. Pt was on propranolol to help with his tremors.         The following portions of the patient's history were reviewed and updated as appropriate: allergies, current medications, past family history, past medical history, past social history, past surgical history and problem list.    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   • Bipolar affective disorder in remission (HCC)    • Failure to thrive in adult    • Impaired cognition    • Injury of back    • Intracranial hemorrhage (HCC)     non-traumatic   • Kidney stones    • Memory impairment    • Recurrent major depressive disorder, in remission (HCC)    • Seizures (HCC)    • Stroke (HCC)    • Traumatic brain injury (HCC)    • Urine retention        Past Surgical History:   Procedure Laterality Date   • BACK SURGERY     • BRAIN SURGERY     • KIDNEY STONE SURGERY     • URETEROSCOPY LASER LITHOTRIPSY WITH STENT INSERTION N/A 04/28/2020   •  SHUNT INSERTION         Review of Systems   Constitutional: Negative.    Respiratory: Negative.    Cardiovascular: Negative.    Neurological: Positive for tremors (restless legs; denies tremors to bailey. arms/hands).  "  Psychiatric/Behavioral: Negative.        No Known Allergies    Objective     Vitals:    07/07/22 0851   BP: 122/70   BP Location: Left arm   Patient Position: Sitting   Cuff Size: Adult   Pulse: 51   Resp: 18   Temp: 96.6 °F (35.9 °C)   TempSrc: Temporal   SpO2: 98%   Weight: 68.9 kg (151 lb 12.8 oz)   Height: 172.7 cm (67.99\")     Body mass index is 23.09 kg/m².    Physical Exam  Vitals reviewed.   HENT:      Head: Normocephalic.   Eyes:      Pupils: Pupils are equal, round, and reactive to light.   Cardiovascular:      Rate and Rhythm: Normal rate and regular rhythm.   Pulmonary:      Effort: Pulmonary effort is normal.      Breath sounds: Normal breath sounds.   Musculoskeletal:      Comments: Muscle twitching noted to feet/ankles   Neurological:      Mental Status: He is alert and oriented to person, place, and time.   Psychiatric:         Mood and Affect: Mood is anxious (restless).         Behavior: Behavior normal.           Current Outpatient Medications:   •  acetaminophen (TYLENOL) 500 MG tablet, acetaminophen 500 mg tablet  TAKE TWO TABLETS BY MOUTH EVERY 6 HOURS AS NEEDED FOR PAIN. MAX SIX TABLETS EVERY 24 HOURS, Disp: , Rfl:   •  amantadine (SYMMETREL) 100 MG capsule, Take 1 capsule by mouth 2 (Two) Times a Day. At 8 AM and 12 Noon for neurostimulation, Disp: , Rfl:   •  ARIPiprazole (ABILIFY) 15 MG tablet, Take 15 mg by mouth Daily., Disp: , Rfl:   •  clopidogrel (PLAVIX) 75 MG tablet, 1 tablet by Per G Tube route Daily., Disp: 30 tablet, Rfl:   •  finasteride (PROSCAR) 5 MG tablet, finasteride 5 mg tablet  TAKE ONE TABLET BY MOUTH EVERY DAY, Disp: , Rfl:   •  folic acid (FOLVITE) 400 MCG tablet, Take 400 mcg by mouth Daily., Disp: , Rfl:   •  hydrocortisone 1 % ointment, Apply 1 application topically to the appropriate area as directed 2 (Two) Times a Day As Needed., Disp: , Rfl:   •  levothyroxine (SYNTHROID, LEVOTHROID) 50 MCG tablet, Take 50 mcg by mouth Daily., Disp: , Rfl:   •  Melatonin 10 MG " tablet, melatonin 10 mg tablet  TAKE ONE TABLET BY MOUTH AT BEDTIME, Disp: , Rfl:   •  multivitamin (THERAGRAN) tablet tablet, Take  by mouth Daily., Disp: , Rfl:   •  omeprazole (priLOSEC) 40 MG capsule, , Disp: , Rfl:   •  propranolol (INDERAL) 20 MG tablet, Take 20 mg by mouth 2 (Two) Times a Day., Disp: , Rfl:   •  rivastigmine (EXELON) 1.5 MG capsule, Take 1.5 mg by mouth 2 (Two) Times a Day., Disp: , Rfl:   •  buPROPion SR (Wellbutrin SR) 150 MG 12 hr tablet, Take 1 tablet by mouth Daily., Disp: 90 tablet, Rfl: 0  •  fluticasone (FLONASE) 50 MCG/ACT nasal spray, 2 sprays into the nostril(s) as directed by provider Daily for 14 days., Disp: 16 g, Rfl: 0        Diagnoses and all orders for this visit:    1. Restless leg syndrome (Primary)  -     buPROPion SR (Wellbutrin SR) 150 MG 12 hr tablet; Take 1 tablet by mouth Daily.  Dispense: 90 tablet; Refill: 0        Patient Instructions   Hold off stopping propranolol for 2 more weeks  Start wellbutrin  Discussed stretching and exercises to help with RLS  Pt and caregiver verbalized understanding.       Return for follow up in 1 month.

## 2022-07-07 NOTE — TELEPHONE ENCOUNTER
Left detailed msg for Leelee to call bk and clarify if a new Rx is truly needed, if the facility just needs documentation of what was discussed and if the pt is still taking the medication since it's been a week and he should have stopped it by now-if so, what dsg/frequency./prt

## 2022-07-08 RX ORDER — PROPRANOLOL HYDROCHLORIDE 10 MG/1
10 TABLET ORAL 2 TIMES DAILY
Qty: 14 TABLET | Refills: 0 | Status: SHIPPED | OUTPATIENT
Start: 2022-07-08 | End: 2022-08-04

## 2022-07-08 NOTE — TELEPHONE ENCOUNTER
Leelee called bk and said the pt never stopped taking the 20 mg.  The pharmacy would not accept the piece of paper Dr. Pagan wrote the instructions on.  They have to have a prescription sent to the pharmacy that says dc the 20 mg and start the 10 mg BID and d/c after 7 days.  It has to be a prescription to the pharmacy because they are a medicaid waiver program -they cannot cut pills in half and administer them-they have to give exactly what is on the prescription.    Thanks,  Marleny

## 2022-08-04 ENCOUNTER — OFFICE VISIT (OUTPATIENT)
Dept: FAMILY MEDICINE CLINIC | Facility: CLINIC | Age: 62
End: 2022-08-04

## 2022-08-04 VITALS
HEART RATE: 53 BPM | WEIGHT: 156 LBS | RESPIRATION RATE: 18 BRPM | SYSTOLIC BLOOD PRESSURE: 110 MMHG | OXYGEN SATURATION: 97 % | DIASTOLIC BLOOD PRESSURE: 68 MMHG | TEMPERATURE: 97.1 F | HEIGHT: 68 IN | BODY MASS INDEX: 23.64 KG/M2

## 2022-08-04 DIAGNOSIS — R19.5 POSITIVE COLORECTAL CANCER SCREENING USING COLOGUARD TEST: ICD-10-CM

## 2022-08-04 DIAGNOSIS — L60.0 INGROWN TOENAIL: ICD-10-CM

## 2022-08-04 DIAGNOSIS — R09.81 NASAL CONGESTION: ICD-10-CM

## 2022-08-04 DIAGNOSIS — H04.123 DRY EYES: ICD-10-CM

## 2022-08-04 DIAGNOSIS — G25.81 RESTLESS LEG SYNDROME: Primary | ICD-10-CM

## 2022-08-04 PROCEDURE — 99213 OFFICE O/P EST LOW 20 MIN: CPT | Performed by: NURSE PRACTITIONER

## 2022-08-04 RX ORDER — BUPROPION HYDROCHLORIDE 150 MG/1
150 TABLET, EXTENDED RELEASE ORAL 2 TIMES DAILY
Qty: 180 TABLET | Refills: 1 | Status: SHIPPED | OUTPATIENT
Start: 2022-08-04 | End: 2023-01-31 | Stop reason: SDUPTHER

## 2022-08-04 RX ORDER — CARBOXYMETHYLCELLULOSE SODIUM AND GLYCERIN 5; 9 MG/ML; MG/ML
SOLUTION/ DROPS OPHTHALMIC
COMMUNITY
Start: 2022-07-20 | End: 2022-08-04 | Stop reason: SDUPTHER

## 2022-08-04 RX ORDER — CARBOXYMETHYLCELLULOSE SODIUM AND GLYCERIN 5; 9 MG/ML; MG/ML
1 SOLUTION/ DROPS OPHTHALMIC 3 TIMES DAILY PRN
Qty: 15 ML | Refills: 0 | Status: SHIPPED | OUTPATIENT
Start: 2022-08-04

## 2022-08-04 RX ORDER — FLUTICASONE PROPIONATE 50 MCG
2 SPRAY, SUSPENSION (ML) NASAL DAILY
Qty: 16 G | Refills: 0 | Status: SHIPPED | OUTPATIENT
Start: 2022-08-04 | End: 2022-09-29

## 2022-08-04 RX ORDER — TAMSULOSIN HYDROCHLORIDE 0.4 MG/1
1 CAPSULE ORAL
COMMUNITY
Start: 2022-07-26

## 2022-08-04 RX ORDER — AZELASTINE 1 MG/ML
1 SPRAY, METERED NASAL 2 TIMES DAILY
Qty: 1 EACH | Refills: 0 | Status: SHIPPED | OUTPATIENT
Start: 2022-08-04 | End: 2022-08-18

## 2022-08-04 NOTE — PROGRESS NOTES
Subjective     Rafita Davis is a 62 y.o.. male.     Pt here today for follow up on RLS. Pt was started on wellbutrin 150 mg daily one month ago as his propranolol was being reduced off. Pt stating his legs are better but still shaking some. Pt has seen Dr. Holloway since our last office visit and denies any changes from that office visit. Pt admits he has not started exercises/stretching for his legs.    Pt c/o right nostril knot for 2 months, no pain, no bleeding, and states it feels like it has moved.     Pt c/o ingrown toenail to left foot big toe.    Pt requesting dry eye drops be switched from three times a day to three times a day prn due to him not needing them all the time.      The following portions of the patient's history were reviewed and updated as appropriate: allergies, current medications, past family history, past medical history, past social history, past surgical history and problem list.    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   • Bipolar affective disorder in remission (HCC)    • Failure to thrive in adult    • Impaired cognition    • Injury of back    • Intracranial hemorrhage (HCC)     non-traumatic   • Kidney stones    • Memory impairment    • Recurrent major depressive disorder, in remission (HCC)    • Seizures (HCC)    • Stroke (HCC)    • Traumatic brain injury (HCC)    • Urine retention        Past Surgical History:   Procedure Laterality Date   • BACK SURGERY     • BRAIN SURGERY     • KIDNEY STONE SURGERY     • URETEROSCOPY LASER LITHOTRIPSY WITH STENT INSERTION N/A 04/28/2020   •  SHUNT INSERTION         Review of Systems   Constitutional: Negative for fever.   HENT: Negative for nosebleeds and rhinorrhea.         Right nostril knot   Respiratory: Negative.    Cardiovascular: Negative.    Skin:        Left  "foot big toe ingrown toenail   Neurological:        RLS       No Known Allergies    Objective     Vitals:    08/04/22 0853   BP: 110/68   Pulse: 53   Resp: 18   Temp: 97.1 °F (36.2 °C)   TempSrc: Oral   SpO2: 97%   Weight: 70.8 kg (156 lb)   Height: 172.7 cm (67.99\")     Body mass index is 23.73 kg/m².    Physical Exam  Vitals reviewed.   HENT:      Head: Normocephalic.      Nose: Nasal deformity (minor right septal deviation) and congestion present.   Eyes:      Pupils: Pupils are equal, round, and reactive to light.   Cardiovascular:      Rate and Rhythm: Normal rate and regular rhythm.   Pulmonary:      Effort: Pulmonary effort is normal.      Breath sounds: Normal breath sounds.   Musculoskeletal:         General: Normal range of motion.   Skin:     Comments: Left foot great toe: no redness, no swelling, no discharge, toenail to medial side beginning ingrown   Neurological:      Mental Status: He is alert and oriented to person, place, and time.   Psychiatric:         Behavior: Behavior normal.           Current Outpatient Medications:   •  acetaminophen (TYLENOL) 500 MG tablet, acetaminophen 500 mg tablet  TAKE TWO TABLETS BY MOUTH EVERY 6 HOURS AS NEEDED FOR PAIN. MAX SIX TABLETS EVERY 24 HOURS, Disp: , Rfl:   •  amantadine (SYMMETREL) 100 MG capsule, Take 1 capsule by mouth 2 (Two) Times a Day. At 8 AM and 12 Noon for neurostimulation, Disp: , Rfl:   •  ARIPiprazole (ABILIFY) 15 MG tablet, Take 15 mg by mouth Daily., Disp: , Rfl:   •  buPROPion SR (Wellbutrin SR) 150 MG 12 hr tablet, Take 1 tablet by mouth 2 (Two) Times a Day., Disp: 180 tablet, Rfl: 1  •  clopidogrel (PLAVIX) 75 MG tablet, 1 tablet by Per G Tube route Daily., Disp: 30 tablet, Rfl:   •  finasteride (PROSCAR) 5 MG tablet, finasteride 5 mg tablet  TAKE ONE TABLET BY MOUTH EVERY DAY, Disp: , Rfl:   •  folic acid (FOLVITE) 400 MCG tablet, Take 400 mcg by mouth Daily., Disp: , Rfl:   •  hydrocortisone 1 % ointment, Apply 1 application topically to " the appropriate area as directed 2 (Two) Times a Day As Needed., Disp: , Rfl:   •  levothyroxine (SYNTHROID, LEVOTHROID) 50 MCG tablet, Take 50 mcg by mouth Daily., Disp: , Rfl:   •  Melatonin 10 MG tablet, melatonin 10 mg tablet  TAKE ONE TABLET BY MOUTH AT BEDTIME, Disp: , Rfl:   •  multivitamin (THERAGRAN) tablet tablet, Take  by mouth Daily., Disp: , Rfl:   •  omeprazole (priLOSEC) 40 MG capsule, , Disp: , Rfl:   •  Refresh Optive 0.5-0.9 % solution, Apply 1 drop to eye(s) as directed by provider 3 (Three) Times a Day As Needed (dry eyes)., Disp: 15 mL, Rfl: 0  •  rivastigmine (EXELON) 1.5 MG capsule, Take 1.5 mg by mouth 2 (Two) Times a Day., Disp: , Rfl:   •  tamsulosin (FLOMAX) 0.4 MG capsule 24 hr capsule, Take 1 capsule by mouth every night at bedtime., Disp: , Rfl:   •  azelastine (ASTELIN) 0.1 % nasal spray, 1 spray into the nostril(s) as directed by provider 2 (Two) Times a Day for 14 days. Use in each nostril as directed, Disp: 1 each, Rfl: 0  •  fluticasone (Flonase) 50 MCG/ACT nasal spray, 2 sprays into the nostril(s) as directed by provider Daily., Disp: 16 g, Rfl: 0    Recent Results (from the past 336 hour(s))   Cologuard - Stool, Per Rectum    Collection Time: 07/27/22  8:11 AM    Specimen: Per Rectum; Stool   Result Value Ref Range    Cologuard Positive (A) Negative         Diagnoses and all orders for this visit:    1. Restless leg syndrome (Primary)  -     buPROPion SR (Wellbutrin SR) 150 MG 12 hr tablet; Take 1 tablet by mouth 2 (Two) Times a Day.  Dispense: 180 tablet; Refill: 1    2. Positive colorectal cancer screening using Cologuard test  -     Ambulatory Referral For Screening Colonoscopy    3. Nasal congestion  -     azelastine (ASTELIN) 0.1 % nasal spray; 1 spray into the nostril(s) as directed by provider 2 (Two) Times a Day for 14 days. Use in each nostril as directed  Dispense: 1 each; Refill: 0  -     fluticasone (Flonase) 50 MCG/ACT nasal spray; 2 sprays into the nostril(s) as  directed by provider Daily.  Dispense: 16 g; Refill: 0    4. Ingrown toenail  Comments:  left great toe-minor    5. Dry eyes  -     Refresh Optive 0.5-0.9 % solution; Apply 1 drop to eye(s) as directed by provider 3 (Three) Times a Day As Needed (dry eyes).  Dispense: 15 mL; Refill: 0        Patient Instructions   RLS: will increase wellbutrin from 150 mg once a day to 150 mg twice a day; continue without propranolol-taken off med list. Recommend stretching exercises for legs daily.    Positive cologaurd testing results: referral to GI for colonoscopy, pt agreeing with treatment plan.    Nasal congestion: nasal sprays scripts sent to pt's pharmacy. Pt to use for 2 weeks, if still having issues will refer to ENT. Pt agreeing with treatment plan.    Ingrown toe nail: no infection noted, tender to palpation, discussed prevention methods from getting ingrown toenail, pt declined podiatrist referral at this time, will monitor.     Dry eye medication switched to prn per pt's request.      Return for keep appts in December..

## 2022-08-04 NOTE — PATIENT INSTRUCTIONS
RLS: will increase wellbutrin from 150 mg once a day to 150 mg twice a day; continue without propranolol-taken off med list. Recommend stretching exercises for legs daily.    Positive cologaurd testing results: referral to GI for colonoscopy, pt agreeing with treatment plan.    Nasal congestion: nasal sprays scripts sent to pt's pharmacy. Pt to use for 2 weeks, if still having issues will refer to ENT. Pt agreeing with treatment plan.    Ingrown toe nail: no infection noted, tender to palpation, discussed prevention methods from getting ingrown toenail, pt declined podiatrist referral at this time, will monitor.     Dry eye medication switched to prn per pt's request.

## 2022-08-12 ENCOUNTER — TELEPHONE (OUTPATIENT)
Dept: FAMILY MEDICINE CLINIC | Facility: CLINIC | Age: 62
End: 2022-08-12

## 2022-08-12 NOTE — TELEPHONE ENCOUNTER
Caller: [facility]     Relationship: Other    Best call back number:   [phone]    What is the best time to reach you: ANYTIME     Who are you requesting to speak with (clinical staff, provider,  specific staff member): CLINICAL STAFF     Do you know the name of the person who called: TU    What was the call regarding: TU IS CALLING TO CHECK THE STATUS OF AN EXISTING REFERRAL FOR A COLONOSCOPY.     TU IS ALSO REQUESTING APPOINTMENT NOTES FOR THE PATIENTS MOST RECENT VISIT.     PLEASE ADVISE     Do you require a callback: YES

## 2022-08-15 ENCOUNTER — TELEPHONE (OUTPATIENT)
Dept: GASTROENTEROLOGY | Facility: CLINIC | Age: 62
End: 2022-08-15

## 2022-08-15 NOTE — TELEPHONE ENCOUNTER
[facility] called (LEELEE) asking when they will receive paperwork to complete so that Rafita can be scheduled for a CLS.  Please contact Leelee @ [phone] Thank you.

## 2022-08-15 NOTE — TELEPHONE ENCOUNTER
lvm for Leelee. Made her aware that fast track was mailed to pt home. Also advised pt can call and I will schedule him

## 2022-08-17 ENCOUNTER — PREP FOR SURGERY (OUTPATIENT)
Dept: SURGERY | Facility: SURGERY CENTER | Age: 62
End: 2022-08-17

## 2022-08-17 DIAGNOSIS — Z12.11 ENCOUNTER FOR SCREENING FOR MALIGNANT NEOPLASM OF COLON: Primary | ICD-10-CM

## 2022-08-17 DIAGNOSIS — R19.5 POSITIVE COLORECTAL CANCER SCREENING USING COLOGUARD TEST: ICD-10-CM

## 2022-08-17 RX ORDER — SODIUM CHLORIDE, SODIUM LACTATE, POTASSIUM CHLORIDE, CALCIUM CHLORIDE 600; 310; 30; 20 MG/100ML; MG/100ML; MG/100ML; MG/100ML
30 INJECTION, SOLUTION INTRAVENOUS CONTINUOUS PRN
Status: CANCELLED | OUTPATIENT
Start: 2022-08-17

## 2022-08-25 ENCOUNTER — TELEPHONE (OUTPATIENT)
Dept: GASTROENTEROLOGY | Facility: CLINIC | Age: 62
End: 2022-08-25

## 2022-08-25 PROBLEM — R19.5 POSITIVE COLORECTAL CANCER SCREENING USING COLOGUARD TEST: Status: ACTIVE | Noted: 2022-08-25

## 2022-08-25 PROBLEM — Z12.11 ENCOUNTER FOR SCREENING FOR MALIGNANT NEOPLASM OF COLON: Status: ACTIVE | Noted: 2022-08-25

## 2022-08-25 NOTE — TELEPHONE ENCOUNTER
----- Message from Alex Fernández sent at 8/25/2022 11:36 AM EDT -----  Regarding: SURGERY CLEARANCE SCANNED IN THE CHART

## 2022-08-25 NOTE — TELEPHONE ENCOUNTER
Left a message for Leelee  (caregiver) that patient has been cleared to hold his plavix 5 days prior to 11/9 colonoscopy / arrival time 10:30

## 2022-09-29 ENCOUNTER — OFFICE VISIT (OUTPATIENT)
Dept: FAMILY MEDICINE CLINIC | Facility: CLINIC | Age: 62
End: 2022-09-29

## 2022-09-29 ENCOUNTER — TELEPHONE (OUTPATIENT)
Dept: FAMILY MEDICINE CLINIC | Facility: CLINIC | Age: 62
End: 2022-09-29

## 2022-09-29 VITALS
BODY MASS INDEX: 23.73 KG/M2 | DIASTOLIC BLOOD PRESSURE: 66 MMHG | SYSTOLIC BLOOD PRESSURE: 104 MMHG | TEMPERATURE: 96.6 F | RESPIRATION RATE: 18 BRPM | OXYGEN SATURATION: 97 % | HEART RATE: 83 BPM | HEIGHT: 68 IN

## 2022-09-29 DIAGNOSIS — J02.9 ACUTE PHARYNGITIS, UNSPECIFIED ETIOLOGY: ICD-10-CM

## 2022-09-29 DIAGNOSIS — R05.9 COUGH: ICD-10-CM

## 2022-09-29 DIAGNOSIS — J06.9 ACUTE URI: Primary | ICD-10-CM

## 2022-09-29 LAB
EXPIRATION DATE: NORMAL
INTERNAL CONTROL: NORMAL
Lab: NORMAL
S PYO AG THROAT QL: NEGATIVE

## 2022-09-29 PROCEDURE — 87880 STREP A ASSAY W/OPTIC: CPT | Performed by: NURSE PRACTITIONER

## 2022-09-29 PROCEDURE — 99213 OFFICE O/P EST LOW 20 MIN: CPT | Performed by: NURSE PRACTITIONER

## 2022-09-29 RX ORDER — BENZONATATE 100 MG/1
100 CAPSULE ORAL 3 TIMES DAILY PRN
Qty: 30 CAPSULE | Refills: 0 | Status: SHIPPED | OUTPATIENT
Start: 2022-09-29 | End: 2022-12-15

## 2022-09-29 RX ORDER — AZELASTINE 1 MG/ML
1 SPRAY, METERED NASAL 2 TIMES DAILY
Qty: 1 EACH | Refills: 0 | Status: SHIPPED | OUTPATIENT
Start: 2022-09-29 | End: 2022-10-13

## 2022-09-29 RX ORDER — FLUTICASONE PROPIONATE 50 MCG
2 SPRAY, SUSPENSION (ML) NASAL DAILY
Qty: 16 G | Refills: 0 | Status: SHIPPED | OUTPATIENT
Start: 2022-09-29 | End: 2022-12-15

## 2022-09-29 RX ORDER — PREDNISONE 20 MG/1
20 TABLET ORAL 2 TIMES DAILY
Qty: 8 TABLET | Refills: 0 | Status: SHIPPED | OUTPATIENT
Start: 2022-09-29 | End: 2022-10-03

## 2022-09-29 NOTE — TELEPHONE ENCOUNTER
Caller: [facility]    Relationship: Emergency Contact    Best call back number: [phone] (M)    What is the best time to reach you: ANYTIME     What was the call regarding: PATIENT NURSE CALLED AND WAS EXPRESSING HER FRUSTRATION TOWARDS ME JUST WANTING TO TELL ME THAT SHE NEEDS [facility] TO HAVE PERMISSION FOR CARE. I DID NOT UNDERSTAND WHAT SHE WAS TRYING TO EXPLAIN TO ME THEN HUNG UP     Do you require a callback: YES

## 2022-09-29 NOTE — PROGRESS NOTES
Subjective     Rafita Davis is a 62 y.o.. male.     Pt here today wih Maryann, [facility]    Cough  This is a new problem. Episode onset: 3 weeks. The problem has been unchanged. The cough is productive of sputum. Associated symptoms include postnasal drip, rhinorrhea, a sore throat and shortness of breath (at times). Pertinent negatives include no ear pain, fever or headaches.       The following portions of the patient's history were reviewed and updated as appropriate: allergies, current medications, past family history, past medical history, past social history, past surgical history and problem list.    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   • Bipolar affective disorder in remission (HCC)    • Failure to thrive in adult    • Impaired cognition    • Injury of back    • Intracranial hemorrhage (HCC)     non-traumatic   • Kidney stones    • Memory impairment    • Recurrent major depressive disorder, in remission (HCC)    • Seizures (HCC)    • Stroke (HCC)    • Traumatic brain injury (HCC)    • Urine retention        Past Surgical History:   Procedure Laterality Date   • BACK SURGERY     • BRAIN SURGERY     • KIDNEY STONE SURGERY     • URETEROSCOPY LASER LITHOTRIPSY WITH STENT INSERTION N/A 04/28/2020   •  SHUNT INSERTION         Review of Systems   Constitutional: Negative for fever.   HENT: Positive for congestion, postnasal drip, rhinorrhea and sore throat. Negative for ear pain.    Respiratory: Positive for cough (productive) and shortness of breath (at times).    Gastrointestinal: Positive for diarrhea. Negative for nausea and vomiting.   Neurological: Positive for dizziness (at times). Negative for headaches.       No Known Allergies    Objective     Vitals:    09/29/22 1120   BP: 104/66   BP Location: Right arm   Patient Position:  "Sitting   Pulse: 83   Resp: 18   Temp: 96.6 °F (35.9 °C)   TempSrc: Oral   SpO2: 97%   Height: 172.7 cm (67.99\")     Body mass index is 23.73 kg/m².    Physical Exam  Vitals reviewed.   Constitutional:       Appearance: He is well-developed.   HENT:      Head: Normocephalic and atraumatic.      Right Ear: A middle ear effusion (clear fluid noted) is present. Tympanic membrane is not erythematous.      Left Ear: A middle ear effusion (clear fluid noted) is present. Tympanic membrane is not erythematous.      Nose: Congestion present.      Mouth/Throat:      Mouth: Mucous membranes are moist.      Pharynx: Oropharyngeal exudate (clear pnd) and posterior oropharyngeal erythema (slight) present. No pharyngeal swelling.   Eyes:      Conjunctiva/sclera: Conjunctivae normal.      Pupils: Pupils are equal, round, and reactive to light.   Cardiovascular:      Rate and Rhythm: Normal rate and regular rhythm.      Heart sounds: No murmur heard.  Pulmonary:      Effort: Pulmonary effort is normal. No accessory muscle usage or respiratory distress.      Breath sounds: No wheezing, rhonchi or rales.   Musculoskeletal:         General: Normal range of motion.   Lymphadenopathy:      Cervical: No cervical adenopathy.   Skin:     General: Skin is warm and dry.   Neurological:      Mental Status: He is alert and oriented to person, place, and time.           Current Outpatient Medications:   •  acetaminophen (TYLENOL) 500 MG tablet, acetaminophen 500 mg tablet  TAKE TWO TABLETS BY MOUTH EVERY 6 HOURS AS NEEDED FOR PAIN. MAX SIX TABLETS EVERY 24 HOURS, Disp: , Rfl:   •  amantadine (SYMMETREL) 100 MG capsule, Take 1 capsule by mouth 2 (Two) Times a Day. At 8 AM and 12 Noon for neurostimulation, Disp: , Rfl:   •  ARIPiprazole (ABILIFY) 15 MG tablet, Take 15 mg by mouth Daily., Disp: , Rfl:   •  buPROPion SR (Wellbutrin SR) 150 MG 12 hr tablet, Take 1 tablet by mouth 2 (Two) Times a Day., Disp: 180 tablet, Rfl: 1  •  clopidogrel (PLAVIX) " 75 MG tablet, 1 tablet by Per G Tube route Daily., Disp: 30 tablet, Rfl:   •  finasteride (PROSCAR) 5 MG tablet, finasteride 5 mg tablet  TAKE ONE TABLET BY MOUTH EVERY DAY, Disp: , Rfl:   •  folic acid (FOLVITE) 400 MCG tablet, Take 400 mcg by mouth Daily., Disp: , Rfl:   •  hydrocortisone 1 % ointment, Apply 1 application topically to the appropriate area as directed 2 (Two) Times a Day As Needed., Disp: , Rfl:   •  levothyroxine (SYNTHROID, LEVOTHROID) 50 MCG tablet, Take 50 mcg by mouth Daily., Disp: , Rfl:   •  Melatonin 10 MG tablet, melatonin 10 mg tablet  TAKE ONE TABLET BY MOUTH AT BEDTIME, Disp: , Rfl:   •  multivitamin (THERAGRAN) tablet tablet, Take  by mouth Daily., Disp: , Rfl:   •  omeprazole (priLOSEC) 40 MG capsule, , Disp: , Rfl:   •  Refresh Optive 0.5-0.9 % solution, Apply 1 drop to eye(s) as directed by provider 3 (Three) Times a Day As Needed (dry eyes)., Disp: 15 mL, Rfl: 0  •  rivastigmine (EXELON) 1.5 MG capsule, Take 1.5 mg by mouth 2 (Two) Times a Day., Disp: , Rfl:   •  tamsulosin (FLOMAX) 0.4 MG capsule 24 hr capsule, Take 1 capsule by mouth every night at bedtime., Disp: , Rfl:   •  azelastine (ASTELIN) 0.1 % nasal spray, 1 spray into the nostril(s) as directed by provider 2 (Two) Times a Day for 14 days. Use in each nostril as directed, Disp: 1 each, Rfl: 0  •  benzonatate (Tessalon Perles) 100 MG capsule, Take 1 capsule by mouth 3 (Three) Times a Day As Needed for Cough., Disp: 30 capsule, Rfl: 0  •  fluticasone (FLONASE) 50 MCG/ACT nasal spray, 2 sprays into the nostril(s) as directed by provider Daily for 14 days., Disp: 16 g, Rfl: 0  •  predniSONE (DELTASONE) 20 MG tablet, Take 1 tablet by mouth 2 (Two) Times a Day for 4 days., Disp: 8 tablet, Rfl: 0    Recent Results (from the past 168 hour(s))   POCT rapid strep A    Collection Time: 09/29/22 12:01 PM    Specimen: Swab   Result Value Ref Range    Rapid Strep A Screen Negative Negative, VALID, INVALID, Not Performed    Internal  Control Passed Passed    Lot Number [id number]     Expiration Date 9/30/2023            Diagnoses and all orders for this visit:    1. Acute URI (Primary)  -     fluticasone (FLONASE) 50 MCG/ACT nasal spray; 2 sprays into the nostril(s) as directed by provider Daily for 14 days.  Dispense: 16 g; Refill: 0  -     azelastine (ASTELIN) 0.1 % nasal spray; 1 spray into the nostril(s) as directed by provider 2 (Two) Times a Day for 14 days. Use in each nostril as directed  Dispense: 1 each; Refill: 0    2. Acute pharyngitis, unspecified etiology  -     POCT rapid strep A    3. Cough  -     COVID-19,[facility] ROUTINE, NP/OP SWAB IN TRANSPORT MEDIA OR ESWAB 72 HR TAT - Swab, Nasopharynx  -     predniSONE (DELTASONE) 20 MG tablet; Take 1 tablet by mouth 2 (Two) Times a Day for 4 days.  Dispense: 8 tablet; Refill: 0  -     benzonatate (Tessalon Perles) 100 MG capsule; Take 1 capsule by mouth 3 (Three) Times a Day As Needed for Cough.  Dispense: 30 capsule; Refill: 0        Patient Instructions   Drink plenty of fluids-water preferably, eat a heart healthy diet, get plenty of sleep and do warm salt water gargles twice a day until feeling better; pt informed to stay in quarantine until results of covid19 testing back. Pt verb. Understanding.       Return if symptoms worsen or fail to improve.

## 2022-09-30 LAB
LABCORP SARS-COV-2, NAA 2 DAY TAT: NORMAL
SARS-COV-2 RNA RESP QL NAA+PROBE: NOT DETECTED

## 2022-09-30 NOTE — TELEPHONE ENCOUNTER
I SPOKE TO TU HERRERA SHE IS PT NURSE AT THE FACILITY HE LIVES AT. PT GAVE PERMISSION FOR HER TO RECEIVE COVID RESULTS ONCE THEY COMEBACK BECAUSE HE IS READY TO COME OUT OF QUARANTINE. HE WOULD LIKE THOSE SENT TO HER EMAIL [email or web address]

## 2022-10-03 ENCOUNTER — TELEPHONE (OUTPATIENT)
Dept: FAMILY MEDICINE CLINIC | Facility: CLINIC | Age: 62
End: 2022-10-03

## 2022-10-03 NOTE — TELEPHONE ENCOUNTER
Caller: Rafita Davis    Relationship: Self    Best call back number: [phone] CHARLIE (CAREGIVER/NOT ON [facility] VERBAL)    What test was performed: COVID    When was the test performed: 9/29    Where was the test performed: IN OFFICE    Additional notes: PLEASE CALL ASAP. THEY STATE THEY WAITED ALL WEEKEND AND THE RESULTS WERE SUPPOSE TO BE EMAILED BUT NEVER THEY NEVER GOT THEM.

## 2022-11-08 ENCOUNTER — ANESTHESIA EVENT (OUTPATIENT)
Dept: PERIOP | Facility: HOSPITAL | Age: 62
End: 2022-11-08

## 2022-11-09 ENCOUNTER — HOSPITAL ENCOUNTER (OUTPATIENT)
Facility: HOSPITAL | Age: 62
Setting detail: HOSPITAL OUTPATIENT SURGERY
Discharge: HOME | End: 2022-11-09
Attending: INTERNAL MEDICINE | Admitting: INTERNAL MEDICINE

## 2022-11-09 ENCOUNTER — ANESTHESIA (OUTPATIENT)
Dept: PERIOP | Facility: HOSPITAL | Age: 62
End: 2022-11-09

## 2022-11-09 VITALS
DIASTOLIC BLOOD PRESSURE: 85 MMHG | RESPIRATION RATE: 16 BRPM | BODY MASS INDEX: 23.63 KG/M2 | WEIGHT: 155.4 LBS | SYSTOLIC BLOOD PRESSURE: 124 MMHG | TEMPERATURE: 97.8 F | HEART RATE: 51 BPM | OXYGEN SATURATION: 97 %

## 2022-11-09 DIAGNOSIS — Z12.11 ENCOUNTER FOR SCREENING FOR MALIGNANT NEOPLASM OF COLON: ICD-10-CM

## 2022-11-09 DIAGNOSIS — R19.5 POSITIVE COLORECTAL CANCER SCREENING USING COLOGUARD TEST: ICD-10-CM

## 2022-11-09 PROCEDURE — 88305 TISSUE EXAM BY PATHOLOGIST: CPT | Performed by: INTERNAL MEDICINE

## 2022-11-09 PROCEDURE — 25010000002 PROPOFOL 200 MG/20ML EMULSION: Performed by: NURSE ANESTHETIST, CERTIFIED REGISTERED

## 2022-11-09 PROCEDURE — 45385 COLONOSCOPY W/LESION REMOVAL: CPT | Performed by: INTERNAL MEDICINE

## 2022-11-09 PROCEDURE — 45380 COLONOSCOPY AND BIOPSY: CPT | Performed by: INTERNAL MEDICINE

## 2022-11-09 RX ORDER — SODIUM CHLORIDE, SODIUM LACTATE, POTASSIUM CHLORIDE, CALCIUM CHLORIDE 600; 310; 30; 20 MG/100ML; MG/100ML; MG/100ML; MG/100ML
30 INJECTION, SOLUTION INTRAVENOUS CONTINUOUS PRN
Status: DISCONTINUED | OUTPATIENT
Start: 2022-11-09 | End: 2022-11-09 | Stop reason: HOSPADM

## 2022-11-09 RX ORDER — SODIUM CHLORIDE 0.9 % (FLUSH) 0.9 %
10 SYRINGE (ML) INJECTION AS NEEDED
Status: DISCONTINUED | OUTPATIENT
Start: 2022-11-09 | End: 2022-11-09 | Stop reason: HOSPADM

## 2022-11-09 RX ORDER — LIDOCAINE HYDROCHLORIDE 10 MG/ML
0.5 INJECTION, SOLUTION EPIDURAL; INFILTRATION; INTRACAUDAL; PERINEURAL ONCE AS NEEDED
Status: COMPLETED | OUTPATIENT
Start: 2022-11-09 | End: 2022-11-09

## 2022-11-09 RX ORDER — PROPOFOL 10 MG/ML
INJECTION, EMULSION INTRAVENOUS AS NEEDED
Status: DISCONTINUED | OUTPATIENT
Start: 2022-11-09 | End: 2022-11-09 | Stop reason: SURG

## 2022-11-09 RX ORDER — SODIUM CHLORIDE 9 MG/ML
40 INJECTION, SOLUTION INTRAVENOUS AS NEEDED
Status: DISCONTINUED | OUTPATIENT
Start: 2022-11-09 | End: 2022-11-09 | Stop reason: HOSPADM

## 2022-11-09 RX ORDER — ONDANSETRON 2 MG/ML
4 INJECTION INTRAMUSCULAR; INTRAVENOUS ONCE AS NEEDED
Status: DISCONTINUED | OUTPATIENT
Start: 2022-11-09 | End: 2022-11-09 | Stop reason: HOSPADM

## 2022-11-09 RX ORDER — DIPHENHYDRAMINE HYDROCHLORIDE 50 MG/ML
12.5 INJECTION INTRAMUSCULAR; INTRAVENOUS
Status: DISCONTINUED | OUTPATIENT
Start: 2022-11-09 | End: 2022-11-09 | Stop reason: HOSPADM

## 2022-11-09 RX ORDER — SODIUM CHLORIDE, SODIUM LACTATE, POTASSIUM CHLORIDE, CALCIUM CHLORIDE 600; 310; 30; 20 MG/100ML; MG/100ML; MG/100ML; MG/100ML
9 INJECTION, SOLUTION INTRAVENOUS CONTINUOUS
Status: DISCONTINUED | OUTPATIENT
Start: 2022-11-09 | End: 2022-11-09 | Stop reason: HOSPADM

## 2022-11-09 RX ORDER — MAGNESIUM HYDROXIDE 1200 MG/15ML
LIQUID ORAL AS NEEDED
Status: DISCONTINUED | OUTPATIENT
Start: 2022-11-09 | End: 2022-11-09 | Stop reason: HOSPADM

## 2022-11-09 RX ORDER — SODIUM CHLORIDE 0.9 % (FLUSH) 0.9 %
10 SYRINGE (ML) INJECTION EVERY 12 HOURS SCHEDULED
Status: DISCONTINUED | OUTPATIENT
Start: 2022-11-09 | End: 2022-11-09 | Stop reason: HOSPADM

## 2022-11-09 RX ORDER — SODIUM CHLORIDE, SODIUM LACTATE, POTASSIUM CHLORIDE, CALCIUM CHLORIDE 600; 310; 30; 20 MG/100ML; MG/100ML; MG/100ML; MG/100ML
100 INJECTION, SOLUTION INTRAVENOUS CONTINUOUS
Status: DISCONTINUED | OUTPATIENT
Start: 2022-11-09 | End: 2022-11-09 | Stop reason: HOSPADM

## 2022-11-09 RX ADMIN — SODIUM CHLORIDE, POTASSIUM CHLORIDE, SODIUM LACTATE AND CALCIUM CHLORIDE 9 ML/HR: 600; 310; 30; 20 INJECTION, SOLUTION INTRAVENOUS at 10:59

## 2022-11-09 RX ADMIN — LIDOCAINE HYDROCHLORIDE 50 MG: 10 INJECTION, SOLUTION EPIDURAL; INFILTRATION; INTRACAUDAL; PERINEURAL at 11:32

## 2022-11-09 RX ADMIN — PROPOFOL 200 MG: 10 INJECTION, EMULSION INTRAVENOUS at 11:32

## 2022-11-09 NOTE — ANESTHESIA POSTPROCEDURE EVALUATION
Patient: Rafita Davis    Procedure Summary     Date: 11/09/22 Room / Location: [facility] ENDOSCOPY 2 /  [facility] OR    Anesthesia Start: 1130 Anesthesia Stop: 1151    Procedure: COLONOSCOPY FOR SCREENING Diagnosis:       Encounter for screening for malignant neoplasm of colon      Positive colorectal cancer screening using Cologuard test      (Encounter for screening for malignant neoplasm of colon [Z12.11])      (Positive colorectal cancer screening using Cologuard test [R19.5])    Surgeons: Finesse Constantino MD Provider: Neil Patiño CRNA    Anesthesia Type: MAC ASA Status: 3          Anesthesia Type: MAC    Vitals  Vitals Value Taken Time   /69 11/09/22 1200   Temp     Pulse 54 11/09/22 1200   Resp 15 11/09/22 1148   SpO2 96 % 11/09/22 1200           Post Anesthesia Care and Evaluation    Patient location during evaluation: bedside  Patient participation: complete - patient participated  Level of consciousness: awake and alert  Pain score: 0  Pain management: adequate    Airway patency: patent  Anesthetic complications: No anesthetic complications  PONV Status: none  Cardiovascular status: acceptable  Respiratory status: acceptable  Hydration status: acceptable

## 2022-11-09 NOTE — ANESTHESIA PREPROCEDURE EVALUATION
Anesthesia Evaluation     Patient summary reviewed and Nursing notes reviewed   NPO Solid Status: > 8 hours  NPO Liquid Status: > 8 hours           Airway   Mallampati: II  TM distance: >3 FB  No difficulty expected  Dental    (+) poor dentition        Pulmonary - normal exam   (+) a smoker (1ppd ) Current Smoked day of surgery,   Cardiovascular - normal exam  Exercise tolerance: good (4-7 METS)    PT is on anticoagulation therapy        Neuro/Psych  (+) seizures well controlled, CVA, headaches, tremors, psychiatric history Bipolar, dementia, poor historian.,      ROS Comment: HX Intracranial hemorrhage (HCC)    Impaired cognition  Aggressive behavior of adult physical and verbal; at times per documentation of [facility]      SHUNT INSERTION Hydrocephalus,   GI/Hepatic/Renal/Endo    (+)  GERD well controlled,  renal disease, thyroid problem hypothyroidism    Musculoskeletal (-) negative ROS    Abdominal  - normal exam   Substance History   (-) drug use     OB/GYN          Other - negative ROS                       Anesthesia Plan    ASA 3     MAC     intravenous induction     Anesthetic plan, risks, benefits, and alternatives have been provided, discussed and informed consent has been obtained with: patient.    Use of blood products discussed with patient  Consented to blood products.       CODE STATUS:

## 2022-11-09 NOTE — H&P
No chief complaint on file.      HPI  Patient today for a colonoscopy for screening.  He had a positive Cologuard test.         Problem List:    Patient Active Problem List   Diagnosis   • Subarachnoid bleed (HCC)   • Subarachnoid hemorrhage (HCC)   • Oropharyngeal dysphagia   • Seizure after head injury (HCC)   • Protein-calorie malnutrition, moderate (HCC)   • Bladder stone   • Essential tremor   • Bipolar disorder (HCC)   • Bradycardia   • Intracranial hemorrhage (HCC)   • Cigarette smoker   • Dementia associated with other underlying disease with behavioral disturbance   • External hemorrhoids   • Gastroesophageal reflux disease without esophagitis   • Headache   • Hydrocephalus, communicating (HCC)   • Kidney stones   • Memory impairment   • Persistent insomnia   • Recurrent major depression (HCC)   • S/P ventriculoperitoneal shunt   • 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)   • Tremor   • Encounter for screening for malignant neoplasm of colon   • Positive colorectal cancer screening using Cologuard test       Medical History:    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   • Bipolar affective disorder in remission (HCC)    • Disease of thyroid gland    • Failure to thrive in adult    • GERD (gastroesophageal reflux disease)    • Impaired cognition    • Injury of back    • Intracranial hemorrhage (HCC)     non-traumatic   • Kidney stones    • Memory impairment    • Recurrent major depressive disorder, in remission (HCC)    • Seizures (HCC)    • Stroke (HCC)    • Traumatic brain injury    • Urine retention         Social History:    Social History     Socioeconomic History   • Marital status:    Tobacco Use   • Smoking status: Every Day     Packs/day: 1.00     Years: 45.00     Pack years: 45.00     Types:  Cigarettes   • Smokeless tobacco: Never   • Tobacco comments:     caffeine use 2 cups coffee daily & 1 soda daily   Vaping Use   • Vaping Use: Never used   Substance and Sexual Activity   • Alcohol use: No   • Drug use: Never   • Sexual activity: Defer       Family History:   Family History   Problem Relation Age of Onset   • Aneurysm Mother    • Mental illness Father    • Mental illness Brother    • Mental illness Son         schizophrenia       Surgical History:   Past Surgical History:   Procedure Laterality Date   • BACK SURGERY     • BRAIN SURGERY     • KIDNEY STONE SURGERY     • URETEROSCOPY LASER LITHOTRIPSY WITH STENT INSERTION N/A 04/28/2020   •  SHUNT INSERTION           Current Facility-Administered Medications:   •  lactated ringers infusion, 9 mL/hr, Intravenous, Continuous, Leelee Gonzales MD, Last Rate: 9 mL/hr at 11/09/22 1059, 9 mL/hr at 11/09/22 1059  •  lactated ringers infusion, 30 mL/hr, Intravenous, Continuous PRN, Finesse Constantino MD  •  lidocaine PF 1% (XYLOCAINE) injection 0.5 mL, 0.5 mL, Injection, Once PRN, Leelee Gonzales MD  •  sodium chloride 0.9 % flush 10 mL, 10 mL, Intravenous, PRN, Leelee Gonzales MD  •  sodium chloride 0.9 % flush 10 mL, 10 mL, Intravenous, Q12H, Leelee Gonzales MD  •  sodium chloride 0.9 % infusion 40 mL, 40 mL, Intravenous, PRN, Leelee Gonzales MD    Allergies: No Known Allergies       Review of Systems   Pertinent items are noted in HPI      The following portions of the patient's history were reviewed by me and updated as appropriate: review of systems, allergies, current medications, past family history, past medical history, past social history, past surgical history and problem list.    /84 (BP Location: Left arm, Patient Position: Lying)   Pulse 66   Temp 97.8 °F (36.6 °C) (Oral)   Resp 12   Wt 70.5 kg (155 lb 6.4 oz)   SpO2 96%   BMI 23.63 kg/m²     PHYSICAL EXAM:    CONSTITUTIONAL:  today's vital signs reviewed by me  GASTROINTESTINAL: abdomen is soft  nontender nondistended with normal active bowel sounds, no masses are appreciated    Debilities: None  Emotional Behavior: Appropriate    Assessment/ Plan  We will proceed with colonoscopy.    Risks and benefits as well as alternatives to endoscopic evaluation were explained to the patient and they voiced understanding and wish to proceed.  These risks include but are not limited to the risk of bleeding, perforation, adverse reaction to sedation, and missed lesions.  The patient was given the opportunity to ask questions prior to the endoscopic procedure.

## 2022-11-11 LAB
LAB AP CASE REPORT: NORMAL
LAB AP CLINICAL INFORMATION: NORMAL
PATH REPORT.FINAL DX SPEC: NORMAL
PATH REPORT.GROSS SPEC: NORMAL

## 2022-12-06 DIAGNOSIS — E78.2 MIXED HYPERLIPIDEMIA: ICD-10-CM

## 2022-12-06 DIAGNOSIS — R73.9 ELEVATED BLOOD SUGAR: ICD-10-CM

## 2022-12-14 LAB
ALBUMIN SERPL-MCNC: 4.2 G/DL (ref 3.8–4.8)
ALBUMIN/GLOB SERPL: 1.8 {RATIO} (ref 1.2–2.2)
ALP SERPL-CCNC: 95 IU/L (ref 44–121)
ALT SERPL-CCNC: 7 IU/L (ref 0–44)
AST SERPL-CCNC: 14 IU/L (ref 0–40)
BASOPHILS # BLD AUTO: 0.1 X10E3/UL (ref 0–0.2)
BASOPHILS NFR BLD AUTO: 1 %
BILIRUB SERPL-MCNC: 0.3 MG/DL (ref 0–1.2)
BUN SERPL-MCNC: 16 MG/DL (ref 8–27)
BUN/CREAT SERPL: 14 (ref 10–24)
CALCIUM SERPL-MCNC: 9.4 MG/DL (ref 8.6–10.2)
CHLORIDE SERPL-SCNC: 106 MMOL/L (ref 96–106)
CHOLEST SERPL-MCNC: 197 MG/DL (ref 100–199)
CO2 SERPL-SCNC: 23 MMOL/L (ref 20–29)
CREAT SERPL-MCNC: 1.13 MG/DL (ref 0.76–1.27)
EGFRCR SERPLBLD CKD-EPI 2021: 73 ML/MIN/1.73
EOSINOPHIL # BLD AUTO: 0.2 X10E3/UL (ref 0–0.4)
EOSINOPHIL NFR BLD AUTO: 2 %
ERYTHROCYTE [DISTWIDTH] IN BLOOD BY AUTOMATED COUNT: 13.2 % (ref 11.6–15.4)
GLOBULIN SER CALC-MCNC: 2.4 G/DL (ref 1.5–4.5)
GLUCOSE SERPL-MCNC: 85 MG/DL (ref 70–99)
HBA1C MFR BLD: 5.3 % (ref 4.8–5.6)
HCT VFR BLD AUTO: 42.5 % (ref 37.5–51)
HDLC SERPL-MCNC: 45 MG/DL
HGB BLD-MCNC: 14.1 G/DL (ref 13–17.7)
IMM GRANULOCYTES # BLD AUTO: 0 X10E3/UL (ref 0–0.1)
IMM GRANULOCYTES NFR BLD AUTO: 0 %
LDLC SERPL CALC-MCNC: 120 MG/DL (ref 0–99)
LDLC/HDLC SERPL: 2.7 RATIO (ref 0–3.6)
LYMPHOCYTES # BLD AUTO: 1.4 X10E3/UL (ref 0.7–3.1)
LYMPHOCYTES NFR BLD AUTO: 21 %
MCH RBC QN AUTO: 32.3 PG (ref 26.6–33)
MCHC RBC AUTO-ENTMCNC: 33.2 G/DL (ref 31.5–35.7)
MCV RBC AUTO: 98 FL (ref 79–97)
MONOCYTES # BLD AUTO: 0.6 X10E3/UL (ref 0.1–0.9)
MONOCYTES NFR BLD AUTO: 9 %
NEUTROPHILS # BLD AUTO: 4.6 X10E3/UL (ref 1.4–7)
NEUTROPHILS NFR BLD AUTO: 67 %
PLATELET # BLD AUTO: 336 X10E3/UL (ref 150–450)
POTASSIUM SERPL-SCNC: 5.1 MMOL/L (ref 3.5–5.2)
PROT SERPL-MCNC: 6.6 G/DL (ref 6–8.5)
RBC # BLD AUTO: 4.36 X10E6/UL (ref 4.14–5.8)
SODIUM SERPL-SCNC: 143 MMOL/L (ref 134–144)
TRIGL SERPL-MCNC: 180 MG/DL (ref 0–149)
VLDLC SERPL CALC-MCNC: 32 MG/DL (ref 5–40)
WBC # BLD AUTO: 6.9 X10E3/UL (ref 3.4–10.8)

## 2022-12-15 ENCOUNTER — OFFICE VISIT (OUTPATIENT)
Dept: FAMILY MEDICINE CLINIC | Facility: CLINIC | Age: 62
End: 2022-12-15

## 2022-12-15 VITALS
WEIGHT: 159.4 LBS | HEIGHT: 68 IN | OXYGEN SATURATION: 97 % | SYSTOLIC BLOOD PRESSURE: 98 MMHG | HEART RATE: 87 BPM | TEMPERATURE: 98.6 F | RESPIRATION RATE: 18 BRPM | BODY MASS INDEX: 24.16 KG/M2 | DIASTOLIC BLOOD PRESSURE: 60 MMHG

## 2022-12-15 DIAGNOSIS — J10.1 INFLUENZA A: ICD-10-CM

## 2022-12-15 DIAGNOSIS — G25.81 RLS (RESTLESS LEGS SYNDROME): ICD-10-CM

## 2022-12-15 DIAGNOSIS — E78.1 HYPERTRIGLYCERIDEMIA: ICD-10-CM

## 2022-12-15 DIAGNOSIS — E78.00 ELEVATED LDL CHOLESTEROL LEVEL: Primary | ICD-10-CM

## 2022-12-15 DIAGNOSIS — R05.1 ACUTE COUGH: ICD-10-CM

## 2022-12-15 PROBLEM — E07.9 THYROID CONDITION: Status: ACTIVE | Noted: 2022-12-15

## 2022-12-15 PROBLEM — N52.9 ED (ERECTILE DYSFUNCTION): Status: ACTIVE | Noted: 2022-12-15

## 2022-12-15 LAB
EXPIRATION DATE: ABNORMAL
FLUAV AG NPH QL: POSITIVE
FLUBV AG NPH QL: NEGATIVE
INTERNAL CONTROL: ABNORMAL
Lab: ABNORMAL

## 2022-12-15 PROCEDURE — 87804 INFLUENZA ASSAY W/OPTIC: CPT | Performed by: NURSE PRACTITIONER

## 2022-12-15 PROCEDURE — 99214 OFFICE O/P EST MOD 30 MIN: CPT | Performed by: NURSE PRACTITIONER

## 2022-12-15 RX ORDER — OSELTAMIVIR PHOSPHATE 75 MG/1
75 CAPSULE ORAL 2 TIMES DAILY
Qty: 10 CAPSULE | Refills: 0 | Status: SHIPPED | OUTPATIENT
Start: 2022-12-15 | End: 2022-12-20

## 2022-12-15 RX ORDER — FLUTICASONE PROPIONATE 50 MCG
SPRAY, SUSPENSION (ML) NASAL
COMMUNITY
End: 2022-12-15

## 2022-12-15 RX ORDER — AZELASTINE 1 MG/ML
1 SPRAY, METERED NASAL 2 TIMES DAILY
Qty: 1 EACH | Refills: 0 | Status: SHIPPED | OUTPATIENT
Start: 2022-12-15 | End: 2022-12-29

## 2022-12-15 RX ORDER — SILDENAFIL 25 MG/1
TABLET, FILM COATED ORAL
COMMUNITY
End: 2022-12-15

## 2022-12-15 RX ORDER — DIAPER,BRIEF,INFANT-TODD,DISP
EACH MISCELLANEOUS
COMMUNITY
Start: 2022-10-04 | End: 2022-12-15

## 2022-12-15 RX ORDER — ASTRAGALUS ROOT 470 MG
CAPSULE ORAL
COMMUNITY
Start: 2022-11-09

## 2022-12-15 RX ORDER — ASPIRIN 81 MG
TABLET,CHEWABLE ORAL
COMMUNITY
Start: 2022-11-09

## 2022-12-15 RX ORDER — DEXTROMETHORPHAN HYDROBROMIDE AND PROMETHAZINE HYDROCHLORIDE 15; 6.25 MG/5ML; MG/5ML
5 SYRUP ORAL 3 TIMES DAILY PRN
Qty: 150 ML | Refills: 0 | Status: SHIPPED | OUTPATIENT
Start: 2022-12-15

## 2022-12-15 NOTE — PATIENT INSTRUCTIONS
Recent labs reviewed with pt during visit    Elevated LDL/hypertrigylceridemia: recommend eating a heart healthy low fat diet; recommend drinking water through out day and to exercise 3-5 times a week for more than 30 minutes at a time.    RLS: continue Wellbutrin 150 mg 1 tab twice a day, recommend doing stretching exercises for legs daily.    Influ A/cough:Drink plenty of fluids-water preferably, eat a heart healthy diet, get plenty of sleep and do warm salt water gargles twice a day until feeling better; pt informed to stay in quarantine for 5 days after onset of symptoms. Pt verb. Understanding.

## 2022-12-15 NOTE — PROGRESS NOTES
Subjective     Rafita Davis is a 62 y.o.. male.     Pt here today for follow up on bradycardia, RLS, elevated LDL and trigyceridemia. Pt stating he is drinking fluids, some water, eating healthy, eats more sweets than he should and denies exercising. On last office visit pt's wellbutrin increased to 150 mg twice a day. Pt stating his RLS has improved and denies issues at this time. Also recommend pt start stretching legs to help with RLS which pt denies starting this.     Pt having colonoscopy on 11/9/22 by Dr. Constantino with results of multiple polyps, diverticula and internal hemrroids.     Pt started feeling bad 2 days ago, pt was covid tested yesterday and was negative.     Cough  This is a new problem. Episode onset: 2 days. Associated symptoms include headaches, postnasal drip and rhinorrhea. Pertinent negatives include no ear pain, fever, sore throat or shortness of breath.       The following portions of the patient's history were reviewed and updated as appropriate: allergies, current medications, past family history, past medical history, past social history, past surgical history and problem list.    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   • Bipolar affective disorder in remission (HCC)    • Disease of thyroid gland    • Failure to thrive in adult    • GERD (gastroesophageal reflux disease)    • Impaired cognition    • Injury of back    • Intracranial hemorrhage (HCC)     non-traumatic   • Kidney stones    • Memory impairment    • Recurrent major depressive disorder, in remission (HCC)    • Seizures (HCC)    • Stroke (HCC)    • Traumatic brain injury    • Urine retention        Past Surgical History:   Procedure Laterality Date   • BACK SURGERY     • BRAIN SURGERY     • COLONOSCOPY W/ POLYPECTOMY N/A 11/9/2022    Procedure:  "COLONOSCOPY FOR SCREENING;  Surgeon: Finesse Constantino MD;  Location: [facility];  Service: Gastroenterology;  Laterality: N/A;  diverticulosis, polyps: sigmoid x3, rectal x1   • KIDNEY STONE SURGERY     • URETEROSCOPY LASER LITHOTRIPSY WITH STENT INSERTION N/A 04/28/2020   •  SHUNT INSERTION         Review of Systems   Constitutional: Positive for fatigue. Negative for fever.   HENT: Positive for congestion, postnasal drip and rhinorrhea. Negative for ear pain and sore throat.    Respiratory: Positive for cough. Negative for shortness of breath.    Gastrointestinal: Negative for diarrhea, nausea and vomiting.   Musculoskeletal:        RLS improved   Neurological: Positive for headaches. Negative for tremors (improved, none today).       No Known Allergies    Objective     Vitals:    12/15/22 0840   BP: 98/60   Pulse: 87   Resp: 18   Temp: 98.6 °F (37 °C)   TempSrc: Temporal   SpO2: 97%   Weight: 72.3 kg (159 lb 6.4 oz)   Height: 172.7 cm (67.99\")     Body mass index is 24.24 kg/m².    Physical Exam  Vitals reviewed.   Constitutional:       Appearance: He is well-developed.   HENT:      Head: Normocephalic and atraumatic.      Right Ear: A middle ear effusion (clear fluid) is present. Tympanic membrane is not erythematous.      Left Ear: Tympanic membrane normal. Tympanic membrane is not erythematous.      Nose: Congestion present.      Right Sinus: No maxillary sinus tenderness or frontal sinus tenderness.      Left Sinus: No maxillary sinus tenderness or frontal sinus tenderness.      Mouth/Throat:      Mouth: Mucous membranes are moist.      Pharynx: Oropharyngeal exudate (clear pnd) present. No pharyngeal swelling or posterior oropharyngeal erythema.   Eyes:      Conjunctiva/sclera: Conjunctivae normal.      Pupils: Pupils are equal, round, and reactive to light.   Neck:      Vascular: No carotid bruit.   Cardiovascular:      Rate and Rhythm: Normal rate and regular rhythm.      Pulses: Normal pulses.      " Heart sounds: No murmur heard.  Pulmonary:      Effort: Pulmonary effort is normal. No accessory muscle usage or respiratory distress.      Breath sounds: No stridor. No decreased breath sounds, wheezing, rhonchi or rales.   Musculoskeletal:         General: Normal range of motion.      Right lower leg: No edema.      Left lower leg: No edema.   Lymphadenopathy:      Cervical: No cervical adenopathy.   Skin:     General: Skin is warm and dry.   Neurological:      Mental Status: He is alert and oriented to person, place, and time.      Comments: No tremors noted today           Current Outpatient Medications:   •  acetaminophen (TYLENOL) 500 MG tablet, acetaminophen 500 mg tablet  TAKE TWO TABLETS BY MOUTH EVERY 6 HOURS AS NEEDED FOR PAIN. MAX SIX TABLETS EVERY 24 HOURS, Disp: , Rfl:   •  ARIPiprazole (ABILIFY) 15 MG tablet, Take 15 mg by mouth Daily., Disp: , Rfl:   •  buPROPion SR (Wellbutrin SR) 150 MG 12 hr tablet, Take 1 tablet by mouth 2 (Two) Times a Day., Disp: 180 tablet, Rfl: 1  •  clopidogrel (PLAVIX) 75 MG tablet, 1 tablet by Per G Tube route Daily., Disp: 30 tablet, Rfl:   •  finasteride (PROSCAR) 5 MG tablet, finasteride 5 mg tablet  TAKE ONE TABLET BY MOUTH EVERY DAY, Disp: , Rfl:   •  folic acid (FOLVITE) 400 MCG tablet, Take 400 mcg by mouth Daily., Disp: , Rfl:   •  hydrocortisone 1 % ointment, Apply 1 application topically to the appropriate area as directed 2 (Two) Times a Day As Needed., Disp: , Rfl:   •  levothyroxine (SYNTHROID, LEVOTHROID) 50 MCG tablet, Take 50 mcg by mouth Daily., Disp: , Rfl:   •  Melatonin 10 MG tablet, melatonin 10 mg tablet  TAKE ONE TABLET BY MOUTH AT BEDTIME, Disp: , Rfl:   •  multivitamin (THERAGRAN) tablet tablet, Take  by mouth Daily., Disp: , Rfl:   •  omeprazole (priLOSEC) 40 MG capsule, , Disp: , Rfl:   •  Refresh Optive 0.5-0.9 % solution, Apply 1 drop to eye(s) as directed by provider 3 (Three) Times a Day As Needed (dry eyes)., Disp: 15 mL, Rfl: 0  •   rivastigmine (EXELON) 1.5 MG capsule, Take 1.5 mg by mouth 2 (Two) Times a Day., Disp: , Rfl:   •  tamsulosin (FLOMAX) 0.4 MG capsule 24 hr capsule, Take 1 capsule by mouth every night at bedtime., Disp: , Rfl:   •  Aspirin Low Dose 81 MG chewable tablet, CHEW AND SWALLOW ONE TABLET BY MOUTH DAILY, Disp: , Rfl:   •  azelastine (ASTELIN) 0.1 % nasal spray, 1 spray into the nostril(s) as directed by provider 2 (Two) Times a Day for 14 days. Use in each nostril as directed, Disp: 1 each, Rfl: 0  •  B-12 Microlozenge 500 MCG sublingual tablet, PLACE ONE TABLET UNDER THE TONGUE EVERY DAY, Disp: , Rfl:   •  oseltamivir (Tamiflu) 75 MG capsule, Take 1 capsule by mouth 2 (Two) Times a Day for 5 days., Disp: 10 capsule, Rfl: 0  •  promethazine-dextromethorphan (PROMETHAZINE-DM) 6.25-15 MG/5ML syrup, Take 5 mL by mouth 3 (Three) Times a Day As Needed for Cough., Disp: 150 mL, Rfl: 0    Recent Results (from the past 672 hour(s))   Hemoglobin A1c    Collection Time: 12/13/22  8:51 AM    Specimen: Blood   Result Value Ref Range    Hemoglobin A1C 5.3 4.8 - 5.6 %   CBC & Differential    Collection Time: 12/13/22  8:51 AM    Specimen: Blood   Result Value Ref Range    WBC 6.9 3.4 - 10.8 x10E3/uL    RBC 4.36 4.14 - 5.80 x10E6/uL    Hemoglobin 14.1 13.0 - 17.7 g/dL    Hematocrit 42.5 37.5 - 51.0 %    MCV 98 (H) 79 - 97 fL    MCH 32.3 26.6 - 33.0 pg    MCHC 33.2 31.5 - 35.7 g/dL    RDW 13.2 11.6 - 15.4 %    Platelets 336 150 - 450 x10E3/uL    Neutrophil Rel % 67 Not Estab. %    Lymphocyte Rel % 21 Not Estab. %    Monocyte Rel % 9 Not Estab. %    Eosinophil Rel % 2 Not Estab. %    Basophil Rel % 1 Not Estab. %    Neutrophils Absolute 4.6 1.4 - 7.0 x10E3/uL    Lymphocytes Absolute 1.4 0.7 - 3.1 x10E3/uL    Monocytes Absolute 0.6 0.1 - 0.9 x10E3/uL    Eosinophils Absolute 0.2 0.0 - 0.4 x10E3/uL    Basophils Absolute 0.1 0.0 - 0.2 x10E3/uL    Immature Granulocyte Rel % 0 Not Estab. %    Immature Grans Absolute 0.0 0.0 - 0.1 x10E3/uL    Lipid Panel With LDL / HDL Ratio    Collection Time: 12/13/22  8:51 AM    Specimen: Blood   Result Value Ref Range    Total Cholesterol 197 100 - 199 mg/dL    Triglycerides 180 (H) 0 - 149 mg/dL    HDL Cholesterol 45 >39 mg/dL    VLDL Cholesterol David 32 5 - 40 mg/dL    LDL Chol Calc (NIH) 120 (H) 0 - 99 mg/dL    LDL/HDL RATIO 2.7 0.0 - 3.6 ratio   Comprehensive Metabolic Panel    Collection Time: 12/13/22  8:53 AM    Specimen: Blood   Result Value Ref Range    Glucose 85 70 - 99 mg/dL    BUN 16 8 - 27 mg/dL    Creatinine 1.13 0.76 - 1.27 mg/dL    EGFR Result 73 >59 mL/min/1.73    BUN/Creatinine Ratio 14 10 - 24    Sodium 143 134 - 144 mmol/L    Potassium 5.1 3.5 - 5.2 mmol/L    Chloride 106 96 - 106 mmol/L    Total CO2 23 20 - 29 mmol/L    Calcium 9.4 8.6 - 10.2 mg/dL    Total Protein 6.6 6.0 - 8.5 g/dL    Albumin 4.2 3.8 - 4.8 g/dL    Globulin 2.4 1.5 - 4.5 g/dL    A/G Ratio 1.8 1.2 - 2.2    Total Bilirubin 0.3 0.0 - 1.2 mg/dL    Alkaline Phosphatase 95 44 - 121 IU/L    AST (SGOT) 14 0 - 40 IU/L    ALT (SGPT) 7 0 - 44 IU/L   POCT Influenza A/B    Collection Time: 12/15/22  9:04 AM    Specimen: Swab   Result Value Ref Range    Rapid Influenza A Ag Positive (A) Negative    Rapid Influenza B Ag Negative Negative    Internal Control Passed Passed    Lot Number [id number]     Expiration Date 10/821/2023            Diagnoses and all orders for this visit:    1. Elevated LDL cholesterol level (Primary)    2. Hypertriglyceridemia    3. RLS (restless legs syndrome)    4. Influenza A  -     oseltamivir (Tamiflu) 75 MG capsule; Take 1 capsule by mouth 2 (Two) Times a Day for 5 days.  Dispense: 10 capsule; Refill: 0    5. Acute cough  -     POCT Influenza A/B  -     azelastine (ASTELIN) 0.1 % nasal spray; 1 spray into the nostril(s) as directed by provider 2 (Two) Times a Day for 14 days. Use in each nostril as directed  Dispense: 1 each; Refill: 0  -     promethazine-dextromethorphan (PROMETHAZINE-DM) 6.25-15 MG/5ML syrup;  Take 5 mL by mouth 3 (Three) Times a Day As Needed for Cough.  Dispense: 150 mL; Refill: 0        Patient Instructions   Recent labs reviewed with pt during visit    Elevated LDL/hypertrigylceridemia: recommend eating a heart healthy low fat diet; recommend drinking water through out day and to exercise 3-5 times a week for more than 30 minutes at a time.    RLS: continue Wellbutrin 150 mg 1 tab twice a day, recommend doing stretching exercises for legs daily.    Influ A/cough:Drink plenty of fluids-water preferably, eat a heart healthy diet, get plenty of sleep and do warm salt water gargles twice a day until feeling better; pt informed to stay in quarantine for 5 days after onset of symptoms. Pt verb. Understanding.         Return for Annual physical.

## 2023-01-23 NOTE — NURSING NOTE
Pt's ex wife iNsreen Davis called in and prefers to go over the admission database when she comes in to visit the patient tomorrow afternoon.    show

## 2023-01-31 DIAGNOSIS — G25.81 RESTLESS LEG SYNDROME: ICD-10-CM

## 2023-01-31 RX ORDER — BUPROPION HYDROCHLORIDE 150 MG/1
150 TABLET, EXTENDED RELEASE ORAL 2 TIMES DAILY
Qty: 180 TABLET | Refills: 1 | Status: SHIPPED | OUTPATIENT
Start: 2023-01-31

## 2023-01-31 NOTE — TELEPHONE ENCOUNTER
Caller: UT HERRERA    Relationship: Emergency Contact    Best call back number: [phone]    Requested Prescriptions:   Requested Prescriptions     Pending Prescriptions Disp Refills   • buPROPion SR (Wellbutrin SR) 150 MG 12 hr tablet 180 tablet 1     Sig: Take 1 tablet by mouth 2 (Two) Times a Day.        Pharmacy where request should be sent: [address]. [facility]. [address] - [phone]  - [phone]     Additional details provided by patient: PATIENT HAS 2 TABLETS LEFT OF MEDICATION     Does the patient have less than a 3 day supply:  [x] Yes  [] No    Would you like a call back once the refill request has been completed: [] Yes [x] No    If the office needs to give you a call back, can they leave a voicemail: [] Yes [x] No    Alex Roth Rep   01/31/23 09:37 EST

## 2023-05-31 DIAGNOSIS — G25.81 RESTLESS LEG SYNDROME: ICD-10-CM

## 2023-05-31 RX ORDER — BUPROPION HYDROCHLORIDE 150 MG/1
TABLET, EXTENDED RELEASE ORAL
Qty: 180 TABLET | Refills: 1 | Status: SHIPPED | OUTPATIENT
Start: 2023-05-31

## 2023-06-15 DIAGNOSIS — E78.2 MIXED HYPERLIPIDEMIA: ICD-10-CM

## 2023-06-15 DIAGNOSIS — Z00.00 ANNUAL PHYSICAL EXAM: Primary | ICD-10-CM

## 2023-06-15 DIAGNOSIS — Z13.29 SCREENING FOR THYROID DISORDER: ICD-10-CM

## 2023-06-15 DIAGNOSIS — R73.01 ABNORMAL FASTING GLUCOSE: ICD-10-CM

## 2023-06-15 DIAGNOSIS — Z12.5 SCREENING PSA (PROSTATE SPECIFIC ANTIGEN): ICD-10-CM

## 2023-06-20 LAB
ALBUMIN SERPL-MCNC: 4.1 G/DL (ref 3.5–5.2)
ALBUMIN/GLOB SERPL: 2.1 G/DL
ALP SERPL-CCNC: 89 U/L (ref 39–117)
ALT SERPL-CCNC: 9 U/L (ref 1–41)
APPEARANCE UR: ABNORMAL
AST SERPL-CCNC: 13 U/L (ref 1–40)
BACTERIA #/AREA URNS HPF: ABNORMAL /HPF
BASOPHILS # BLD AUTO: 0.05 10*3/MM3 (ref 0–0.2)
BASOPHILS NFR BLD AUTO: 0.7 % (ref 0–1.5)
BILIRUB SERPL-MCNC: 0.2 MG/DL (ref 0–1.2)
BILIRUB UR QL STRIP: NEGATIVE
BUN SERPL-MCNC: 12 MG/DL (ref 8–23)
BUN/CREAT SERPL: 11 (ref 7–25)
CALCIUM SERPL-MCNC: 9.4 MG/DL (ref 8.6–10.5)
CASTS URNS MICRO: ABNORMAL
CHLORIDE SERPL-SCNC: 106 MMOL/L (ref 98–107)
CHOLEST SERPL-MCNC: 184 MG/DL (ref 0–200)
CO2 SERPL-SCNC: 25 MMOL/L (ref 22–29)
COLOR UR: ABNORMAL
CREAT SERPL-MCNC: 1.09 MG/DL (ref 0.76–1.27)
CRYSTALS URNS MICRO: ABNORMAL
EGFRCR SERPLBLD CKD-EPI 2021: 75.8 ML/MIN/1.73
EOSINOPHIL # BLD AUTO: 0.12 10*3/MM3 (ref 0–0.4)
EOSINOPHIL NFR BLD AUTO: 1.8 % (ref 0.3–6.2)
EPI CELLS #/AREA URNS HPF: ABNORMAL /HPF
ERYTHROCYTE [DISTWIDTH] IN BLOOD BY AUTOMATED COUNT: 13 % (ref 12.3–15.4)
GLOBULIN SER CALC-MCNC: 2 GM/DL
GLUCOSE SERPL-MCNC: 94 MG/DL (ref 65–99)
GLUCOSE UR QL STRIP: NEGATIVE
HBA1C MFR BLD: 4.9 % (ref 4.8–5.6)
HCT VFR BLD AUTO: 41.8 % (ref 37.5–51)
HDLC SERPL-MCNC: 46 MG/DL (ref 40–60)
HGB BLD-MCNC: 13.9 G/DL (ref 13–17.7)
HGB UR QL STRIP: ABNORMAL
IMM GRANULOCYTES # BLD AUTO: 0.03 10*3/MM3 (ref 0–0.05)
IMM GRANULOCYTES NFR BLD AUTO: 0.4 % (ref 0–0.5)
KETONES UR QL STRIP: ABNORMAL
LDLC SERPL CALC-MCNC: 113 MG/DL (ref 0–100)
LDLC/HDLC SERPL: 2.38 {RATIO}
LEUKOCYTE ESTERASE UR QL STRIP: NEGATIVE
LYMPHOCYTES # BLD AUTO: 1.78 10*3/MM3 (ref 0.7–3.1)
LYMPHOCYTES NFR BLD AUTO: 26.6 % (ref 19.6–45.3)
MCH RBC QN AUTO: 33.2 PG (ref 26.6–33)
MCHC RBC AUTO-ENTMCNC: 33.3 G/DL (ref 31.5–35.7)
MCV RBC AUTO: 99.8 FL (ref 79–97)
MONOCYTES # BLD AUTO: 0.64 10*3/MM3 (ref 0.1–0.9)
MONOCYTES NFR BLD AUTO: 9.6 % (ref 5–12)
MUCOUS THREADS URNS QL MICRO: ABNORMAL /HPF
NEUTROPHILS # BLD AUTO: 4.07 10*3/MM3 (ref 1.7–7)
NEUTROPHILS NFR BLD AUTO: 60.9 % (ref 42.7–76)
NITRITE UR QL STRIP: NEGATIVE
NRBC BLD AUTO-RTO: 0 /100 WBC (ref 0–0.2)
PH UR STRIP: 6 [PH] (ref 5–8)
PLATELET # BLD AUTO: 305 10*3/MM3 (ref 140–450)
POTASSIUM SERPL-SCNC: 5.1 MMOL/L (ref 3.5–5.2)
PROT SERPL-MCNC: 6.1 G/DL (ref 6–8.5)
PROT UR QL STRIP: ABNORMAL
PSA SERPL-MCNC: 0.81 NG/ML (ref 0–4)
RBC # BLD AUTO: 4.19 10*6/MM3 (ref 4.14–5.8)
RBC #/AREA URNS HPF: ABNORMAL /HPF
SODIUM SERPL-SCNC: 143 MMOL/L (ref 136–145)
SP GR UR STRIP: 1.02 (ref 1–1.03)
TRIGL SERPL-MCNC: 142 MG/DL (ref 0–150)
TSH SERPL DL<=0.005 MIU/L-ACNC: 3.66 UIU/ML (ref 0.27–4.2)
UROBILINOGEN UR STRIP-MCNC: ABNORMAL MG/DL
VLDLC SERPL CALC-MCNC: 25 MG/DL (ref 5–40)
WBC # BLD AUTO: 6.69 10*3/MM3 (ref 3.4–10.8)
WBC #/AREA URNS HPF: ABNORMAL /HPF

## 2023-08-16 ENCOUNTER — HOSPITAL ENCOUNTER (OUTPATIENT)
Dept: CT IMAGING | Facility: HOSPITAL | Age: 63
Discharge: HOME | End: 2023-08-16
Admitting: NURSE PRACTITIONER
Payer: MEDICARE

## 2023-08-16 DIAGNOSIS — Z12.2 SCREENING FOR LUNG CANCER: ICD-10-CM

## 2023-08-16 DIAGNOSIS — F17.210 CIGARETTE SMOKER: ICD-10-CM

## 2023-08-16 PROCEDURE — 71271 CT THORAX LUNG CANCER SCR C-: CPT

## 2023-08-18 PROBLEM — J43.9 EMPHYSEMA LUNG: Status: ACTIVE | Noted: 2023-08-18

## 2023-09-01 RX ORDER — AZELASTINE 1 MG/ML
1 SPRAY, METERED NASAL 2 TIMES DAILY
Qty: 5 ML | Refills: 0 | Status: SHIPPED | OUTPATIENT
Start: 2023-09-01 | End: 2023-09-15

## 2023-11-21 DIAGNOSIS — F17.210 CIGARETTE SMOKER: ICD-10-CM

## 2023-11-21 DIAGNOSIS — G25.81 RESTLESS LEG: ICD-10-CM

## 2023-11-21 NOTE — TELEPHONE ENCOUNTER
Rx Refill Note  Requested Prescriptions     Pending Prescriptions Disp Refills    buPROPion SR (Wellbutrin SR) 200 MG 12 hr tablet 180 tablet 1     Sig: Take 1 tablet by mouth 2 (Two) Times a Day.      Last office visit with prescribing clinician: 6/22/2023   Last telemedicine visit with prescribing clinician: Visit date not found   Next office visit with prescribing clinician: 1/3/2024                         Would you like a call back once the refill request has been completed: [] Yes [] No    If the office needs to give you a call back, can they leave a voicemail: [] Yes [] No    Lora Thorne MA  11/21/23, 11:03 EST

## 2023-11-27 RX ORDER — BUPROPION HYDROCHLORIDE 200 MG/1
200 TABLET, EXTENDED RELEASE ORAL 2 TIMES DAILY
Qty: 180 TABLET | Refills: 1 | Status: SHIPPED | OUTPATIENT
Start: 2023-11-27

## 2023-11-30 DIAGNOSIS — G25.81 RESTLESS LEG: ICD-10-CM

## 2023-11-30 DIAGNOSIS — F17.210 CIGARETTE SMOKER: ICD-10-CM

## 2023-11-30 RX ORDER — BUPROPION HYDROCHLORIDE 200 MG/1
200 TABLET, EXTENDED RELEASE ORAL 2 TIMES DAILY
Qty: 180 TABLET | Refills: 1 | OUTPATIENT
Start: 2023-11-30

## 2023-11-30 NOTE — TELEPHONE ENCOUNTER
Rx Refill Note  Requested Prescriptions     Pending Prescriptions Disp Refills    buPROPion SR (Wellbutrin SR) 200 MG 12 hr tablet 180 tablet 1     Sig: Take 1 tablet by mouth 2 (Two) Times a Day.      Last office visit with prescribing clinician: 6/22/2023   Last telemedicine visit with prescribing clinician: Visit date not found   Next office visit with prescribing clinician: 1/3/2024                         Would you like a call back once the refill request has been completed: [] Yes [] No    If the office needs to give you a call back, can they leave a voicemail: [] Yes [] No    Lora Thorne MA  11/30/23, 08:19 EST

## 2023-12-04 ENCOUNTER — HOSPITAL ENCOUNTER (OUTPATIENT)
Facility: HOSPITAL | Age: 63
Setting detail: HOSPITAL OUTPATIENT SURGERY
Discharge: HOME | End: 2023-12-04
Attending: SURGERY | Admitting: SURGERY
Payer: MEDICARE

## 2023-12-04 ENCOUNTER — ANESTHESIA EVENT (OUTPATIENT)
Dept: PERIOP | Facility: HOSPITAL | Age: 63
End: 2023-12-04
Payer: MEDICARE

## 2023-12-04 ENCOUNTER — ANESTHESIA (OUTPATIENT)
Dept: PERIOP | Facility: HOSPITAL | Age: 63
End: 2023-12-04
Payer: MEDICARE

## 2023-12-04 VITALS
TEMPERATURE: 97.5 F | HEART RATE: 49 BPM | SYSTOLIC BLOOD PRESSURE: 130 MMHG | RESPIRATION RATE: 16 BRPM | BODY MASS INDEX: 22.73 KG/M2 | HEIGHT: 68 IN | WEIGHT: 150 LBS | DIASTOLIC BLOOD PRESSURE: 87 MMHG | OXYGEN SATURATION: 97 %

## 2023-12-04 DIAGNOSIS — N20.0 RIGHT NEPHROLITHIASIS: Primary | ICD-10-CM

## 2023-12-04 PROCEDURE — 25010000002 FENTANYL CITRATE (PF) 50 MCG/ML SOLUTION

## 2023-12-04 PROCEDURE — 25010000002 CEFAZOLIN IN DEXTROSE 2-4 GM/100ML-% SOLUTION: Performed by: SURGERY

## 2023-12-04 PROCEDURE — 25010000002 PROPOFOL 10 MG/ML EMULSION

## 2023-12-04 PROCEDURE — 25010000002 ONDANSETRON PER 1 MG

## 2023-12-04 PROCEDURE — 25810000003 LACTATED RINGERS PER 1000 ML: Performed by: STUDENT IN AN ORGANIZED HEALTH CARE EDUCATION/TRAINING PROGRAM

## 2023-12-04 PROCEDURE — 25010000002 DEXAMETHASONE SODIUM PHOSPHATE 20 MG/5ML SOLUTION

## 2023-12-04 RX ORDER — HYDROCODONE BITARTRATE AND ACETAMINOPHEN 7.5; 325 MG/1; MG/1
1 TABLET ORAL EVERY 4 HOURS PRN
Status: DISCONTINUED | OUTPATIENT
Start: 2023-12-04 | End: 2023-12-04 | Stop reason: HOSPADM

## 2023-12-04 RX ORDER — HYDROMORPHONE HYDROCHLORIDE 1 MG/ML
0.25 INJECTION, SOLUTION INTRAMUSCULAR; INTRAVENOUS; SUBCUTANEOUS
Status: DISCONTINUED | OUTPATIENT
Start: 2023-12-04 | End: 2023-12-04 | Stop reason: HOSPADM

## 2023-12-04 RX ORDER — NALOXONE HCL 0.4 MG/ML
0.2 VIAL (ML) INJECTION AS NEEDED
Status: DISCONTINUED | OUTPATIENT
Start: 2023-12-04 | End: 2023-12-04 | Stop reason: HOSPADM

## 2023-12-04 RX ORDER — SODIUM CHLORIDE 0.9 % (FLUSH) 0.9 %
3-10 SYRINGE (ML) INJECTION AS NEEDED
Status: DISCONTINUED | OUTPATIENT
Start: 2023-12-04 | End: 2023-12-04 | Stop reason: HOSPADM

## 2023-12-04 RX ORDER — LIDOCAINE HYDROCHLORIDE 20 MG/ML
INJECTION, SOLUTION INFILTRATION; PERINEURAL AS NEEDED
Status: DISCONTINUED | OUTPATIENT
Start: 2023-12-04 | End: 2023-12-04 | Stop reason: SURG

## 2023-12-04 RX ORDER — CEFAZOLIN SODIUM 2 G/100ML
2000 INJECTION, SOLUTION INTRAVENOUS ONCE
Status: COMPLETED | OUTPATIENT
Start: 2023-12-04 | End: 2023-12-04

## 2023-12-04 RX ORDER — DROPERIDOL 2.5 MG/ML
0.62 INJECTION, SOLUTION INTRAMUSCULAR; INTRAVENOUS
Status: DISCONTINUED | OUTPATIENT
Start: 2023-12-04 | End: 2023-12-04 | Stop reason: HOSPADM

## 2023-12-04 RX ORDER — ONDANSETRON 2 MG/ML
4 INJECTION INTRAMUSCULAR; INTRAVENOUS ONCE AS NEEDED
Status: COMPLETED | OUTPATIENT
Start: 2023-12-04 | End: 2023-12-04

## 2023-12-04 RX ORDER — SODIUM CHLORIDE, SODIUM LACTATE, POTASSIUM CHLORIDE, CALCIUM CHLORIDE 600; 310; 30; 20 MG/100ML; MG/100ML; MG/100ML; MG/100ML
9 INJECTION, SOLUTION INTRAVENOUS CONTINUOUS
Status: DISCONTINUED | OUTPATIENT
Start: 2023-12-04 | End: 2023-12-04 | Stop reason: HOSPADM

## 2023-12-04 RX ORDER — DIPHENHYDRAMINE HYDROCHLORIDE 50 MG/ML
12.5 INJECTION INTRAMUSCULAR; INTRAVENOUS
Status: DISCONTINUED | OUTPATIENT
Start: 2023-12-04 | End: 2023-12-04 | Stop reason: HOSPADM

## 2023-12-04 RX ORDER — SODIUM CHLORIDE 0.9 % (FLUSH) 0.9 %
3 SYRINGE (ML) INJECTION EVERY 12 HOURS SCHEDULED
Status: DISCONTINUED | OUTPATIENT
Start: 2023-12-04 | End: 2023-12-04 | Stop reason: HOSPADM

## 2023-12-04 RX ORDER — HYDROCODONE BITARTRATE AND ACETAMINOPHEN 5; 325 MG/1; MG/1
1 TABLET ORAL ONCE AS NEEDED
Status: COMPLETED | OUTPATIENT
Start: 2023-12-04 | End: 2023-12-04

## 2023-12-04 RX ORDER — ONDANSETRON 2 MG/ML
INJECTION INTRAMUSCULAR; INTRAVENOUS AS NEEDED
Status: DISCONTINUED | OUTPATIENT
Start: 2023-12-04 | End: 2023-12-04 | Stop reason: SURG

## 2023-12-04 RX ORDER — LABETALOL HYDROCHLORIDE 5 MG/ML
5 INJECTION, SOLUTION INTRAVENOUS
Status: DISCONTINUED | OUTPATIENT
Start: 2023-12-04 | End: 2023-12-04 | Stop reason: HOSPADM

## 2023-12-04 RX ORDER — PROMETHAZINE HYDROCHLORIDE 25 MG/1
25 TABLET ORAL ONCE AS NEEDED
Status: DISCONTINUED | OUTPATIENT
Start: 2023-12-04 | End: 2023-12-04 | Stop reason: HOSPADM

## 2023-12-04 RX ORDER — HYDROCODONE BITARTRATE AND ACETAMINOPHEN 5; 325 MG/1; MG/1
1 TABLET ORAL EVERY 6 HOURS PRN
Qty: 8 TABLET | Refills: 0 | Status: SHIPPED | OUTPATIENT
Start: 2023-12-04

## 2023-12-04 RX ORDER — FLUMAZENIL 0.1 MG/ML
0.2 INJECTION INTRAVENOUS AS NEEDED
Status: DISCONTINUED | OUTPATIENT
Start: 2023-12-04 | End: 2023-12-04 | Stop reason: HOSPADM

## 2023-12-04 RX ORDER — PROPOFOL 10 MG/ML
VIAL (ML) INTRAVENOUS AS NEEDED
Status: DISCONTINUED | OUTPATIENT
Start: 2023-12-04 | End: 2023-12-04 | Stop reason: SURG

## 2023-12-04 RX ORDER — GLYCOPYRROLATE 0.2 MG/ML
INJECTION INTRAMUSCULAR; INTRAVENOUS AS NEEDED
Status: DISCONTINUED | OUTPATIENT
Start: 2023-12-04 | End: 2023-12-04 | Stop reason: SURG

## 2023-12-04 RX ORDER — DEXAMETHASONE SODIUM PHOSPHATE 4 MG/ML
INJECTION, SOLUTION INTRA-ARTICULAR; INTRALESIONAL; INTRAMUSCULAR; INTRAVENOUS; SOFT TISSUE AS NEEDED
Status: DISCONTINUED | OUTPATIENT
Start: 2023-12-04 | End: 2023-12-04 | Stop reason: SURG

## 2023-12-04 RX ORDER — EPHEDRINE SULFATE 50 MG/ML
5 INJECTION, SOLUTION INTRAVENOUS ONCE AS NEEDED
Status: DISCONTINUED | OUTPATIENT
Start: 2023-12-04 | End: 2023-12-04 | Stop reason: HOSPADM

## 2023-12-04 RX ORDER — PROMETHAZINE HYDROCHLORIDE 25 MG/1
25 SUPPOSITORY RECTAL ONCE AS NEEDED
Status: DISCONTINUED | OUTPATIENT
Start: 2023-12-04 | End: 2023-12-04 | Stop reason: HOSPADM

## 2023-12-04 RX ORDER — FENTANYL CITRATE 50 UG/ML
25 INJECTION, SOLUTION INTRAMUSCULAR; INTRAVENOUS
Status: DISCONTINUED | OUTPATIENT
Start: 2023-12-04 | End: 2023-12-04 | Stop reason: HOSPADM

## 2023-12-04 RX ORDER — LIDOCAINE HYDROCHLORIDE 10 MG/ML
0.5 INJECTION, SOLUTION INFILTRATION; PERINEURAL ONCE AS NEEDED
Status: DISCONTINUED | OUTPATIENT
Start: 2023-12-04 | End: 2023-12-04 | Stop reason: HOSPADM

## 2023-12-04 RX ORDER — MIDAZOLAM HYDROCHLORIDE 1 MG/ML
1 INJECTION INTRAMUSCULAR; INTRAVENOUS
Status: DISCONTINUED | OUTPATIENT
Start: 2023-12-04 | End: 2023-12-04 | Stop reason: HOSPADM

## 2023-12-04 RX ORDER — FENTANYL CITRATE 50 UG/ML
INJECTION, SOLUTION INTRAMUSCULAR; INTRAVENOUS AS NEEDED
Status: DISCONTINUED | OUTPATIENT
Start: 2023-12-04 | End: 2023-12-04 | Stop reason: SURG

## 2023-12-04 RX ORDER — HYDRALAZINE HYDROCHLORIDE 20 MG/ML
5 INJECTION INTRAMUSCULAR; INTRAVENOUS
Status: DISCONTINUED | OUTPATIENT
Start: 2023-12-04 | End: 2023-12-04 | Stop reason: HOSPADM

## 2023-12-04 RX ORDER — IPRATROPIUM BROMIDE AND ALBUTEROL SULFATE 2.5; .5 MG/3ML; MG/3ML
3 SOLUTION RESPIRATORY (INHALATION) ONCE AS NEEDED
Status: DISCONTINUED | OUTPATIENT
Start: 2023-12-04 | End: 2023-12-04 | Stop reason: HOSPADM

## 2023-12-04 RX ADMIN — ONDANSETRON 4 MG: 2 INJECTION INTRAMUSCULAR; INTRAVENOUS at 14:07

## 2023-12-04 RX ADMIN — GLYCOPYRROLATE 0.2 MG: 0.2 INJECTION INTRAMUSCULAR; INTRAVENOUS at 13:03

## 2023-12-04 RX ADMIN — DEXAMETHASONE SODIUM PHOSPHATE 8 MG: 4 INJECTION, SOLUTION INTRAMUSCULAR; INTRAVENOUS at 13:10

## 2023-12-04 RX ADMIN — ONDANSETRON 4 MG: 2 INJECTION INTRAMUSCULAR; INTRAVENOUS at 13:37

## 2023-12-04 RX ADMIN — LIDOCAINE HYDROCHLORIDE 70 MG: 20 INJECTION, SOLUTION INFILTRATION; PERINEURAL at 13:03

## 2023-12-04 RX ADMIN — SODIUM CHLORIDE, POTASSIUM CHLORIDE, SODIUM LACTATE AND CALCIUM CHLORIDE 9 ML/HR: 600; 310; 30; 20 INJECTION, SOLUTION INTRAVENOUS at 12:55

## 2023-12-04 RX ADMIN — PROPOFOL 200 MG: 10 INJECTION, EMULSION INTRAVENOUS at 13:03

## 2023-12-04 RX ADMIN — CEFAZOLIN SODIUM 2000 MG: 2 INJECTION, SOLUTION INTRAVENOUS at 12:54

## 2023-12-04 RX ADMIN — HYDROCODONE BITARTRATE AND ACETAMINOPHEN 1 TABLET: 5; 325 TABLET ORAL at 14:08

## 2023-12-04 RX ADMIN — FENTANYL CITRATE 50 MCG: 50 INJECTION, SOLUTION INTRAMUSCULAR; INTRAVENOUS at 13:25

## 2023-12-04 NOTE — OP NOTE
Operative Note    12/4/2023    Rafita Davis  63 y.o.  1960  male  [id number]    Attending Surgeon:   Unruly Wall MD    Assistant(s): None    Pre-operative Diagnosis:   1. Right Nephrolithiasis    Post-operative Diagnosis: Same    Procedure:   1. Right extracorporeal shock wave lithotripsy (Staged procedure)    Indication:  Symptomatic right nephrolithiasis, imaging confirmed. Primary Urologist is Dr. Wall.    We discussed the benefits/risks/expectations and the patient desires surgical intervention. Risks include bleeding, infection, urinary tract infection/sepsis, retained stone fragments, damage to adjacent tissues including the genitalia, chronic pain, numbness, incontinence, stroke, heart attack, death, and need for additional procedures.    Findings:   Fluoroscopy demonstrated a radio-opaque stone at the lower pole of the renal shadow, consistent with patient's known stone burden.     Stones fragmented well. 3000 shocks were administered at a max energy level of 6.    Description of procedure:  After informed consent was obtained, the patient was taken to the OR and general anesthesia was induced. The patient was placed on the lithotripter table in a supine position and placed under general anesthesia. A timeout was performed and all team members were in agreement. Fluoroscopy demonstrated a radio-opaque stone at the lower pole of the renal shadow, consistent with patient's known stone burden.     Right ESWL was then performed. A total of 3000 shocks were administered to the stone at a max energy level 7.  The stone fragmented well. The patient tolerated the procedure well and was awakened from anesthesia in the OR. The patient was transferred to the recovery room in stable condition.    Complications: None    Specimens: None    Estimated Blood Loss: Minimal    Disposition:  To PACU then home    Patient to follow up in 2-3 weeks with [facility] prior    Unruly Wall MD  [facility]

## 2023-12-04 NOTE — H&P
[facility] History & Physical      Patient Identification:  NAME:  Rafita Davis  Age:  63 y.o.   Sex:  male   :  1960   MRN:  [id number]       Chief complaint: Planned Procedure    History of present illness:  Rafita Davis is a 63 y.o. patient with Right nephrolithiasis. No changes since last seen in clinic.      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    Bipolar affective disorder in remission     Disease of thyroid gland     Failure to thrive in adult     GERD (gastroesophageal reflux disease)     Impaired cognition     Injury of back     Intracranial hemorrhage     non-traumatic    Kidney stones     Memory impairment     Recurrent major depressive disorder, in remission     Seizures     Stroke     Traumatic brain injury     Urine retention        Past surgical history:  Past Surgical History:   Procedure Laterality Date    BACK SURGERY      BRAIN SURGERY      COLONOSCOPY W/ POLYPECTOMY N/A 2022    Procedure: COLONOSCOPY FOR SCREENING;  Surgeon: Finesse Constantino MD;  Location: [facility];  Service: Gastroenterology;  Laterality: N/A;  diverticulosis, polyps: sigmoid x3, rectal x1    KIDNEY STONE SURGERY      URETEROSCOPY LASER LITHOTRIPSY WITH STENT INSERTION N/A 2020     SHUNT INSERTION         Allergies:  Patient has no known allergies.    Home medications:  Medications Prior to Admission   Medication Sig Dispense Refill Last Dose    Aspirin Low Dose 81 MG chewable tablet CHEW AND SWALLOW ONE TABLET BY MOUTH DAILY   2023    clopidogrel (PLAVIX) 75 MG tablet 1 tablet by Per G Tube route Daily. 30 tablet  2023    acetaminophen (TYLENOL) 500 MG tablet acetaminophen 500 mg tablet   TAKE TWO TABLETS BY MOUTH EVERY 6 HOURS AS NEEDED FOR PAIN. MAX SIX TABLETS EVERY 24 HOURS        ARIPiprazole (ABILIFY) 15 MG tablet Take 1 tablet by mouth Daily.       B-12 Microlozenge 500 MCG sublingual tablet PLACE ONE TABLET UNDER THE TONGUE EVERY DAY       buPROPion SR (Wellbutrin SR) 200 MG 12 hr tablet Take 1 tablet by mouth 2 (Two) Times a Day. 180 tablet 1     clonazePAM (KlonoPIN) 0.5 MG tablet Daily.       finasteride (PROSCAR) 5 MG tablet finasteride 5 mg tablet   TAKE ONE TABLET BY MOUTH EVERY DAY       folic acid (FOLVITE) 400 MCG tablet Take 1 tablet by mouth Daily.       hydrocortisone 1 % ointment Apply 1 application  topically to the appropriate area as directed 2 (Two) Times a Day As Needed.       levothyroxine (SYNTHROID, LEVOTHROID) 50 MCG tablet Take 1 tablet by mouth Daily.       Melatonin 10 MG tablet melatonin 10 mg tablet   TAKE ONE TABLET BY MOUTH AT BEDTIME       multivitamin (THERAGRAN) tablet tablet Take  by mouth Daily.       omeprazole (priLOSEC) 40 MG capsule        Refresh Optive 0.5-0.9 % solution Apply 1 drop to eye(s) as directed by provider 3 (Three) Times a Day As Needed (dry eyes). 15 mL 0     rivastigmine (EXELON) 1.5 MG capsule Take 1 capsule by mouth 2 (Two) Times a Day.       tamsulosin (FLOMAX) 0.4 MG capsule 24 hr capsule Take 1 capsule by mouth every night at bedtime.           Hospital medications:    No current facility-administered medications for this encounter.        Family history:  Family History   Problem Relation Age of Onset    Aneurysm Mother     Mental illness Father     Mental illness Brother     Mental illness Son         schizophrenia    Malig Hyperthermia Neg Hx        Social history:  Social History     Tobacco Use    Smoking status: Every Day     Packs/day: 1.00     Years: 45.00     Additional pack years: 0.00     Total pack years: 45.00     Types: Cigarettes    Smokeless tobacco: Never    Tobacco comments:     caffeine use 2 cups coffee daily & 1 soda daily   Vaping Use    Vaping Use: Never used   Substance Use Topics    Alcohol use: No    Drug  use: Never         Objective:  TMax 24 hours:   No data recorded.      Vitals Ranges:        Intake/Output Last 3 shifts:  No intake/output data recorded.      Results review:   I reviewed the patient's new clinical results.    Data review:  Lab Results (last 24 hours)       ** No results found for the last 24 hours. **             Imaging:  Imaging Results (Last 24 Hours)       ** No results found for the last 24 hours. **               Assessment:       * No active hospital problems. *      Right nephrolithiasis    Plan:     Proceed with Right ESWL    The risks, benefits and alternatives of the procedure were discussed in detail with the patient including risk of bleeding, infection, damage to surrounding structures, pain, erectile dysfunction, inability to clear the stone, inability to place stent requiring PCNT, need for more procedures, recurrence of the disease, need for dialysis, thromboembolism, MI, stroke, coma, and even death. The patient verbalized understanding and all questions were answered. They agreed to proceed with the procedure.    Pt has held his plavix >5 days    Unruly Wall MD  [facility]

## 2023-12-04 NOTE — ANESTHESIA PREPROCEDURE EVALUATION
Anesthesia Evaluation     Patient summary reviewed and Nursing notes reviewed   NPO Solid Status: > 8 hours  NPO Liquid Status: > 2 hours           Airway   Mallampati: II  TM distance: >3 FB  Neck ROM: full  Dental      Comment: Poor dentition - multiple missing teeth    Pulmonary    (+) a smoker Current, COPD mild,  Cardiovascular     (+) hyperlipidemia  (-) hypertension, valvular problems/murmurs, past MI, CAD, dysrhythmias, CHF, cardiac stents, CABG      Neuro/Psych  (+) seizures well controlled, headaches, tremors, psychiatric history Bipolar and Depression, dementia  (-) CVA (No residual symtoms)  GI/Hepatic/Renal/Endo    (+) GERD well controlled, renal disease- stones, thyroid problem hypothyroidism    Musculoskeletal (-) negative ROS    Abdominal    Substance History - negative use     OB/GYN          Other - negative ROS                   Anesthesia Plan    ASA 3     general     intravenous induction     Anesthetic plan, risks, benefits, and alternatives have been provided, discussed and informed consent has been obtained with: patient.    CODE STATUS:

## 2023-12-04 NOTE — ANESTHESIA POSTPROCEDURE EVALUATION
"Patient: Rafita ISAAC Susan    Procedure Summary       Date: 12/04/23 Room / Location: [facility] OR 03 / [facility] MAIN OR    Anesthesia Start: 1259 Anesthesia Stop: 1351    Procedure: RIGHT SHOCKWAVE LITHOTRIPSY (Right) Diagnosis:     Surgeons: Unruly Wall MD Provider: Preston Hurt MD    Anesthesia Type: general ASA Status: 3            Anesthesia Type: general    Vitals  Vitals Value Taken Time   /91 12/04/23 1415   Temp 36.4 °C (97.5 °F) 12/04/23 1350   Pulse 53 12/04/23 1421   Resp 16 12/04/23 1415   SpO2 98 % 12/04/23 1421   Vitals shown include unfiled device data.        Post Anesthesia Care and Evaluation    Patient location during evaluation: PACU  Patient participation: complete - patient participated  Level of consciousness: awake and alert  Pain management: adequate    Airway patency: patent  Anesthetic complications: No anesthetic complications  PONV Status: controlled  Cardiovascular status: acceptable and hemodynamically stable  Respiratory status: acceptable  Hydration status: acceptable    Comments: /87   Pulse (!) 49   Temp 36.4 °C (97.5 °F) (Oral)   Resp 16   Ht 172.7 cm (68\")   Wt 68 kg (150 lb)   SpO2 97%   BMI 22.81 kg/m²     "

## 2023-12-04 NOTE — NURSING NOTE
Spoke to Shantell Enciso, pt's nurse at facility, and reitterated when patient should resume plavix and that Brendan , has Norco for patient.

## 2023-12-04 NOTE — ANESTHESIA PROCEDURE NOTES
Airway  Urgency: elective    Date/Time: 12/4/2023 1:05 PM  Airway not difficult    General Information and Staff    Patient location during procedure: OR  Anesthesiologist: Preston Hurt MD  CRNA/CAA: Sheyla Garcia CRNA    Indications and Patient Condition  Indications for airway management: airway protection    Preoxygenated: yes  MILS not maintained throughout  Mask difficulty assessment: 1 - vent by mask    Final Airway Details  Final airway type: supraglottic airway      Successful airway: unique  Size 5     Number of attempts at approach: 1  Assessment: lips, teeth, and gum same as pre-op and atraumatic intubation

## 2024-05-07 DIAGNOSIS — G25.81 RESTLESS LEG: ICD-10-CM

## 2024-05-07 DIAGNOSIS — F17.210 CIGARETTE SMOKER: ICD-10-CM

## 2024-05-07 RX ORDER — BUPROPION HYDROCHLORIDE 200 MG/1
200 TABLET, EXTENDED RELEASE ORAL 2 TIMES DAILY
Qty: 180 TABLET | Refills: 0 | Status: SHIPPED | OUTPATIENT
Start: 2024-05-07

## 2025-06-15 ENCOUNTER — HOSPITAL ENCOUNTER (EMERGENCY)
Facility: HOSPITAL | Age: 65
Discharge: HOME | End: 2025-06-15
Attending: STUDENT IN AN ORGANIZED HEALTH CARE EDUCATION/TRAINING PROGRAM | Admitting: STUDENT IN AN ORGANIZED HEALTH CARE EDUCATION/TRAINING PROGRAM
Payer: MEDICARE

## 2025-06-15 ENCOUNTER — APPOINTMENT (OUTPATIENT)
Dept: GENERAL RADIOLOGY | Facility: HOSPITAL | Age: 65
End: 2025-06-15
Payer: MEDICARE

## 2025-06-15 VITALS
DIASTOLIC BLOOD PRESSURE: 80 MMHG | HEIGHT: 68 IN | TEMPERATURE: 97.6 F | HEART RATE: 59 BPM | SYSTOLIC BLOOD PRESSURE: 148 MMHG | BODY MASS INDEX: 24.25 KG/M2 | OXYGEN SATURATION: 96 % | RESPIRATION RATE: 16 BRPM | WEIGHT: 160 LBS

## 2025-06-15 DIAGNOSIS — M79.601 PAIN OF RIGHT UPPER EXTREMITY: Primary | ICD-10-CM

## 2025-06-15 PROCEDURE — 99283 EMERGENCY DEPT VISIT LOW MDM: CPT | Performed by: STUDENT IN AN ORGANIZED HEALTH CARE EDUCATION/TRAINING PROGRAM

## 2025-06-15 PROCEDURE — 73090 X-RAY EXAM OF FOREARM: CPT

## 2025-06-15 RX ORDER — ACETAMINOPHEN 500 MG
1000 TABLET ORAL ONCE
Status: COMPLETED | OUTPATIENT
Start: 2025-06-15 | End: 2025-06-15

## 2025-06-15 RX ADMIN — ACETAMINOPHEN 1000 MG: 500 TABLET ORAL at 18:33

## 2025-06-15 NOTE — ED TRIAGE NOTES
Original injury occurred 2 weeks ago pt was attempting to jump over a ditch and didn't make it fell on to R side was seen at [facility] dx upper arm fracture pt is concerned about bruising and he thinks his arm is broken down lower    Pain is being controlled

## 2025-06-15 NOTE — ED PROVIDER NOTES
Subjective   History of Present Illness  Pt is a 65 y.o. male with PMH as listed who presents for   Chief Complaint   Patient presents with    Arm Injury       Patient is a 65-year-old male presents for right forearm pain.  He states that he tried to jump a ditch 2 weeks ago and it was wet on the other side, states that he fell and his feet went 1 way and his body went the other.  He was seen at [facility] where he was found to have a proximal humerus fracture and is following with orthopedics.  States over the past couple weeks he has persisted to have right forearm pain as well and is concerned for a possible break at this location as well.      Review of Systems    Past Medical History:   Diagnosis Date    Aggressive behavior of adult     physical and verbal; at times per documentation of [facility]    Aneurysm of basilar artery 06/03/2017    non-ruptured    Bipolar affective disorder in remission     Disease of thyroid gland     Failure to thrive in adult     GERD (gastroesophageal reflux disease)     Impaired cognition     Injury of back     Intracranial hemorrhage     non-traumatic    Kidney stones     Memory impairment     Recurrent major depressive disorder, in remission     Seizures     Stroke     Traumatic brain injury     Urine retention        No Known Allergies    Past Surgical History:   Procedure Laterality Date    BACK SURGERY      BRAIN SURGERY      COLONOSCOPY W/ POLYPECTOMY N/A 11/9/2022    Procedure: COLONOSCOPY FOR SCREENING;  Surgeon: Finesse Constantino MD;  Location: [facility] OR;  Service: Gastroenterology;  Laterality: N/A;  diverticulosis, polyps: sigmoid x3, rectal x1    EXTRACORPOREAL SHOCK WAVE LITHOTRIPSY (ESWL) Right 12/4/2023    Procedure: RIGHT SHOCKWAVE LITHOTRIPSY;  Surgeon: Unruly Wall MD;  Location: [facility] OR;  Service: Urology;  Laterality: Right;    KIDNEY STONE SURGERY      URETEROSCOPY LASER LITHOTRIPSY WITH STENT INSERTION N/A 04/28/2020      SHUNT INSERTION         Family History   Problem Relation Age of Onset    Aneurysm Mother     Mental illness Father     Mental illness Brother     Mental illness Son         schizophrenia    Malig Hyperthermia Neg Hx        Social History     Socioeconomic History    Marital status:    Tobacco Use    Smoking status: Every Day     Current packs/day: 1.00     Average packs/day: 1 pack/day for 45.0 years (45.0 ttl pk-yrs)     Types: Cigarettes    Smokeless tobacco: Never    Tobacco comments:     caffeine use 2 cups coffee daily & 1 soda daily   Vaping Use    Vaping status: Never Used   Substance and Sexual Activity    Alcohol use: No    Drug use: Never    Sexual activity: Defer           Objective   Physical Exam  Constitutional:       Appearance: Normal appearance.   HENT:      Head: Normocephalic and atraumatic.      Mouth/Throat:      Mouth: Mucous membranes are moist.      Pharynx: Oropharynx is clear.   Eyes:      Conjunctiva/sclera: Conjunctivae normal.   Cardiovascular:      Rate and Rhythm: Normal rate.   Pulmonary:      Effort: Pulmonary effort is normal.   Abdominal:      General: Abdomen is flat.   Musculoskeletal:      Cervical back: Neck supple.      Comments: Right shoulder in sling, forearm with moderate swelling, all compartments are soft however no crepitus is noted.  No erythema mild ecchymoses present.  Tender to mid forearm along radial aspect.  No tenderness over the elbow no tenderness over the wrist and range of motion to right wrist and fingers are intact with normal capillary refill less than 2 seconds and 2+ pulses right upper extremity.  Normal sensation right upper extremity.   Skin:     General: Skin is warm and dry.   Neurological:      Mental Status: He is alert.   Psychiatric:         Mood and Affect: Mood normal.         Procedures           ED Course  ED Course as of 06/15/25 7837   Sun Andrea 15, 2025   6316 Patient is a 65-year-old male presents for right forearm pain.  He  states that he tried to jump a ditch 2 weeks ago and it was wet on the other side, states that he fell and his feet went 1 way and his body went the other.  He was seen at [facility] where he was found to have a proximal humerus fracture and is following with orthopedics.  States over the past couple weeks he has persisted to have right forearm pain as well and is concerned for a possible break at this location as well.  Will obtain x-ray of forearm. [JF]   1755 X-ray negative for acute fracture.  Discussed patient plan for discharge with PCP and orthopedics follow-up, he understands and agrees.  All questions answered. [JF]      ED Course User Index  [JF] Gabe Hong MD                                                       Medical Decision Making  My differential diagnosis of the upper extremity pain or injury includes but is not limited to contusions of the shoulder, forearm, arm, wrist, elbow or hand, dislocations of shoulder, elbow, wrist, digits, nursemaid's elbow (age-appropriate), shoulder sprain, elbow sprain, wrist sprain, digit sprain, shoulder strain, arm strain, forearm strain, elbow strain, wrist strain, hand sprain, digit strain, lacerations of the upper extremity, fractures both closed and open of clavicle, scapula, humerus, radius, ulna, bones of the wrist, hands and digits, cellulitis or abscess, cervical radiculopathy, radial nerve palsy, neurogenic upper extremity pain, angina equivalent, SVT, DVT, arterial occlusion, compartment syndrome.      Problems Addressed:  Pain of right upper extremity: complicated acute illness or injury    Amount and/or Complexity of Data Reviewed  Radiology: ordered. Decision-making details documented in ED Course.        Final diagnoses:   Pain of right upper extremity       ED Disposition  ED Disposition       ED Disposition   Discharge    Condition   Stable    Comment   --               Nayla Grigsby, APRN  [address]  [address]  [phone]    Schedule an appointment as soon as possible for a visit in 2 days  For re-evaluation    Emiliana Gutierrez, APRN  [address] [facility] [address]  [facility] [id number] [phone]    Call in 2 days  Follow-up with your orthopedist or if you need a new orthopedist please call to establish care, For re-evaluation         Medication List      No changes were made to your prescriptions during this visit.            Gabe Hong MD  06/15/25 9749

## (undated) DEVICE — SPNG GZ WOVN 4X4IN 12PLY 10/BX STRL

## (undated) DEVICE — ADAPT CLN BIOGUARD AIR/H2O DISP

## (undated) DEVICE — BW-412T DISP COMBO CLEANING BRUSH: Brand: SINGLE USE COMBINATION CLEANING BRUSH

## (undated) DEVICE — SNAR POLYP CAPTIFLX MICRO OVL 13MM 240CM

## (undated) DEVICE — KT ORCA ORCAPOD DISP STRL

## (undated) DEVICE — SYR LL 3CC

## (undated) DEVICE — Device

## (undated) DEVICE — VIAL FORMALIN CAP 10P 40ML

## (undated) DEVICE — GLV SURG BIOGEL LTX PF 7 1/2

## (undated) DEVICE — FRCP BX RADJAW4 NDL 2.8 240CM LG OG BX40

## (undated) DEVICE — SAFELINER SUCTION CANISTER 1000CC: Brand: DEROYAL

## (undated) DEVICE — GOWN ,SIRUS,NONREINFORCED 3XL: Brand: MEDLINE